# Patient Record
Sex: FEMALE | Race: BLACK OR AFRICAN AMERICAN | NOT HISPANIC OR LATINO | Employment: FULL TIME | URBAN - METROPOLITAN AREA
[De-identification: names, ages, dates, MRNs, and addresses within clinical notes are randomized per-mention and may not be internally consistent; named-entity substitution may affect disease eponyms.]

---

## 2017-01-09 ENCOUNTER — OFFICE VISIT (OUTPATIENT)
Dept: FAMILY MEDICINE | Facility: CLINIC | Age: 57
End: 2017-01-09

## 2017-01-09 VITALS
SYSTOLIC BLOOD PRESSURE: 128 MMHG | TEMPERATURE: 97.5 F | HEART RATE: 76 BPM | WEIGHT: 169 LBS | HEIGHT: 67 IN | DIASTOLIC BLOOD PRESSURE: 83 MMHG | BODY MASS INDEX: 26.53 KG/M2 | OXYGEN SATURATION: 98 % | RESPIRATION RATE: 16 BRPM

## 2017-01-09 DIAGNOSIS — K21.9 GASTROESOPHAGEAL REFLUX DISEASE WITHOUT ESOPHAGITIS: ICD-10-CM

## 2017-01-09 DIAGNOSIS — E03.9 ACQUIRED HYPOTHYROIDISM: ICD-10-CM

## 2017-01-09 DIAGNOSIS — J45.30 MILD PERSISTENT ASTHMA WITHOUT COMPLICATION: ICD-10-CM

## 2017-01-09 DIAGNOSIS — E78.5 HYPERLIPIDEMIA LDL GOAL <130: ICD-10-CM

## 2017-01-09 DIAGNOSIS — Z11.59 NEED FOR HEPATITIS C SCREENING TEST: ICD-10-CM

## 2017-01-09 DIAGNOSIS — Z00.00 ROUTINE GENERAL MEDICAL EXAMINATION AT A HEALTH CARE FACILITY: Primary | ICD-10-CM

## 2017-01-09 DIAGNOSIS — S49.91XA SHOULDER INJURY, RIGHT, INITIAL ENCOUNTER: ICD-10-CM

## 2017-01-09 DIAGNOSIS — R09.81 NASAL CONGESTION: ICD-10-CM

## 2017-01-09 DIAGNOSIS — J31.0 CHRONIC RHINITIS: ICD-10-CM

## 2017-01-09 DIAGNOSIS — J45.31 MILD PERSISTENT ASTHMA WITH ACUTE EXACERBATION: ICD-10-CM

## 2017-01-09 DIAGNOSIS — E03.4 HYPOTHYROIDISM DUE TO ACQUIRED ATROPHY OF THYROID: ICD-10-CM

## 2017-01-09 LAB
ALBUMIN SERPL-MCNC: 3.3 G/DL (ref 3.4–5)
ALP SERPL-CCNC: 71 U/L (ref 40–150)
ALT SERPL W P-5'-P-CCNC: 19 U/L (ref 0–50)
ANION GAP SERPL CALCULATED.3IONS-SCNC: 5 MMOL/L (ref 3–14)
AST SERPL W P-5'-P-CCNC: 18 U/L (ref 0–45)
BASOPHILS # BLD AUTO: 0 10E9/L (ref 0–0.2)
BASOPHILS NFR BLD AUTO: 0.9 %
BILIRUB SERPL-MCNC: 0.2 MG/DL (ref 0.2–1.3)
BUN SERPL-MCNC: 9 MG/DL (ref 7–30)
CALCIUM SERPL-MCNC: 8.8 MG/DL (ref 8.5–10.1)
CHLORIDE SERPL-SCNC: 107 MMOL/L (ref 94–109)
CHOLEST SERPL-MCNC: 221 MG/DL
CO2 SERPL-SCNC: 28 MMOL/L (ref 20–32)
CREAT SERPL-MCNC: 0.75 MG/DL (ref 0.52–1.04)
DIFFERENTIAL METHOD BLD: NORMAL
EOSINOPHIL # BLD AUTO: 0.4 10E9/L (ref 0–0.7)
EOSINOPHIL NFR BLD AUTO: 8.7 %
ERYTHROCYTE [DISTWIDTH] IN BLOOD BY AUTOMATED COUNT: 13.3 % (ref 10–15)
GFR SERPL CREATININE-BSD FRML MDRD: 79 ML/MIN/1.7M2
GLUCOSE SERPL-MCNC: 100 MG/DL (ref 70–99)
HCT VFR BLD AUTO: 42.2 % (ref 35–47)
HCV AB SERPL QL IA: NORMAL
HDLC SERPL-MCNC: 57 MG/DL
HGB BLD-MCNC: 13.9 G/DL (ref 11.7–15.7)
IMM GRANULOCYTES # BLD: 0 10E9/L (ref 0–0.4)
IMM GRANULOCYTES NFR BLD: 0.2 %
LDLC SERPL CALC-MCNC: 142 MG/DL
LYMPHOCYTES # BLD AUTO: 2.2 10E9/L (ref 0.8–5.3)
LYMPHOCYTES NFR BLD AUTO: 47.8 %
MCH RBC QN AUTO: 28.5 PG (ref 26.5–33)
MCHC RBC AUTO-ENTMCNC: 32.9 G/DL (ref 31.5–36.5)
MCV RBC AUTO: 87 FL (ref 78–100)
MONOCYTES # BLD AUTO: 0.3 10E9/L (ref 0–1.3)
MONOCYTES NFR BLD AUTO: 6.6 %
NEUTROPHILS # BLD AUTO: 1.6 10E9/L (ref 1.6–8.3)
NEUTROPHILS NFR BLD AUTO: 35.8 %
NONHDLC SERPL-MCNC: 164 MG/DL
NRBC # BLD AUTO: 0 10*3/UL
NRBC BLD AUTO-RTO: 0 /100
PLATELET # BLD AUTO: 236 10E9/L (ref 150–450)
POTASSIUM SERPL-SCNC: 4 MMOL/L (ref 3.4–5.3)
PROT SERPL-MCNC: 7.6 G/DL (ref 6.8–8.8)
RBC # BLD AUTO: 4.87 10E12/L (ref 3.8–5.2)
SODIUM SERPL-SCNC: 140 MMOL/L (ref 133–144)
TRIGL SERPL-MCNC: 109 MG/DL
TSH SERPL DL<=0.005 MIU/L-ACNC: 1.78 MU/L (ref 0.4–4)
WBC # BLD AUTO: 4.6 10E9/L (ref 4–11)

## 2017-01-09 RX ORDER — CYCLOBENZAPRINE HCL 10 MG
10 TABLET ORAL
Qty: 14 TABLET | Refills: 0 | Status: SHIPPED | OUTPATIENT
Start: 2017-01-09 | End: 2017-11-16

## 2017-01-09 RX ORDER — LEVOTHYROXINE SODIUM 75 UG/1
75 TABLET ORAL DAILY
Qty: 90 TABLET | Refills: 3 | Status: SHIPPED | OUTPATIENT
Start: 2017-01-09 | End: 2018-01-24

## 2017-01-09 RX ORDER — ALBUTEROL SULFATE 90 UG/1
2 AEROSOL, METERED RESPIRATORY (INHALATION) EVERY 4 HOURS PRN
Qty: 1 INHALER | Refills: 2 | Status: SHIPPED | OUTPATIENT
Start: 2017-01-09 | End: 2018-01-24

## 2017-01-09 RX ORDER — FLUTICASONE PROPIONATE 50 MCG
2 SPRAY, SUSPENSION (ML) NASAL DAILY
Qty: 16 G | Refills: 11 | Status: SHIPPED | OUTPATIENT
Start: 2017-01-09 | End: 2018-01-24

## 2017-01-09 ASSESSMENT — PAIN SCALES - GENERAL: PAINLEVEL: SEVERE PAIN (6)

## 2017-01-09 NOTE — PATIENT INSTRUCTIONS
[facility] Medication Refill Request Information:  * Please contact your pharmacy regarding ANY request for medication refills.  ** [facility] Prescription Fax = [phone]  * Please allow 3 business days for routine medication refills.  * Please allow 5 business days for controlled substance medication refills.     [facility] Test Result notification information:  *You will be notified with in 7-10 days of your appointment day regarding the results of your test.  If you are on MyChart you will be notified as soon as the provider has reviewed the results and signed off on them.    [facility] [phone]
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
## 2017-01-09 NOTE — MR AVS SNAPSHOT
After Visit Summary   1/9/2017    Carolina Baugh    MRN: [id number]           Patient Information     Date Of Birth          1960        Visit Information        Provider Department      1/9/2017 1:50 PM Teto Mesa MD [facility]        Today's Diagnoses     Routine general medical examination at a health care facility    -  1     Mild persistent asthma without complication         Acquired hypothyroidism         Need for hepatitis C screening test         Hyperlipidemia LDL goal <130         Hypothyroidism due to acquired atrophy of thyroid         Gastroesophageal reflux disease without esophagitis         Nasal congestion         Chronic rhinitis         Mild persistent asthma with acute exacerbation         Shoulder injury, right, initial encounter           Care Instructions    [facility] Center Medication Refill Request Information:  * Please contact your pharmacy regarding ANY request for medication refills.  ** [facility] Prescription Fax = [phone]  * Please allow 3 business days for routine medication refills.  * Please allow 5 business days for controlled substance medication refills.     [facility] Test Result notification information:  *You will be notified with in 7-10 days of your appointment day regarding the results of your test.  If you are on MyChart you will be notified as soon as the provider has reviewed the results and signed off on them.    Primary Care Center [phone]           Follow-ups after your visit        Additional Services     PHYSICAL THERAPY REFERRAL       *This therapy referral will be filtered to a centralized scheduling office at [facility] and the patient will receive a call to schedule an appointment at a [facility] location most convenient for them. *     [facility] provides Physical Therapy evaluation and treatment and many specialty services across the [place] system.  If  requesting a specialty program, please choose from the list below.    If you have not heard from the scheduling office within 2 business days, please call [phone] for all locations, with the exception of [place], please call [phone].  Treatment: Evaluation & Treatment  Special Instructions/Modalities: per therapist  Special Programs: per therapist    Please be aware that coverage of these services is subject to the terms and limitations of your health insurance plan.  Call member services at your health plan with any benefit or coverage questions.      **Note to Provider:  If you are referring outside of [facility] for the therapy appointment, please list the name of the location in the  special instructions  above, print the referral and give to the patient to schedule the appointment.                  Follow-up notes from your care team     Write patient with results Return in about 1 year (around 1/9/2018).      Your next 10 appointments already scheduled     Jan 09, 2017  3:15 PM   LAB with  LAB   [facility] ([facility])    [address] [address] [phone]           Patient must bring picture ID.  Patient should be prepared to give a urine specimen  Please do not eat 10-12 hours before your appointment if you are coming in fasting for labs on lipids, cholesterol, or glucose (sugar).  Pregnant women should follow their Care Team instructions. Water with medications is okay. Do not drink coffee or other fluids.   If you have concerns about taking  your medications, please ask at office or if scheduling via [employer], send a message by clicking on Secure Messaging, Message Your Care Team.            Feb 10, 2017  2:00 PM   New Visit with Isidro Goddard MD   [facility]    [id number] [place] [address] [phone]              Future tests that were ordered for you today      Open Future Orders        Priority Expected Expires Ordered    Comprehensive metabolic panel Routine 2017    Lipid panel reflex to direct LDL Routine 2017    CBC with platelets differential Routine 2017    TSH with free T4 reflex Routine 2017    Hepatitis C Screen Reflex to HCV RNA Quant and Genotype Routine 2017            Who to contact     Please call your clinic at [phone] to:    Ask questions about your health    Make or cancel appointments    Discuss your medicines    Learn about your test results    Speak to your doctor   If you have compliments or concerns about an experience at your clinic, or if you wish to file a complaint, please contact [facility] Patient Relations at [phone] or email us at [email or web address]         Additional Information About Your Visit        [email or web address] Information     [email or web address] is an electronic gateway that provides easy, online access to your medical records. With [email or web address], you can request a clinic appointment, read your test results, renew a prescription or communicate with your care team.     To sign up for [email or web address] visit the website at [email or web address]   You will be asked to enter the access code listed below, as well as some personal information. Please follow the directions to create your username and password.     Your access code is: [email or web address]  Expires: 2017  5:30 AM     Your access code will  in 90 days. If you need help or a new code, please contact your [facility] or call [phone] for assistance.        Care EveryWhere ID     This is your Care EveryWhere ID. This could be used by other organizations to access your [facility] medical records  [id number]        Your Vitals Were     Pulse Temperature Respirations    76 97.5  F (36.4  C) (Oral) 16    Height BMI  "(Body Mass Index) Pulse Oximetry    1.689 m (5' 6.5\") 26.87 kg/m2 98%    Last Period Breastfeeding?       01/13/2012 No        Blood Pressure from Last 3 Encounters:   01/09/17 128/83   11/29/16 128/88   01/14/16 119/77    Weight from Last 3 Encounters:   01/09/17 76.658 kg (169 lb)   11/29/16 77.474 kg (170 lb 12.8 oz)   01/14/16 75.297 kg (166 lb)              We Performed the Following     PHYSICAL THERAPY REFERRAL          Today's Medication Changes          These changes are accurate as of: 1/9/17  3:00 PM.  If you have any questions, ask your nurse or doctor.               Start taking these medicines.        Dose/Directions    cyclobenzaprine 10 MG tablet   Commonly known as:  FLEXERIL   Used for:  Shoulder injury, right, initial encounter   Started by:  Teto Mesa MD        Dose:  10 mg   Take 1 tablet (10 mg) by mouth once as needed for muscle spasms (at bedtime)   Quantity:  14 tablet   Refills:  0            Where to get your medicines      These medications were sent to [facility] [place], [place] - [address]  [address] [address]     Phone:  [phone]    - albuterol 108 (90 BASE) MCG/ACT Inhaler  - cyclobenzaprine 10 MG tablet  - fluticasone 50 MCG/ACT spray  - fluticasone-salmeterol 250-50 MCG/DOSE diskus inhaler  - levothyroxine 75 MCG tablet  - ranitidine 300 MG tablet             Primary Care Provider Office Phone # Fax #    Teto Mesa [phone]        [facility] [address] [place] [address] [address]  [facility] [address]        Thank you!     Thank you for choosing [facility]  for your care. Our goal is always to provide you with excellent care. Hearing back from our patients is one way we can continue to improve our services. Please take a few minutes to complete the written survey that you may receive in the mail after your visit with us. Thank you!             Your Updated Medication List - Protect " others around you: Learn how to safely use, store and throw away your medicines at www.disposemymeds.org.          This list is accurate as of: 1/9/17  3:00 PM.  Always use your most recent med list.                   Brand Name Dispense Instructions for use    AEROCHAMBER MV Misc      Use as directed.  .       albuterol 108 (90 BASE) MCG/ACT Inhaler    PROAIR HFA/PROVENTIL HFA/VENTOLIN HFA    1 Inhaler    Inhale 2 puffs into the lungs every 4 hours as needed for shortness of breath / dyspnea       benzonatate 200 MG capsule    TESSALON    20 capsule    Take 1 capsule (200 mg) by mouth 3 times daily as needed for cough       CALCIUM-VITAMIN D PO      Take 1 tablet by mouth daily.       cyclobenzaprine 10 MG tablet    FLEXERIL    14 tablet    Take 1 tablet (10 mg) by mouth once as needed for muscle spasms (at bedtime)       fexofenadine 180 MG tablet    ALLEGRA     1 tablet daily. for allergy symptoms.       FISH OIL PO      Take 1 tablet by mouth daily as needed.       fluticasone 50 MCG/ACT spray    FLONASE    16 g    Spray 2 sprays into both nostrils daily       fluticasone-salmeterol 250-50 MCG/DOSE diskus inhaler    ADVAIR    1 Inhaler    Inhale 1 puff into the lungs every 12 hours       guaiFENesin-codeine 100-10 MG/5ML Soln solution    ROBITUSSIN AC    180 mL    Take 5-10 mLs by mouth every 4 hours as needed for cough       levothyroxine 75 MCG tablet    SYNTHROID/LEVOTHROID    90 tablet    Take 1 tablet (75 mcg) by mouth daily       MULTIVITAMIN PO      Take 1 tablet by mouth daily.       ranitidine 300 MG tablet    ZANTAC    90 tablet    Take 1 tablet (300 mg) by mouth At Bedtime       REFRESH OP      Apply to eye as needed       SALINE FLUSH IJ      by Nasal Instillation route.

## 2017-01-09 NOTE — NURSING NOTE
Chief Complaint   Patient presents with     Physical     Patient is here for annual physical; no pap     Geraldo Marina CMA at 2:02 PM on 1/9/2017

## 2017-01-10 ASSESSMENT — ASTHMA QUESTIONNAIRES: ACT_TOTALSCORE: 24

## 2017-01-13 ENCOUNTER — THERAPY VISIT (OUTPATIENT)
Dept: PHYSICAL THERAPY | Facility: CLINIC | Age: 57
End: 2017-01-13
Payer: COMMERCIAL

## 2017-01-13 DIAGNOSIS — M54.2 CERVICAL PAIN: ICD-10-CM

## 2017-01-13 DIAGNOSIS — S49.91XA INJURY OF RIGHT SHOULDER: Primary | ICD-10-CM

## 2017-01-13 PROCEDURE — 97112 NEUROMUSCULAR REEDUCATION: CPT | Mod: GP | Performed by: PHYSICAL THERAPIST

## 2017-01-13 PROCEDURE — 97161 PT EVAL LOW COMPLEX 20 MIN: CPT | Mod: GP | Performed by: PHYSICAL THERAPIST

## 2017-01-13 PROCEDURE — 97110 THERAPEUTIC EXERCISES: CPT | Mod: GP | Performed by: PHYSICAL THERAPIST

## 2017-01-13 NOTE — PROGRESS NOTES
[facility] Initial Evaluation      Subjective:    Carolina Baugh is a 57 year old female with a right shoulder condition.  Condition occurred with:  A fall (FIRST STRUCK ON HER BUTTOCKS. ).  Condition occurred: at work.  This is a new condition  EARLY December 2016..    Patient reports pain:  Posterior.  Radiates to:  Cervical, lower arm and upper arm.  Pain is described as cramping and is intermittent and reported as 8/10.  Associated symptoms:  Tingling and loss of motion/stiffness. Pain is worse in the P.M..  Symptoms are exacerbated by lying on extremity and using arm behind back and relieved by heat.  Since onset symptoms are gradually improving.  Special testing: NONE.  Previous treatment: NONE.    General health as reported by patient is good.  Pertinent medical history includes:  Thyroid problems and asthma.  Medical allergies: no.  Other surgeries include:  Other.  Current medications:  Pain medication.  Current occupation is .    Primary job tasks include:  Driving and prolonged sitting.    Barriers include:  None as reported by the patient.    Red flags:  None as reported by the patient.                      Objective:    System                   Shoulder Evaluation:  ROM:  AROM:    Flexion:  Left:  WNL    Right:  WNL ERP                      Extension/Internal Rotation:  Left:  T8    Right:  T8          Strength:        Abduction:  Left: 5/5  Pain:    Right:/5  Strong/painful    Pain:  Adduction:  Right:/5  Strong/painful    Pain:  Internal Rotation:  Left:5/5     Pain:    Right:/5  Strong/painful    Pain:  External Rotation:   Left:5/5     Pain:   Right:/5  Strong/painful    Pain:                Special Tests:      Left shoulder negative for the following special tests:  Impingement  Right shoulder positive for the following special tests:Impingement                                       Shar Cervical Evaluation      Movement Loss:  Protrusion (PRO): pain  Flexion  (Flex): pain  Retraction (RET): pain  Extension (EXT): pain  Lateral Flexion Right (LF R): pain  Lateral Flexion Left (LF L): pain  Rotation Right (ROT R): pain  Rotation Left (ROT L): pain  Test Movements:      RET: During: increases    Repeat RET: After: no better and no worse    RET EXT: During: increases    Repeat RET EXT: During: centralizing                          Conclusion: derangement  Principle of Treatment:  Posture Correction: IN SITTING WITH TOWEL ROLL    Extension: RETRACTION- RETRACTION EXTENSION.     Other: NEUTRAL SPINE, THER EX, THER ACT, NMR, MANUAL THERAPY.                                          ROS    Assessment/Plan:      Patient is a 57 year old female with cervical and right side shoulder complaints.    Patient has the following significant findings with corresponding treatment plan.                Diagnosis 1:  RIGHT CERVICAL / SHOULDER PAIN.   Pain -  hot/cold therapy, US, electric stimulation, mechanical traction, manual therapy, splint/taping/bracing/orthotics, self management, education, directional preference exercise and home program  Decreased ROM/flexibility - manual therapy, therapeutic exercise, therapeutic activity and home program  Decreased function - therapeutic activities and home program  Impaired posture - neuro re-education, therapeutic activities and home program    Therapy Evaluation Codes:   1) History comprised of:   Personal factors that impact the plan of care:      None.    Comorbidity factors that impact the plan of care are:      None.     Medications impacting care: None.  2) Examination of Body Systems comprised of:   Body structures and functions that impact the plan of care:      Cervical spine.   Activity limitations that impact the plan of care are:      Driving.  3) Clinical presentation characteristics are:   Stable/Uncomplicated.  4) Decision-Making    Low complexity using standardized patient assessment instrument and/or measureable assessment of  functional outcome.  Cumulative Therapy Evaluation is: Low complexity.    Previous and current functional limitations:  (See Goal Flow Sheet for this information)    Short term and Long term goals: (See Goal Flow Sheet for this information)     Communication ability:  Patient appears to be able to clearly communicate and understand verbal and written communication and follow directions correctly.  Treatment Explanation - The following has been discussed with the patient:   RX ordered/plan of care  Anticipated outcomes  Possible risks and side effects  This patient would benefit from PT intervention to resume normal activities.   Rehab potential is good.    Frequency:  1 X week, once daily  Duration:  for 1 weeks  Discharge Plan:  Achieve all LTG.  Independent in home treatment program.  Reach maximal therapeutic benefit.    Please refer to the daily flowsheet for treatment today, total treatment time and time spent performing 1:1 timed codes.

## 2017-01-16 ENCOUNTER — THERAPY VISIT (OUTPATIENT)
Dept: PHYSICAL THERAPY | Facility: CLINIC | Age: 57
End: 2017-01-16
Payer: COMMERCIAL

## 2017-01-16 DIAGNOSIS — M54.2 CERVICAL PAIN: ICD-10-CM

## 2017-01-16 DIAGNOSIS — S49.91XD INJURY OF RIGHT SHOULDER, SUBSEQUENT ENCOUNTER: Primary | ICD-10-CM

## 2017-01-16 PROCEDURE — 97035 APP MDLTY 1+ULTRASOUND EA 15: CPT | Mod: GP

## 2017-01-16 PROCEDURE — 97110 THERAPEUTIC EXERCISES: CPT | Mod: GP

## 2017-01-16 PROCEDURE — 97112 NEUROMUSCULAR REEDUCATION: CPT | Mod: GP

## 2017-01-16 PROCEDURE — 97140 MANUAL THERAPY 1/> REGIONS: CPT | Mod: GP

## 2017-01-16 PROCEDURE — 97530 THERAPEUTIC ACTIVITIES: CPT | Mod: GP

## 2017-01-31 ENCOUNTER — TELEPHONE (OUTPATIENT)
Dept: DERMATOLOGY | Facility: CLINIC | Age: 57
End: 2017-01-31

## 2017-01-31 NOTE — TELEPHONE ENCOUNTER
Left message for patient regarding upcoming appointment on 2/10/17 at 2:00pm.  Informed patient to bring an updated list of allergies, medications, pharmacy details and insurance information. Asked patient to bring any dermatology records from outside [place] or the [facility] or have them faxed to [phone].    Patient Reminders Given:  --Plan on being in our facility for approximately one hour, this includes the registration process, office visit, education and check-out process.  If you are having a procedure, more time may be required.     --If you are having a procedure, please, present 15 minutes early.  --We are located on the second floor of the building, check in desk 4.   --If you need to cancel or reschedule, call [phone].  --We look forward to seeing you in Dermatology Clinic.       Akilah Russell Medical Assistant

## 2017-02-09 ENCOUNTER — TELEPHONE (OUTPATIENT)
Dept: DERMATOLOGY | Facility: CLINIC | Age: 57
End: 2017-02-09

## 2017-02-09 NOTE — TELEPHONE ENCOUNTER
[facility] Call Center    Phone Message    Name of Caller: David    Phone Number: Other phone number:  [phone]*    Best time to return call: ANY    May a detailed message be left on voicemail: yes    Relation to patient: daughter of patient, patient doesn't speak english    Reason for Call: Other: Patient needs new patient forms, (Auth to discuss, Release of Info, faxed to her so she can fill out before her appointment tomorrow. fax is [phone]. please call back daughter on cell [phone] once complete or if there are any issues    Action Taken: Message routed to:  Adult Clinics: Dermatology p [id number]

## 2017-02-09 NOTE — TELEPHONE ENCOUNTER
Forms faxed to daughter.  Call placed to daughter to confirm that she did receive.      Carol Westholter

## 2017-02-10 ENCOUNTER — OFFICE VISIT (OUTPATIENT)
Dept: DERMATOLOGY | Facility: CLINIC | Age: 57
End: 2017-02-10
Payer: COMMERCIAL

## 2017-02-10 DIAGNOSIS — L81.0 POST-INFLAMMATORY HYPERPIGMENTATION: Primary | ICD-10-CM

## 2017-02-10 DIAGNOSIS — L82.1 DERMATOSIS PAPULOSA NIGRA: ICD-10-CM

## 2017-02-10 DIAGNOSIS — L81.1 MELASMA: ICD-10-CM

## 2017-02-10 PROCEDURE — 99201 ZZC OFFICE/OUTPT VISIT, NEW, LEVEL I: CPT | Performed by: DERMATOLOGY

## 2017-02-10 RX ORDER — HYDROQUINONE 40 MG/G
CREAM TOPICAL
Qty: 60 G | Refills: 3 | Status: SHIPPED | OUTPATIENT
Start: 2017-02-10 | End: 2018-10-30

## 2017-02-10 NOTE — NURSING NOTE
Dermatology Rooming Note    Carolina Baugh's goals for this visit include:   Chief Complaint   Patient presents with     Derm Problem     Dark colored spots on face       Is a scribe okay for this visit:YES    Are records needed for this visit(If yes, obtain release of information): no     Vitals: LMP 01/13/2012    Referring Provider:  Referred Self, MD  No address on file

## 2017-02-10 NOTE — MR AVS SNAPSHOT
After Visit Summary   2/10/2017    Carolina Baugh    MRN: [id number]           Patient Information     Date Of Birth          1960        Visit Information        Provider Department      2/10/2017 2:00 PM Isidro Goddard MD [facility]        Today's Diagnoses     Post-inflammatory hyperpigmentation    -  1     Melasma         Dermatosis papulosa nigra           Care Instructions    1) Bleaching Cream: Hydroquinone is the ingredient for the bleaching cream. If you use this cream twice a day for over 4 months consistently you will get actually pigment deposition. There is an over the counter version at 2%. AMBI cream is the over the counter version.  -[facility] pharmacy is an online pharmacy that will likely have this medication at a cheaper price.        2) Differin Gel 0.1%. This is over the counter. This will make you dry especially you use it every night. You need time for the skin to adjust to it. It is only used at night. This medicine is best found online on amazon.com. Apply a pea-sized amount to the face at night. Start every 3rd night and increase by 1 night ever week as tolerated.  STOP THIS MEDICINE 1 week before any chemical peel and restart 1 week after.      3) Chemical peels will slowly help peel the pigment away.      4) You will need to protect yourself from the sun. This is very important. SUn exposure will make everything work. USe a sunscreen.              Follow-ups after your visit        Your next 10 appointments already scheduled     Feb 15, 2017  2:00 PM   Nurse Only with NURSE ONLY MG DERM   [facility])    [address] [facility] [address] [phone]            May 11, 2017  2:45 PM   Return Visit with Isidro Goddard MD   [facility])    [address] [facility] [address] [phone]              Who to contact     If  you have questions or need follow up information about today's clinic visit or your schedule please contact [facility] directly at [phone].  Normal or non-critical lab and imaging results will be communicated to you by MyChart, letter or phone within 4 business days after the clinic has received the results. If you do not hear from us within 7 days, please contact the clinic through MyChart or phone. If you have a critical or abnormal lab result, we will notify you by phone as soon as possible.  Submit refill requests through [employer] or call your pharmacy and they will forward the refill request to us. Please allow 3 business days for your refill to be completed.          Additional Information About Your Visit        [employer]ia Information     [employer] is an electronic gateway that provides easy, online access to your medical records. With [employer]ognia, you can request a clinic appointment, read your test results, renew a prescription or communicate with your care team.     To sign up for [employer]ia visit the website at [email or web address]   You will be asked to enter the access code listed below, as well as some personal information. Please follow the directions to create your username and password.     Your access code is: [email or web address]  Expires: 2017  2:57 PM     Your access code will  in 90 days. If you need help or a new code, please contact your [facility] or call [phone] for assistance.        Care EveryWhere ID     This is your Care EveryWhere ID. This could be used by other organizations to access your [facility] medical records  [id number]        Your Vitals Were     Last Period                   2012            Blood Pressure from Last 3 Encounters:   17 128/83   16 128/88   16 119/77    Weight from Last 3 Encounters:   17 76.658 kg (169 lb)   16 77.474 kg (170 lb 12.8 oz)   16 75.297 kg (166 lb)               Today, you had the following     No orders found for display         Today's Medication Changes          These changes are accurate as of: 2/10/17  2:57 PM.  If you have any questions, ask your nurse or doctor.               Start taking these medicines.        Dose/Directions    hydroquinone 4 % Crea   Used for:  Melasma, Post-inflammatory hyperpigmentation   Started by:  Isidro Goddard MD        Apply twice a day to affected area of the face for 4 months max and then take a 1 month break.   Quantity:  60 g   Refills:  3            Where to get your medicines      These medications were sent to [facility] - [place], [place] - [address]  [address], [facility] [address]     Phone:  [phone]    - hydroquinone 4 % Crea             Primary Care Provider Office Phone # Fax #    Teto Mesa [phone]        [facility] [address] [facility] [address]  [facility] [address]        Thank you!     Thank you for choosing [facility]  for your care. Our goal is always to provide you with excellent care. Hearing back from our patients is one way we can continue to improve our services. Please take a few minutes to complete the written survey that you may receive in the mail after your visit with us. Thank you!             Your Updated Medication List - Protect others around you: Learn how to safely use, store and throw away your medicines at www.disposemymeds.org.          This list is accurate as of: 2/10/17  2:57 PM.  Always use your most recent med list.                   Brand Name Dispense Instructions for use    AEROCHAMBER MV Misc      Use as directed.  .       albuterol 108 (90 BASE) MCG/ACT Inhaler    PROAIR HFA/PROVENTIL HFA/VENTOLIN HFA    1 Inhaler    Inhale 2 puffs into the lungs every 4 hours as needed for shortness of breath / dyspnea       cyclobenzaprine 10 MG tablet    FLEXERIL    14 tablet    Take 1 tablet (10 mg) by  mouth once as needed for muscle spasms (at bedtime)       fexofenadine 180 MG tablet    ALLEGRA     1 tablet daily. for allergy symptoms.       FISH OIL PO      Take 1 tablet by mouth daily as needed.       fluticasone 50 MCG/ACT spray    FLONASE    16 g    Spray 2 sprays into both nostrils daily       fluticasone-salmeterol 250-50 MCG/DOSE diskus inhaler    ADVAIR    1 Inhaler    Inhale 1 puff into the lungs every 12 hours       hydroquinone 4 % Crea     60 g    Apply twice a day to affected area of the face for 4 months max and then take a 1 month break.       levothyroxine 75 MCG tablet    SYNTHROID/LEVOTHROID    90 tablet    Take 1 tablet (75 mcg) by mouth daily       MULTIVITAMIN PO      Take 1 tablet by mouth daily.       ranitidine 300 MG tablet    ZANTAC    90 tablet    Take 1 tablet (300 mg) by mouth At Bedtime       REFRESH OP      Apply to eye as needed

## 2017-02-10 NOTE — PATIENT INSTRUCTIONS
1) Bleaching Cream: Hydroquinone is the ingredient for the bleaching cream. If you use this cream twice a day for over 4 months consistently you will get actually pigment deposition. There is an over the counter version at 2%. AMBI cream is the over the counter version.  -[facility] pharmacy is an online pharmacy that will likely have this medication at a cheaper price.        2) Differin Gel 0.1%. This is over the counter. This will make you dry especially you use it every night. You need time for the skin to adjust to it. It is only used at night. This medicine is best found online on amazon.com. Apply a pea-sized amount to the face at night. Start every 3rd night and increase by 1 night ever week as tolerated.  STOP THIS MEDICINE 1 week before any chemical peel and restart 1 week after.      3) Chemical peels will slowly help peel the pigment away.      4) You will need to protect yourself from the sun. This is very important. SUn exposure will make everything work. USe a sunscreen.

## 2017-02-10 NOTE — PROGRESS NOTES
"[facility] Dermatology Note      Dermatology Problem List:  1. Melasma: Zygoma  - Strict Sun protection  - 4% Hydroquinone BID x 4 months then 1 month break  - Differin 0.1% gel qhs taper up  - Chemical peels  2. Post-inflammatory hyperpigmentation from Capsaicin: Right side of face.  - Strict Sun protection  - 4% Hydroquinone BID x 4 months then 1 month break  - Differin 0.1% gel qhs taper up  - Chemical peels  3. Dermatosis Papillomatosis nigra    Encounter Date: Feb 10, 2017    CC:  Chief Complaint   Patient presents with     Derm Problem     Dark colored spots on face         History of Present Illness:  Ms. Carolina Baugh is a 57 year old female who presents for evaluation of facial discoloration. She is here with her daughter She has a history of facial discoloration while she was pregnant and that discoloration is on her cheekbones. That has been relatively stable. She saw a dr regarding black bumps on her cheeks and they treated with cryotherapy on a few which left \"scars.\" On a night 2 months ago she was applying capsaicin cream to her 's back, washed her hands twice, and then slept with her right hand on her cheek as she usually does but woke up with blisters on the entire side of the cheek and lesser so on the left cheek. The blistering and irritations now have all resolved but it left a hyperpigmented stain on those locations.        Past Medical History:   Patient Active Problem List   Diagnosis     Mild persistent asthma     Encounter for screening colonoscopy     CARDIOVASCULAR SCREENING; LDL GOAL LESS THAN 160     Sinusitis, chronic     Post-menopausal     Hypothyroidism     Mantoux: positive     Advance care planning     Injury of right shoulder     Cervical pain     Past Medical History   Diagnosis Date     Asthma      Encounter for screening colonoscopy 11/11/11     Normal, next 10 years 2021     MGD (meibomian gland disease)      Dry eye syndrome      Hyperthyroidism  "     Past Surgical History   Procedure Laterality Date     Ent surgery       Biopsy breast Right        Social History:  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 The patient denies use of tanning beds.    Family History:  There is no family history of skin cancer.    Medications:  Current Outpatient Prescriptions   Medication Sig Dispense Refill     levothyroxine (SYNTHROID/LEVOTHROID) 75 MCG tablet Take 1 tablet (75 mcg) by mouth daily 90 tablet 3     ranitidine (ZANTAC) 300 MG tablet Take 1 tablet (300 mg) by mouth At Bedtime 90 tablet 3     fluticasone (FLONASE) 50 MCG/ACT spray Spray 2 sprays into both nostrils daily 16 g 11     fluticasone-salmeterol (ADVAIR) 250-50 MCG/DOSE diskus inhaler Inhale 1 puff into the lungs every 12 hours 1 Inhaler 12     albuterol (PROAIR HFA/PROVENTIL HFA/VENTOLIN HFA) 108 (90 BASE) MCG/ACT Inhaler Inhale 2 puffs into the lungs every 4 hours as needed for shortness of breath / dyspnea 1 Inhaler 2     cyclobenzaprine (FLEXERIL) 10 MG tablet Take 1 tablet (10 mg) by mouth once as needed for muscle spasms (at bedtime) 14 tablet 0     Polyvinyl Alcohol-Povidone (REFRESH OP) Apply to eye as needed       Omega-3 Fatty Acids (FISH OIL PO) Take 1 tablet by mouth daily as needed.       Spacer/Aero-Holding Chambers (AEROCHAMBER MV) MISC Use as directed.  .        fexofenadine (ALLEGRA) 180 MG tablet 1 tablet daily. for allergy symptoms.       MULTIVITAMIN OR Take 1 tablet by mouth daily.         No Known Allergies    Review of Systems:  -Skin/Heme New Pt: The patient denies frequent sun exposure. The patient denies excessive scarring or problems healing except as per HPI. The patient denies excessive bleeding.  -Constitutional: The patient denies fatigue, fevers, chills, unintended weight loss, and night sweats.  -HEENT: Patient denies nonhealing oral sores.      Physical exam:  Vitals: [facility] 01/13/2012  GEN: This is a well developed, well-nourished female in no acute distress, in a pleasant mood.     PSYCH: In pleasant mood, appropriate affect  SKIN: Focused examination of the face was performed.  -Skin type 5  -Right lateral face there is hyperpigmented patches at site of prior blistering.   -B/l zygoma with reticulated darker brown patches that avoids the orbit and eyelid.   -Within the entire area there are a few waxy scattered papules 1-2 mm in diameter  -Left neck towards the jaw is a hyperpigmented burn scar.  -No other lesions of concern on areas examined.       Impression/Plan:  The brown reticulated patches on zygoma represents melasma most likely. The right sided hyperpigmentation is post-inflammatory hyperpigmentation. Unfortunately, treatment is cosmetic and not covered by insurance, which was explained to the patient. Melasma and Post-inflammatory hyperpigmentation is treated the same exact way.    1. Post-inflammatory hyperpigmentation + Melasma    Strict Sun protection    4% Hydroquinone BID x 4 months then 1 month break    OTC version available at 2%.    Warned of Exogenous Ochronosis    Provided info for [facility] pharmacy online and procedure for use.    OTC Differin 0.1% gel qhs taper up (order online)    Chemical peels, at least 3 is likely needed, consent done today.     No h/o cold sores.    Clinical photos obtained.    2. Dermatosis Papillomatosis nigra    Benign nature was discussed. No further intervention required at this time.     CC Teto Mesa MD on close of this encounter.  Follow-up 1-2 month after chemical peels completed.      Staff Involved:  Scribe/Staff    Scribe Disclosure:   I, Tony Nichols, am serving as a scribe to document services personally performed by Dr. Isidro Goddard, based on data collection and the provider's statements to me.     Provider Disclosure:   I have reviewed the documentation recorded by the scribe and have edited it as needed. I have personally performed the services documented here and the documentation accurately represents those  services and the decisions made by me.     Isidro Goddard MD, MS    Department of Dermatology  [facility]: Phone: [phone], Fax:[phone]  [facility]: Phone: [phone], Fax: [phone]

## 2017-02-15 ENCOUNTER — ALLIED HEALTH/NURSE VISIT (OUTPATIENT)
Dept: NURSING | Facility: CLINIC | Age: 57
End: 2017-02-15

## 2017-02-15 DIAGNOSIS — L81.0 POST-INFLAMMATORY HYPERPIGMENTATION: Primary | ICD-10-CM

## 2017-02-15 PROCEDURE — 96999 UNLISTED SPEC DERM SVC/PX: CPT

## 2017-02-15 NOTE — NURSING NOTE
Nursing Peel Treatment Record:  Feb 15, 2017    Pre peel:    Any changes in health or medications: No    Strawberry or aspirin allergy(If yes, then no salicylic acid peels to be given and procedure to be held): N/A    Sulfa allergy(If yes, then [employer] sensitive skin peel procedure to be held): N/A    Pregnant or breastfeeding(If yes, peel procedure to be held): No    Baseline photo obtained(3 views): Yes    Areas treated today: face    Treatment #: 1.    Confirmed that retinoid was stopped 7 days prior to peel: Yes    Peel Type: Sensitive Skin Peel    Confirmed that patient has not had electrolysis, depilatory creams, waxing or laser hair removal in the last week: Yes    Peel record:    Eye shields were placed.     Area to be treated was cleansed with Gentle Cleanser using soft 4X4 gauze pads.    Area was then toned with with the Conditioning Solution using soft 4X4 gauze pads.    Then, the areas were degreased with acetone and retexturing Activator applied. Clarifying Misha Masque then applied to face and let set for 6 minutes.     Time-out was performed to evaluate for any sensitive skin.      Hydrabalm was then applied to the lips, perinasal region, around nose ring and near the outer canthi.     The peel was applied with soft 2X2 for 1 pass and left on for 2 minutes, then removed with water dampened 4X4 soft gauze.     Phytocorrective gel and epidermal repair topicals were applied and followed by Sunscreen (Sheer Physical UV defence SPF 50).       Post peel:    Patient reminded of need to wait to use medicated creams until the skin has healed. This occurs five to seven days later. Post peel care instructions provided including need for sun avoidance. Patient tolerated peel well, no complications noted.       Payment:  The patient paid for a package of three peels today.     Madina Lerner LPN

## 2017-02-15 NOTE — NURSING NOTE
Carolina Baugh comes into clinic today at the request of Dr. Goddard found for Sensitive Peel to face.      This service provided today was under the direct supervision of Dr. Goddard, who was available if needed.    Eduarda Alonzo LPN

## 2017-02-15 NOTE — MR AVS SNAPSHOT
After Visit Summary   2/15/2017    Carolina Baugh    MRN: [id number]           Patient Information     Date Of Birth          1960        Visit Information        Provider Department      2/15/2017 2:00 PM NURSE ONLY [facility]        Care Instructions    Skin Peel Post Care Instructions    I will experience redness, swelling, peeling, pain, and heat sensation which can last 5-10days and may persist for 2-3weeks. I may experience itching, or acne. Risks are permanent scarring, temporary or permanent skin lightening or darkening, infection, and eye injury. I understand my outcome could be no improvement or slight improvement. Multiple treatments may be required.     What can I expect?    Skin peeling for three to five days    Flaking    Skin darkening    Redness    How do I take care of my skin?    Patient compliance is mandatory for an optimal outcome and to avoid complications    Wear sunscreen (SPF 30 or greater) and a hat. Stay out of the sun for three to four weeks     Use a gentle skin care regimen with a sunscreen with at least an SPF of 30 or more. Examples include the following:   o SkinCeuticals Gentle Cleanser and SkinCeuticals Physical Matte UV DEFENSE SPF 50   o Cetaphil Soap followed by Cetaphil Sunscreen in the am and pm    Put on a bland moisturizer (Aquaphor or Vaseline) if sunscreen stings    Do not pick at peel or peel the skin. This will cause scarring.    Wait to use medicated creams until the skin has healed. This occurs five to seven days later    You can use make-up after 24 hours. Make sure make-up does NOT contain salicylic acid.     Eye make-up and lipstick are okay immediately after procedure    Do not use facial scrubs, depilatories, steam, any exfoliation procedures, etc. on your face (or treated area of skin) for one week post-peel    When should I call the doctor?    Cold sores    Swelling    Crusting    Who should I call with  questions?    [facility]: [phone]     [facility]: [phone]    For urgent needs outside of business hours call the [facility] at [phone] and ask for the dermatology resident on call            Follow-ups after your visit        Your next 10 appointments already scheduled     Mar 15, 2017  2:30 PM CDT   Nurse Only with NURSE ONLY MG DERM   [facility]    [address] [facility] [address] [phone]            May 11, 2017  2:45 PM CDT   Return Visit with Isidro Goddard MD   [facility])    [address] [address] [address] [phone]              Who to contact     If you have questions or need follow up information about today's clinic visit or your schedule please contact [facility] directly at [phone].  Normal or non-critical lab and imaging results will be communicated to you by MyChart, letter or phone within 4 business days after the clinic has received the results. If you do not hear from us within 7 days, please contact the clinic through MyChart or phone. If you have a critical or abnormal lab result, we will notify you by phone as soon as possible.  Submit refill requests through [employer] or call your pharmacy and they will forward the refill request to us. Please allow 3 business days for your refill to be completed.          Additional Information About Your Visit        [employer] Information     [employer] is an electronic gateway that provides easy, online access to your medical records. With [employer], you can request a clinic appointment, read your test results, renew a prescription or communicate with your care team.     To sign up for [employer] visit the website at [email or web address]   You will be asked to enter the access code listed below, as well as some  personal information. Please follow the directions to create your username and password.     Your access code is: [email or web address]  Expires: 2017  2:57 PM     Your access code will  in 90 days. If you need help or a new code, please contact your [facility] or call [phone] for assistance.        Care EveryWhere ID     This is your Care EveryWhere ID. This could be used by other organizations to access your [place] medical records  [id number]        Your Vitals Were     Last Period                   2012            Blood Pressure from Last 3 Encounters:   17 128/83   16 128/88   16 119/77    Weight from Last 3 Encounters:   17 76.7 kg (169 lb)   16 77.5 kg (170 lb 12.8 oz)   16 75.3 kg (166 lb)              Today, you had the following     No orders found for display       Primary Care Provider Office Phone # Fax #    [facility] [phone]        [facility] [address] [place] [address] 7[address]  [facility] [address]        Thank you!     Thank you for choosing [facility]  for your care. Our goal is always to provide you with excellent care. Hearing back from our patients is one way we can continue to improve our services. Please take a few minutes to complete the written survey that you may receive in the mail after your visit with us. Thank you!             Your Updated Medication List - Protect others around you: Learn how to safely use, store and throw away your medicines at www.disposemymeds.org.          This list is accurate as of: 2/15/17  2:59 PM.  Always use your most recent med list.                   Brand Name Dispense Instructions for use    AEROCHAMBER MV Misc      Use as directed.  .       albuterol 108 (90 BASE) MCG/ACT Inhaler    PROAIR HFA/PROVENTIL HFA/VENTOLIN HFA    1 Inhaler    Inhale 2 puffs into the lungs every 4 hours as needed for shortness of breath / dyspnea        cyclobenzaprine 10 MG tablet    FLEXERIL    14 tablet    Take 1 tablet (10 mg) by mouth once as needed for muscle spasms (at bedtime)       fexofenadine 180 MG tablet    ALLEGRA     1 tablet daily. for allergy symptoms.       FISH OIL PO      Take 1 tablet by mouth daily as needed.       fluticasone 50 MCG/ACT spray    FLONASE    16 g    Spray 2 sprays into both nostrils daily       fluticasone-salmeterol 250-50 MCG/DOSE diskus inhaler    ADVAIR    1 Inhaler    Inhale 1 puff into the lungs every 12 hours       hydroquinone 4 % Crea     60 g    Apply twice a day to affected area of the face for 4 months max and then take a 1 month break.       levothyroxine 75 MCG tablet    SYNTHROID/LEVOTHROID    90 tablet    Take 1 tablet (75 mcg) by mouth daily       MULTIVITAMIN PO      Take 1 tablet by mouth daily.       ranitidine 300 MG tablet    ZANTAC    90 tablet    Take 1 tablet (300 mg) by mouth At Bedtime       REFRESH OP      Apply to eye as needed

## 2017-02-15 NOTE — PATIENT INSTRUCTIONS
Skin Peel Post Care Instructions    I will experience redness, swelling, peeling, pain, and heat sensation which can last 5-10days and may persist for 2-3weeks. I may experience itching, or acne. Risks are permanent scarring, temporary or permanent skin lightening or darkening, infection, and eye injury. I understand my outcome could be no improvement or slight improvement. Multiple treatments may be required.     What can I expect?    Skin peeling for three to five days    Flaking    Skin darkening    Redness    How do I take care of my skin?    Patient compliance is mandatory for an optimal outcome and to avoid complications    Wear sunscreen (SPF 30 or greater) and a hat. Stay out of the sun for three to four weeks     Use a gentle skin care regimen with a sunscreen with at least an SPF of 30 or more. Examples include the following:   o SkinCeuticals Gentle Cleanser and SkinCeuticals Physical Matte UV DEFENSE SPF 50   o Cetaphil Soap followed by Cetaphil Sunscreen in the am and pm    Put on a bland moisturizer (Aquaphor or Vaseline) if sunscreen stings    Do not pick at peel or peel the skin. This will cause scarring.    Wait to use medicated creams until the skin has healed. This occurs five to seven days later    You can use make-up after 24 hours. Make sure make-up does NOT contain salicylic acid.     Eye make-up and lipstick are okay immediately after procedure    Do not use facial scrubs, depilatories, steam, any exfoliation procedures, etc. on your face (or treated area of skin) for one week post-peel    When should I call the doctor?    Cold sores    Swelling    Crusting    Who should I call with questions?    [facility]: [phone]     [facility]: [phone]    For urgent needs outside of business hours call the [facility] at [phone] and ask for the dermatology resident on call

## 2017-03-15 ENCOUNTER — ALLIED HEALTH/NURSE VISIT (OUTPATIENT)
Dept: NURSING | Facility: CLINIC | Age: 57
End: 2017-03-15

## 2017-03-15 DIAGNOSIS — L81.0 POSTINFLAMMATORY HYPERPIGMENTATION: Primary | ICD-10-CM

## 2017-03-15 PROCEDURE — 96999 UNLISTED SPEC DERM SVC/PX: CPT

## 2017-03-15 NOTE — NURSING NOTE
Nursing Peel Treatment Record:  Mar 15, 2017    Pre peel:    Any changes in health or medications: No    Strawberry or aspirin allergy(If yes, then no salicylic acid peels to be given and procedure to be held): No    Sulfa allergy(If yes, then SkinCeuticals sensitive skin peel procedure to be held): N/A    Pregnant or breastfeeding(If yes, peel procedure to be held): No    Baseline photo obtained(3 views): Yes    Areas treated today: face    Treatment #: 2 of 6.    Confirmed that retinoid was stopped 7 days prior to peel: Yes    Peel Type: Micropeel plus(20% salicylic acid with 3% glycolic acid)    Confirmed that patient has not had electrolysis, depilatory creams, waxing or laser hair removal in the last week: Yes    Peel record:    Eye shields were placed.     Area to be treated was cleansed with Simply Clean Cleanser using rough 4X4 gauze pads.    Area was then toned with with the Conditioning Solution using rough 4X4 gauze pads.    Then, the areas were degreased with acetone. Time-out was performed to evaluate for any sensitive skin.      Hydrabalm was then applied to the lips, perinasal region and near the outer canthi.     The peel was applied with infante swabs for 3 passes, then removed with water dampened 4X4 soft gauze.     Phytocorrective gel and epidermal repair topicals were applied and followed by Sunscreen (Sheer Physical UV defence SPF 50).         Post peel:    Patient reminded of need to wait to use medicated creams until the skin has healed. This occurs five to seven days later. Post peel care instructions provided including need for sun avoidance. Patient tolerated peel well, no complications noted.       Payment:  The patient paid for a package of 3 peels on 2-15-17. This is peel 2 of 3.     Madina Lerner LPN

## 2017-03-15 NOTE — PATIENT INSTRUCTIONS
Skin Peel Post Care Instructions    I will experience redness, swelling, peeling, pain, and heat sensation which can last 5-10days and may persist for 2-3weeks. I may experience itching, or acne. Risks are permanent scarring, temporary or permanent skin lightening or darkening, infection, and eye injury. I understand my outcome could be no improvement or slight improvement. Multiple treatments may be required.     What can I expect?    Skin peeling for three to five days    Flaking    Skin darkening    Redness    How do I take care of my skin?    Patient compliance is mandatory for an optimal outcome and to avoid complications    Wear sunscreen (SPF 30 or greater) and a hat. Stay out of the sun for three to four weeks     Use a gentle skin care regimen with a sunscreen with at least an SPF of 30 or more. Examples include the following:   o SkinCeuticals Gentle Cleanser and SkinCeuticals Physical Matte UV DEFENSE SPF 50   o Cetaphil Soap followed by Cetaphil Sunscreen in the am and pm    Put on a bland moisturizer (Aquaphor or Vaseline) if sunscreen stings    Do not pick at peel or peel the skin. This will cause scarring.    Wait to use medicated creams until the skin has healed. This occurs five to seven days later    You can use make-up after 24 hours. Make sure make-up does NOT contain salicylic acid.     Eye make-up and lipstick are okay immediately after procedure    Do not use facial scrubs, depilatories, steam, any exfoliation procedures, etc. on your face (or treated area of skin) for one week post-peel    When should I call the doctor?    Cold sores    Swelling    Crusting    Who should I call with questions?    [facility]: [phone]     [facility]: [phone]    For urgent needs outside of business hours call the [facility] at [phone] and ask for the dermatology resident on call

## 2017-03-15 NOTE — MR AVS SNAPSHOT
After Visit Summary   3/15/2017    Carolina Baugh    MRN: [id number]           Patient Information     Date Of Birth          1960        Visit Information        Provider Department      3/15/2017 2:30 PM NURSE ONLY [facility]        Care Instructions    Skin Peel Post Care Instructions    I will experience redness, swelling, peeling, pain, and heat sensation which can last 5-10days and may persist for 2-3weeks. I may experience itching, or acne. Risks are permanent scarring, temporary or permanent skin lightening or darkening, infection, and eye injury. I understand my outcome could be no improvement or slight improvement. Multiple treatments may be required.     What can I expect?    Skin peeling for three to five days    Flaking    Skin darkening    Redness    How do I take care of my skin?    Patient compliance is mandatory for an optimal outcome and to avoid complications    Wear sunscreen (SPF 30 or greater) and a hat. Stay out of the sun for three to four weeks     Use a gentle skin care regimen with a sunscreen with at least an SPF of 30 or more. Examples include the following:   o SkinCeuticals Gentle Cleanser and SkinCeuticals Physical Matte UV DEFENSE SPF 50   o Cetaphil Soap followed by Cetaphil Sunscreen in the am and pm    Put on a bland moisturizer (Aquaphor or Vaseline) if sunscreen stings    Do not pick at peel or peel the skin. This will cause scarring.    Wait to use medicated creams until the skin has healed. This occurs five to seven days later    You can use make-up after 24 hours. Make sure make-up does NOT contain salicylic acid.     Eye make-up and lipstick are okay immediately after procedure    Do not use facial scrubs, depilatories, steam, any exfoliation procedures, etc. on your face (or treated area of skin) for one week post-peel    When should I call the doctor?    Cold sores    Swelling    Crusting    Who should I call with  questions?    [facility]: [phone]     [facility]: [phone]    For urgent needs outside of business hours call the [facility] at [phone] and ask for the dermatology resident on call          Follow-ups after your visit        Your next 10 appointments already scheduled     Apr 12, 2017  3:00 PM CDT   Nurse Only with NURSE ONLY MG DERM   [facility]    [address] [place] [address] [phone]            May 11, 2017  2:45 PM CDT   Return Visit with Isidro Goddard MD   [facility])    [address] [address] [address] [phone]              Who to contact     If you have questions or need follow up information about today's clinic visit or your schedule please contact [facility] directly at [phone].  Normal or non-critical lab and imaging results will be communicated to you by MyChart, letter or phone within 4 business days after the clinic has received the results. If you do not hear from us within 7 days, please contact the clinic through MyChart or phone. If you have a critical or abnormal lab result, we will notify you by phone as soon as possible.  Submit refill requests through [employer] or call your pharmacy and they will forward the refill request to us. Please allow 3 business days for your refill to be completed.          Additional Information About Your Visit        [employer] Information     [employer] is an electronic gateway that provides easy, online access to your medical records. With [employer], you can request a clinic appointment, read your test results, renew a prescription or communicate with your care team.     To sign up for [employer] visit the website at [email or web address]   You will be asked to enter the access code listed below, as well as some personal  information. Please follow the directions to create your username and password.     Your access code is: [email or web address]  Expires: 2017  3:57 PM     Your access code will  in 90 days. If you need help or a new code, please contact your [facility] or call [phone] for assistance.        Care EveryWhere ID     This is your Care EveryWhere ID. This could be used by other organizations to access your [place] medical records  [id number]        Your Vitals Were     Last Period                   2012            Blood Pressure from Last 3 Encounters:   17 128/83   16 128/88   16 119/77    Weight from Last 3 Encounters:   17 76.7 kg (169 lb)   16 77.5 kg (170 lb 12.8 oz)   16 75.3 kg (166 lb)              Today, you had the following     No orders found for display       Primary Care Provider Office Phone # Fax #    [facility] [phone]        [facility] [address] [place] [address] [address]  [facility] [address]        Thank you!     Thank you for choosing [facility]  for your care. Our goal is always to provide you with excellent care. Hearing back from our patients is one way we can continue to improve our services. Please take a few minutes to complete the written survey that you may receive in the mail after your visit with us. Thank you!             Your Updated Medication List - Protect others around you: Learn how to safely use, store and throw away your medicines at www.disposemymeds.org.          This list is accurate as of: 3/15/17  3:02 PM.  Always use your most recent med list.                   Brand Name Dispense Instructions for use    AEROCHAMBER MV Misc      Use as directed.  .       albuterol 108 (90 BASE) MCG/ACT Inhaler    PROAIR HFA/PROVENTIL HFA/VENTOLIN HFA    1 Inhaler    Inhale 2 puffs into the lungs every 4 hours as needed for shortness of breath / dyspnea       cyclobenzaprine  10 MG tablet    FLEXERIL    14 tablet    Take 1 tablet (10 mg) by mouth once as needed for muscle spasms (at bedtime)       fexofenadine 180 MG tablet    ALLEGRA     1 tablet daily. for allergy symptoms.       FISH OIL PO      Take 1 tablet by mouth daily as needed.       fluticasone 50 MCG/ACT spray    FLONASE    16 g    Spray 2 sprays into both nostrils daily       fluticasone-salmeterol 250-50 MCG/DOSE diskus inhaler    ADVAIR    1 Inhaler    Inhale 1 puff into the lungs every 12 hours       hydroquinone 4 % Crea     60 g    Apply twice a day to affected area of the face for 4 months max and then take a 1 month break.       levothyroxine 75 MCG tablet    SYNTHROID/LEVOTHROID    90 tablet    Take 1 tablet (75 mcg) by mouth daily       MULTIVITAMIN PO      Take 1 tablet by mouth daily.       ranitidine 300 MG tablet    ZANTAC    90 tablet    Take 1 tablet (300 mg) by mouth At Bedtime       REFRESH OP      Apply to eye as needed

## 2017-03-15 NOTE — NURSING NOTE
Stephenridanni Baugh comes into clinic today at the request of Dr. Rupesh mendez.        This service provided today was under the direct supervision of Dr. Kaylee Luis, who was available if needed.    Eduarda Alonzo LPN

## 2017-03-30 ENCOUNTER — OFFICE VISIT (OUTPATIENT)
Dept: FAMILY MEDICINE | Facility: CLINIC | Age: 57
End: 2017-03-30
Payer: COMMERCIAL

## 2017-03-30 VITALS
DIASTOLIC BLOOD PRESSURE: 78 MMHG | SYSTOLIC BLOOD PRESSURE: 113 MMHG | OXYGEN SATURATION: 94 % | HEART RATE: 89 BPM | BODY MASS INDEX: 26.84 KG/M2 | WEIGHT: 168.8 LBS | TEMPERATURE: 97.2 F

## 2017-03-30 DIAGNOSIS — J45.901 ASTHMA EXACERBATION: ICD-10-CM

## 2017-03-30 DIAGNOSIS — J01.90 ACUTE SINUSITIS WITH SYMPTOMS > 10 DAYS: Primary | ICD-10-CM

## 2017-03-30 PROCEDURE — 99214 OFFICE O/P EST MOD 30 MIN: CPT | Performed by: INTERNAL MEDICINE

## 2017-03-30 RX ORDER — PREDNISONE 20 MG/1
40 TABLET ORAL DAILY
Qty: 10 TABLET | Refills: 0 | Status: SHIPPED | OUTPATIENT
Start: 2017-03-30 | End: 2017-04-04

## 2017-03-30 NOTE — NURSING NOTE
"Chief Complaint   Patient presents with     URI       Initial /78 (BP Location: Right arm, Patient Position: Chair, Cuff Size: Adult Regular)  Pulse 89  Temp 97.2  F (36.2  C) (Oral)  Wt 168 lb 12.8 oz (76.6 kg)  LMP 01/13/2012  SpO2 94%  BMI 26.84 kg/m2 Estimated body mass index is 26.84 kg/(m^2) as calculated from the following:    Height as of 1/9/17: 5' 6.5\" (1.689 m).    Weight as of this encounter: 168 lb 12.8 oz (76.6 kg).  Medication Reconciliation: complete    Ramila Chawla CMA  "

## 2017-03-30 NOTE — PROGRESS NOTES
"  SUBJECTIVE:                                                    Carolina Baugh is a 57 year old female who presents to clinic today for the following health issues:      ENT Symptoms             Symptoms: cc Present Absent Comment   Fever/Chills  x  Chills the first day   Fatigue  x     Muscle Aches  x     Eye Irritation   x    Sneezing  x     Nasal Len/Drg  x  Nasal discharge is \"chunky\" and brown throughout the day   Sinus Pressure/Pain   x    Loss of smell   x    Dental pain   x    Sore Throat   x    Swollen Glands   x    Ear Pain/Fullness  x  Both ears itchy   Cough  x  Non-productive; may have nasal backdrip   Wheeze  x     Chest Pain   x    Shortness of breath  x     Rash   x    Other  x  headache     Symptom duration:  6 days   Symptom severity:  severe   Treatments tried: Robitussin. Ibuprofen, Inhaler; is using using a nasal saline rinse   Contacts:  daughter sick     Has a history of asthma, uses Advair bid; does not use albuterol, usually.            Problem list and histories reviewed & adjusted, as indicated.  Additional history: as documented    Patient Active Problem List   Diagnosis     Mild persistent asthma     Encounter for screening colonoscopy     CARDIOVASCULAR SCREENING; LDL GOAL LESS THAN 160     Sinusitis, chronic     Post-menopausal     Hypothyroidism     Mantoux: positive     Advance care planning     Injury of right shoulder     Cervical pain     Past Surgical History:   Procedure Laterality Date     BIOPSY BREAST Right      ENT SURGERY         Social History   Substance Use Topics     Smoking status: Never Smoker     Smokeless tobacco: Never Used      Comment: exposure to second hand smoke     Alcohol use No     Family History   Problem Relation Age of Onset     Asthma Mother      Thyroid Disease Sister      Respiratory Sister      sinus, 2 sisters     CANCER No family hx of      DIABETES No family hx of      Hypertension No family hx of      CEREBROVASCULAR DISEASE No family hx of  "     Glaucoma No family hx of      Macular Degeneration No family hx of          Current Outpatient Prescriptions   Medication Sig Dispense Refill     amoxicillin-clavulanate (AUGMENTIN) 875-125 MG per tablet Take 1 tablet by mouth 2 times daily 20 tablet 0     predniSONE (DELTASONE) 20 MG tablet Take 2 tablets (40 mg) by mouth daily for 5 days 10 tablet 0     hydroquinone 4 % CREA Apply twice a day to affected area of the face for 4 months max and then take a 1 month break. 60 g 3     levothyroxine (SYNTHROID/LEVOTHROID) 75 MCG tablet Take 1 tablet (75 mcg) by mouth daily 90 tablet 3     ranitidine (ZANTAC) 300 MG tablet Take 1 tablet (300 mg) by mouth At Bedtime 90 tablet 3     fluticasone (FLONASE) 50 MCG/ACT spray Spray 2 sprays into both nostrils daily 16 g 11     fluticasone-salmeterol (ADVAIR) 250-50 MCG/DOSE diskus inhaler Inhale 1 puff into the lungs every 12 hours 1 Inhaler 12     albuterol (PROAIR HFA/PROVENTIL HFA/VENTOLIN HFA) 108 (90 BASE) MCG/ACT Inhaler Inhale 2 puffs into the lungs every 4 hours as needed for shortness of breath / dyspnea 1 Inhaler 2     cyclobenzaprine (FLEXERIL) 10 MG tablet Take 1 tablet (10 mg) by mouth once as needed for muscle spasms (at bedtime) 14 tablet 0     Polyvinyl Alcohol-Povidone (REFRESH OP) Apply to eye as needed       Omega-3 Fatty Acids (FISH OIL PO) Take 1 tablet by mouth daily as needed.       fexofenadine (ALLEGRA) 180 MG tablet 1 tablet daily. for allergy symptoms.       MULTIVITAMIN OR Take 1 tablet by mouth daily.         Reviewed and updated as needed this visit by clinical staff  Tobacco  Allergies  Meds  Med Hx  Surg Hx  Fam Hx  Soc Hx      Reviewed and updated as needed this visit by Provider         ==============================================================  ROS:  Constitutional, HEENT, cardiovascular, pulmonary, GI, , musculoskeletal, neuro, skin, endocrine and psych systems are negative, except as otherwise noted.       OBJECTIVE:                                                     /78 (BP Location: Right arm, Patient Position: Chair, Cuff Size: Adult Regular)  Pulse 89  Temp 97.2  F (36.2  C) (Oral)  Wt 168 lb 12.8 oz (76.6 kg)  LMP 01/13/2012  SpO2 94%  BMI 26.84 kg/m2  Body mass index is 26.84 kg/(m^2).     GENERAL APPEARANCE: healthy, alert and in no distress  EYES: Eyes grossly normal to inspection, and conjunctivae and sclerae normal  HENT: ear canals and TM's normal, nose and mouth without ulcers or lesions, oropharynx clear and oral mucous membranes moist  NECK: no adenopathy, no asymmetry, masses, or scars   RESP:   diffuse wheezes on exam which improved but did not resolve with forced coughing.   CV: regular rate and rhythm, normal S1 S2, no S3 or S4, no murmur, click or rub, no peripheral edema and peripheral pulses strong  ABDOMEN: soft, nontender, no hepatosplenomegaly, no masses and bowel sounds normal  MS: no musculoskeletal defects are noted and gait is age appropriate without ataxia  SKIN: no suspicious lesions or rashes  NEURO: mentation intact and speech normal  PSYCH: mentation appears normal and affect normal/bright.      ASSESSMENT/PLAN:                                                        ICD-10-CM    1. Acute sinusitis with symptoms > 10 days J01.90 amoxicillin-clavulanate (AUGMENTIN) 875-125 MG per tablet   2. Asthma exacerbation J45.901 predniSONE (DELTASONE) 20 MG tablet     (J01.90) Acute sinusitis with symptoms > 10 days  (primary encounter diagnosis)  Comment: most likely viral-advised to start abx if not better after 10 days, sooner if worse  Plan: amoxicillin-clavulanate (AUGMENTIN) 875-125 MG         per tablet  As per orders above and patient instructions below.           (J45.901) Asthma exacerbation  Comment: as noted on exam, likely triggered by her current viral URTI  Plan: predniSONE (DELTASONE) 20 MG tablet        As per orders above and patient instructions below.      Patient Instructions   For  your asthma symptoms:  Please take the 5 days course or prednisone.  Continue your Advair.  Continue to use the albuterol inhaler as needed.  See below.    For your sinus symptoms:  Treat this as you would treat a regular cold. If your symptoms are not better after a total of 10 days, start to take the antibiotic.  If your symptoms become worse in the next few days, start to take the antibiotic. Otherwise, do not take the antibiotic.          Asthma (Adult)  Asthma is a disease where the medium and  small air passages within the lung go into spasm and restrict the flow of air. Inflammation and swelling of the airways cause further restriction. During an acute asthma attack, these factors cause difficulty breathing, wheezing, cough and chest tightness.    An asthma attack can be triggered by many things. Common triggers include infections such as the common cold, bronchitis, pneumonia. Irritants such as smoke or pollutants in the air, emotional upset, and exercise can also trigger an attack. In many adults with asthma, allergies to dust, mold, pollen and animal dander can cause an asthma attack. Skipping doses of daily asthma medicine can also bring on an asthma attack.  Asthma can be controlled using the proper medicines prescribed by your healthcare provider and avoiding exposure to known triggers including allergens and irritants.  Home care    Take prescribed medicine exactly at the times advised. If you need medicine such as from a hand held inhaler or aerosol breathing machine more than every 4 hours, contact your healthcare provider or seek immediate medical attention. If prescribed an antibiotic or prednisone, take all of the medicine as prescribed, even if you are feeling better after a few days.    Do not smoke. Avoid being exposed to the smoke of others.    Some people with asthma have worsening of their symptoms when they take aspirin and non-steroidal or fever-reducing medicines like ibuprofen and  "naproxen. Talk to your healthcare provider if you think this may apply to you.  Follow-up care  Follow up with your healthcare provider, or as advised. Always bring all of your current medicines to any appointments with your healthcare provider. Also bring a complete list of medications even those not taken for asthma. If you do not already have one, talk to your healthcare provider about developing a personalized \"Asthma Action Plan.\"  A pneumococcal (pneumonia) vaccine and yearly flu shot (every fall) are recommended. Ask your doctor about this.  When to seek medical advice  Call your healthcare provider right away if any of these occur:     Increased wheezing or shortness of breath    Need to use your inhalers more often than usual without relief    Fever of 100.4 F (38 C) or higher, or as directed by your healthcare provider    Coughing up lots of dark-colored or bloody sputum (mucus)    Chest pain with each breath    If you use a peak flow meter as part of an Asthma Action Plan, and you are still in the yellow zone (50% to 80%) 15 minutes after using inhaler medicine.  Call 911  Call 911 if any of the following occur    Trouble walking or talking because of shortness of breath    If you use a peak flow meter as part of an Asthma Action Plan and you are still in the red zone (less than 50%) 15 minutes after using inhaler medicine    Lips or fingernails turning gray or blue    6849-4568 [employer]. [address] [place] [address]. All rights reserved. This information is not intended as a substitute for professional medical care. Always follow your healthcare professional's instructions.                        Lesley Tellez MD  [facility]  "

## 2017-03-30 NOTE — MR AVS SNAPSHOT
After Visit Summary   3/30/2017    Carolina Baugh    MRN: [id number]           Patient Information     Date Of Birth          1960        Visit Information        Provider Department      3/30/2017 3:30 PM Lesley Tellez MD [facility]        Today's Diagnoses     Acute sinusitis with symptoms > 10 days    -  1    Asthma exacerbation          Care Instructions    For your asthma symptoms:  Please take the 5 days course or prednisone.  Continue your Advair.  Continue to use the albuterol inhaler as needed.  See below.    For your sinus symptoms:  Treat this as you would treat a regular cold. If your symptoms are not better after a total of 10 days, start to take the antibiotic.  If your symptoms become worse in the next few days, start to take the antibiotic. Otherwise, do not take the antibiotic.          Asthma (Adult)  Asthma is a disease where the medium and  small air passages within the lung go into spasm and restrict the flow of air. Inflammation and swelling of the airways cause further restriction. During an acute asthma attack, these factors cause difficulty breathing, wheezing, cough and chest tightness.    An asthma attack can be triggered by many things. Common triggers include infections such as the common cold, bronchitis, pneumonia. Irritants such as smoke or pollutants in the air, emotional upset, and exercise can also trigger an attack. In many adults with asthma, allergies to dust, mold, pollen and animal dander can cause an asthma attack. Skipping doses of daily asthma medicine can also bring on an asthma attack.  Asthma can be controlled using the proper medicines prescribed by your healthcare provider and avoiding exposure to known triggers including allergens and irritants.  Home care    Take prescribed medicine exactly at the times advised. If you need medicine such as from a hand held inhaler or aerosol breathing machine more than every 4 hours,  "contact your healthcare provider or seek immediate medical attention. If prescribed an antibiotic or prednisone, take all of the medicine as prescribed, even if you are feeling better after a few days.    Do not smoke. Avoid being exposed to the smoke of others.    Some people with asthma have worsening of their symptoms when they take aspirin and non-steroidal or fever-reducing medicines like ibuprofen and naproxen. Talk to your healthcare provider if you think this may apply to you.  Follow-up care  Follow up with your healthcare provider, or as advised. Always bring all of your current medicines to any appointments with your healthcare provider. Also bring a complete list of medications even those not taken for asthma. If you do not already have one, talk to your healthcare provider about developing a personalized \"Asthma Action Plan.\"  A pneumococcal (pneumonia) vaccine and yearly flu shot (every fall) are recommended. Ask your doctor about this.  When to seek medical advice  Call your healthcare provider right away if any of these occur:     Increased wheezing or shortness of breath    Need to use your inhalers more often than usual without relief    Fever of 100.4 F (38 C) or higher, or as directed by your healthcare provider    Coughing up lots of dark-colored or bloody sputum (mucus)    Chest pain with each breath    If you use a peak flow meter as part of an Asthma Action Plan, and you are still in the yellow zone (50% to 80%) 15 minutes after using inhaler medicine.  Call 911  Call 911 if any of the following occur    Trouble walking or talking because of shortness of breath    If you use a peak flow meter as part of an Asthma Action Plan and you are still in the red zone (less than 50%) 15 minutes after using inhaler medicine    Lips or fingernails turning gray or blue    0523-5589 [employer]. [address] [place] [address], [place] [address]. All rights reserved. This information is not intended " "as a substitute for professional medical care. Always follow your healthcare professional's instructions.              Follow-ups after your visit        Your next 10 appointments already scheduled     Apr 12, 2017  3:00 PM CDT   Nurse Only with NURSE ONLY MG HENDERSON   [facility])    [address] [address] [place] [id number] [phone]            May 11, 2017  2:45 PM CDT   Return Visit with Isidro Goddard MD   [facility])    [address] [address] [place] [id number] [phone]              Who to contact     If you have questions or need follow up information about today's clinic visit or your schedule please contact [facility] directly at [phone].  Normal or non-critical lab and imaging results will be communicated to you by MyChart, letter or phone within 4 business days after the clinic has received the results. If you do not hear from us within 7 days, please contact the clinic through [employer] or phone. If you have a critical or abnormal lab result, we will notify you by phone as soon as possible.  Submit refill requests through [employer] or call your pharmacy and they will forward the refill request to us. Please allow 3 business days for your refill to be completed.          Additional Information About Your Visit        MyChart Information     [employer] lets you send messages to your doctor, view your test results, renew your prescriptions, schedule appointments and more. To sign up, go to [email or web address] . Click on \"Log in\" on the left side of the screen, which will take you to the Welcome page. Then click on \"Sign up Now\" on the right side of the page.     You will be asked to enter the access code listed below, as well as some personal information. Please follow the directions to create your username and password.     Your access code is: [email or web address]  Expires: 5/11/2017 "  3:57 PM     Your access code will  in 90 days. If you need help or a new code, please call your [facility] clinic or [phone].        Care EveryWhere ID     This is your Care EveryWhere ID. This could be used by other organizations to access your [facility] medical records  [id number]        Your Vitals Were     Pulse Temperature Last Period Pulse Oximetry BMI (Body Mass Index)       89 97.2  F (36.2  C) (Oral) 2012 94% 26.84 kg/m2        Blood Pressure from Last 3 Encounters:   17 113/78   17 128/83   16 128/88    Weight from Last 3 Encounters:   17 168 lb 12.8 oz (76.6 kg)   17 169 lb (76.7 kg)   16 170 lb 12.8 oz (77.5 kg)              We Performed the Following     Asthma Action Plan (AAP)          Today's Medication Changes          These changes are accurate as of: 3/30/17  4:24 PM.  If you have any questions, ask your nurse or doctor.               Start taking these medicines.        Dose/Directions    amoxicillin-clavulanate 875-125 MG per tablet   Commonly known as:  AUGMENTIN   Used for:  Acute sinusitis with symptoms > 10 days   Started by:  eLsley Tellez MD        Dose:  1 tablet   Take 1 tablet by mouth 2 times daily   Quantity:  20 tablet   Refills:  0       predniSONE 20 MG tablet   Commonly known as:  DELTASONE   Used for:  Asthma exacerbation   Started by:  Lesley Tellez MD        Dose:  40 mg   Take 2 tablets (40 mg) by mouth daily for 5 days   Quantity:  10 tablet   Refills:  0            Where to get your medicines      These medications were sent to [facility] [place] - [address]  [address], [facility] [address]     Phone:  [phone]     amoxicillin-clavulanate 875-125 MG per tablet    predniSONE 20 MG tablet                Primary Care Provider Office Phone # Fax #    [facility] [phone]        [facility] [address] [place] [address]  [address]  [facility] [address]        Thank you!     Thank you for choosing [facility]  for your care. Our goal is always to provide you with excellent care. Hearing back from our patients is one way we can continue to improve our services. Please take a few minutes to complete the written survey that you may receive in the mail after your visit with us. Thank you!             Your Updated Medication List - Protect others around you: Learn how to safely use, store and throw away your medicines at www.disposemymeds.org.          This list is accurate as of: 3/30/17  4:24 PM.  Always use your most recent med list.                   Brand Name Dispense Instructions for use    albuterol 108 (90 BASE) MCG/ACT Inhaler    PROAIR HFA/PROVENTIL HFA/VENTOLIN HFA    1 Inhaler    Inhale 2 puffs into the lungs every 4 hours as needed for shortness of breath / dyspnea       amoxicillin-clavulanate 875-125 MG per tablet    AUGMENTIN    20 tablet    Take 1 tablet by mouth 2 times daily       cyclobenzaprine 10 MG tablet    FLEXERIL    14 tablet    Take 1 tablet (10 mg) by mouth once as needed for muscle spasms (at bedtime)       fexofenadine 180 MG tablet    ALLEGRA     1 tablet daily. for allergy symptoms.       FISH OIL PO      Take 1 tablet by mouth daily as needed.       fluticasone 50 MCG/ACT spray    FLONASE    16 g    Spray 2 sprays into both nostrils daily       fluticasone-salmeterol 250-50 MCG/DOSE diskus inhaler    ADVAIR    1 Inhaler    Inhale 1 puff into the lungs every 12 hours       hydroquinone 4 % Crea     60 g    Apply twice a day to affected area of the face for 4 months max and then take a 1 month break.       levothyroxine 75 MCG tablet    SYNTHROID/LEVOTHROID    90 tablet    Take 1 tablet (75 mcg) by mouth daily       MULTIVITAMIN PO      Take 1 tablet by mouth daily.       predniSONE 20 MG tablet    DELTASONE    10 tablet    Take 2 tablets (40 mg) by mouth daily for 5 days       ranitidine 300 MG  tablet    ZANTAC    90 tablet    Take 1 tablet (300 mg) by mouth At Bedtime       REFRESH OP      Apply to eye as needed

## 2017-03-30 NOTE — PATIENT INSTRUCTIONS
For your asthma symptoms:  Please take the 5 days course or prednisone.  Continue your Advair.  Continue to use the albuterol inhaler as needed.  See below.    For your sinus symptoms:  Treat this as you would treat a regular cold. If your symptoms are not better after a total of 10 days, start to take the antibiotic.  If your symptoms become worse in the next few days, start to take the antibiotic. Otherwise, do not take the antibiotic.          Asthma (Adult)  Asthma is a disease where the medium and  small air passages within the lung go into spasm and restrict the flow of air. Inflammation and swelling of the airways cause further restriction. During an acute asthma attack, these factors cause difficulty breathing, wheezing, cough and chest tightness.    An asthma attack can be triggered by many things. Common triggers include infections such as the common cold, bronchitis, pneumonia. Irritants such as smoke or pollutants in the air, emotional upset, and exercise can also trigger an attack. In many adults with asthma, allergies to dust, mold, pollen and animal dander can cause an asthma attack. Skipping doses of daily asthma medicine can also bring on an asthma attack.  Asthma can be controlled using the proper medicines prescribed by your healthcare provider and avoiding exposure to known triggers including allergens and irritants.  Home care    Take prescribed medicine exactly at the times advised. If you need medicine such as from a hand held inhaler or aerosol breathing machine more than every 4 hours, contact your healthcare provider or seek immediate medical attention. If prescribed an antibiotic or prednisone, take all of the medicine as prescribed, even if you are feeling better after a few days.    Do not smoke. Avoid being exposed to the smoke of others.    Some people with asthma have worsening of their symptoms when they take aspirin and non-steroidal or fever-reducing medicines like ibuprofen and  "naproxen. Talk to your healthcare provider if you think this may apply to you.  Follow-up care  Follow up with your healthcare provider, or as advised. Always bring all of your current medicines to any appointments with your healthcare provider. Also bring a complete list of medications even those not taken for asthma. If you do not already have one, talk to your healthcare provider about developing a personalized \"Asthma Action Plan.\"  A pneumococcal (pneumonia) vaccine and yearly flu shot (every fall) are recommended. Ask your doctor about this.  When to seek medical advice  Call your healthcare provider right away if any of these occur:     Increased wheezing or shortness of breath    Need to use your inhalers more often than usual without relief    Fever of 100.4 F (38 C) or higher, or as directed by your healthcare provider    Coughing up lots of dark-colored or bloody sputum (mucus)    Chest pain with each breath    If you use a peak flow meter as part of an Asthma Action Plan, and you are still in the yellow zone (50% to 80%) 15 minutes after using inhaler medicine.  Call 911  Call 911 if any of the following occur    Trouble walking or talking because of shortness of breath    If you use a peak flow meter as part of an Asthma Action Plan and you are still in the red zone (less than 50%) 15 minutes after using inhaler medicine    Lips or fingernails turning gray or blue    7777-7067 [employer]. [address] [place] [address], [place] [address]. All rights reserved. This information is not intended as a substitute for professional medical care. Always follow your healthcare professional's instructions.        "

## 2017-04-12 ENCOUNTER — ALLIED HEALTH/NURSE VISIT (OUTPATIENT)
Dept: NURSING | Facility: CLINIC | Age: 57
End: 2017-04-12

## 2017-04-12 DIAGNOSIS — L81.1 MELASMA: ICD-10-CM

## 2017-04-12 DIAGNOSIS — L81.0 POST-INFLAMMATORY HYPERPIGMENTATION: Primary | ICD-10-CM

## 2017-04-12 PROCEDURE — 96999 UNLISTED SPEC DERM SVC/PX: CPT

## 2017-04-12 NOTE — PATIENT INSTRUCTIONS
Skin Peel Post Care Instructions    I will experience redness, swelling, peeling, pain, and heat sensation which can last 5-10days and may persist for 2-3weeks. I may experience itching, or acne. Risks are permanent scarring, temporary or permanent skin lightening or darkening, infection, and eye injury. I understand my outcome could be no improvement or slight improvement. Multiple treatments may be required.     What can I expect?    Skin peeling for three to five days    Flaking    Skin darkening    Redness    How do I take care of my skin?    Patient compliance is mandatory for an optimal outcome and to avoid complications    Wear sunscreen (SPF 30 or greater) and a hat. Stay out of the sun for three to four weeks     Use a gentle skin care regimen with a sunscreen with at least an SPF of 30 or more. Examples include the following:   o SkinCeuticals Gentle Cleanser and SkinCeuticals Physical Matte UV DEFENSE SPF 50   o Cetaphil Soap followed by Cetaphil Sunscreen in the am and pm    Put on a bland moisturizer (Aquaphor or Vaseline) if sunscreen stings    Do not pick at peel or peel the skin. This will cause scarring.    Wait to use medicated creams until the skin has healed. This occurs five to seven days later    You can use make-up after 24 hours. Make sure make-up does NOT contain salicylic acid.     Eye make-up and lipstick are okay immediately after procedure    Do not use facial scrubs, depilatories, steam, any exfoliation procedures, etc. on your face (or treated area of skin) for one week post-peel    When should I call the doctor?    Cold sores    Swelling    Crusting    Who should I call with questions?    [facility]: [phone]     [facility]: [phone]    For urgent needs outside of business hours call the [facility] at [phone] and ask for the dermatology resident on call

## 2017-04-12 NOTE — NURSING NOTE
Carolina Baugh comes into clinic today at the request of Dr. Goddard Ordering Provider for Micropeel plus peel 20%.        This service provided today was under the supervising provider of the day Dr. Arreola, who was available if needed.    Eduarda Alonzo LPN

## 2017-04-12 NOTE — NURSING NOTE
Nursing Peel Treatment Record:  Apr 12, 2017    Pre peel:    Any changes in health or medications: No    Strawberry or aspirin allergy(If yes, then no salicylic acid peels to be given and procedure to be held): No    Sulfa allergy(If yes, then SkinCeuticals sensitive skin peel procedure to be held): N/A    Pregnant or breastfeeding(If yes, peel procedure to be held): No    Baseline photo obtained(3 views): Yes    Areas treated today: face    Treatment #: 3 of 6.    Confirmed that retinoid was stopped 7 days prior to peel: Yes    Peel Type: Micropeel plus 20%    Confirmed that patient has not had electrolysis, depilatory creams, waxing or laser hair removal in the last week: Yes    Peel record:    Eye shields were placed.     Area to be treated was cleansed with Simply Clean Cleanser using rough 4X4 gauze pads.    Area was then toned with with the Conditioning Solution using rough 4X4 gauze pads.    Then, the areas were degreased with acetone. Time-out was performed to evaluate for any sensitive skin.      Hydrabalm was then applied to the lips, perinasal region and near the outer canthi.     The peel was applied with infante swabs for 3 passes, then removed with water dampened 4X4 soft gauze.     Phytocorrective gel and epidermal repair topicals were applied and followed by Sunscreen (Sheer Physical UV defence SPF 50).       Post peel:    Patient reminded of need to wait to use medicated creams until the skin has healed. This occurs five to seven days later. Post peel care instructions provided including need for sun avoidance. Patient tolerated peel well, no complications noted.       Payment:  The patient paid for a package of 3 peels on 2-15-17. This is peel 3 of 3.    Madina Lerner LPN

## 2017-04-12 NOTE — MR AVS SNAPSHOT
After Visit Summary   4/12/2017    Carolina Baugh    MRN: [id number]           Patient Information     Date Of Birth          1960        Visit Information        Provider Department      4/12/2017 3:00 PM NURSE ONLY [facility]        Care Instructions    Skin Peel Post Care Instructions    I will experience redness, swelling, peeling, pain, and heat sensation which can last 5-10days and may persist for 2-3weeks. I may experience itching, or acne. Risks are permanent scarring, temporary or permanent skin lightening or darkening, infection, and eye injury. I understand my outcome could be no improvement or slight improvement. Multiple treatments may be required.     What can I expect?    Skin peeling for three to five days    Flaking    Skin darkening    Redness    How do I take care of my skin?    Patient compliance is mandatory for an optimal outcome and to avoid complications    Wear sunscreen (SPF 30 or greater) and a hat. Stay out of the sun for three to four weeks     Use a gentle skin care regimen with a sunscreen with at least an SPF of 30 or more. Examples include the following:   o SkinCeuticals Gentle Cleanser and SkinCeuticals Physical Matte UV DEFENSE SPF 50   o Cetaphil Soap followed by Cetaphil Sunscreen in the am and pm    Put on a bland moisturizer (Aquaphor or Vaseline) if sunscreen stings    Do not pick at peel or peel the skin. This will cause scarring.    Wait to use medicated creams until the skin has healed. This occurs five to seven days later    You can use make-up after 24 hours. Make sure make-up does NOT contain salicylic acid.     Eye make-up and lipstick are okay immediately after procedure    Do not use facial scrubs, depilatories, steam, any exfoliation procedures, etc. on your face (or treated area of skin) for one week post-peel    When should I call the doctor?    Cold sores    Swelling    Crusting    Who should I call with  questions?    [facility]: [phone]     [facility]: [phone]    For urgent needs outside of business hours call the [facility] at [phone] and ask for the dermatology resident on call            Follow-ups after your visit        Your next 10 appointments already scheduled     May 11, 2017  2:45 PM CDT   Return Visit with Isidro Goddard MD   [facility] ([facility]    [address] [place] [id number] [phone]              Who to contact     If you have questions or need follow up information about today's clinic visit or your schedule please contact [facility] directly at [phone].  Normal or non-critical lab and imaging results will be communicated to you by MyChart, letter or phone within 4 business days after the clinic has received the results. If you do not hear from us within 7 days, please contact the clinic through MyChart or phone. If you have a critical or abnormal lab result, we will notify you by phone as soon as possible.  Submit refill requests through [employer] or call your pharmacy and they will forward the refill request to us. Please allow 3 business days for your refill to be completed.          Additional Information About Your Visit        [employer] Information     [employer] is an electronic gateway that provides easy, online access to your medical records. With [employer], you can request a clinic appointment, read your test results, renew a prescription or communicate with your care team.     To sign up for [employer] visit the website at [email or web address]   You will be asked to enter the access code listed below, as well as some personal information. Please follow the directions to create your username and password.     Your access code is: [email or web address]  Expires: 2017  3:57 PM     Your access code will  in 90 days. If you  need help or a new code, please contact your [facility] or call [phone] for assistance.        Care EveryWhere ID     This is your Care EveryWhere ID. This could be used by other organizations to access your [place] medical records  [id number]        Your Vitals Were     Last Period                   01/13/2012            Blood Pressure from Last 3 Encounters:   03/30/17 113/78   01/09/17 128/83   11/29/16 128/88    Weight from Last 3 Encounters:   03/30/17 76.6 kg (168 lb 12.8 oz)   01/09/17 76.7 kg (169 lb)   11/29/16 77.5 kg (170 lb 12.8 oz)              Today, you had the following     No orders found for display       Primary Care Provider Office Phone # Fax #    [facility] [phone]        [facility] [address] [facility] [address]  [facility] [address]        Thank you!     Thank you for choosing [facility]  for your care. Our goal is always to provide you with excellent care. Hearing back from our patients is one way we can continue to improve our services. Please take a few minutes to complete the written survey that you may receive in the mail after your visit with us. Thank you!             Your Updated Medication List - Protect others around you: Learn how to safely use, store and throw away your medicines at www.disposemymeds.org.          This list is accurate as of: 4/12/17  3:15 PM.  Always use your most recent med list.                   Brand Name Dispense Instructions for use    albuterol 108 (90 BASE) MCG/ACT Inhaler    PROAIR HFA/PROVENTIL HFA/VENTOLIN HFA    1 Inhaler    Inhale 2 puffs into the lungs every 4 hours as needed for shortness of breath / dyspnea       amoxicillin-clavulanate 875-125 MG per tablet    AUGMENTIN    20 tablet    Take 1 tablet by mouth 2 times daily       cyclobenzaprine 10 MG tablet    FLEXERIL    14 tablet    Take 1 tablet (10 mg) by mouth once as needed for muscle spasms (at bedtime)        fexofenadine 180 MG tablet    ALLEGRA     1 tablet daily. for allergy symptoms.       FISH OIL PO      Take 1 tablet by mouth daily as needed.       fluticasone 50 MCG/ACT spray    FLONASE    16 g    Spray 2 sprays into both nostrils daily       fluticasone-salmeterol 250-50 MCG/DOSE diskus inhaler    ADVAIR    1 Inhaler    Inhale 1 puff into the lungs every 12 hours       hydroquinone 4 % Crea     60 g    Apply twice a day to affected area of the face for 4 months max and then take a 1 month break.       levothyroxine 75 MCG tablet    SYNTHROID/LEVOTHROID    90 tablet    Take 1 tablet (75 mcg) by mouth daily       MULTIVITAMIN PO      Take 1 tablet by mouth daily.       ranitidine 300 MG tablet    ZANTAC    90 tablet    Take 1 tablet (300 mg) by mouth At Bedtime       REFRESH OP      Apply to eye as needed

## 2017-05-11 ENCOUNTER — OFFICE VISIT (OUTPATIENT)
Dept: DERMATOLOGY | Facility: CLINIC | Age: 57
End: 2017-05-11
Payer: COMMERCIAL

## 2017-05-11 DIAGNOSIS — L81.1 MELASMA: Primary | ICD-10-CM

## 2017-05-11 PROCEDURE — 99213 OFFICE O/P EST LOW 20 MIN: CPT | Performed by: DERMATOLOGY

## 2017-05-11 NOTE — PATIENT INSTRUCTIONS
Azelaic acid: 10% is over the counter. Try [employer].    Keep on using Differin and Azelaic acid and sun screen even when taking a break from Hydroquinone.  Hydroquinone once a day, Differin once a day, azelic acid once a day. YOu can combine it.

## 2017-05-11 NOTE — NURSING NOTE
Dermatology Rooming Note    Carolina Baugh's goals for this visit include:   Chief Complaint   Patient presents with     RECHECK     discoloration is better        Is a scribe okay for this visit:YES    Are records needed for this visit(If yes, obtain release of information): No     Vitals: LMP 01/13/2012    Referring Provider:  No referring provider defined for this encounter.

## 2017-05-11 NOTE — LETTER
5/11/2017       RE: Carolina Baugh  [address]  [place] [facility] [address]     Dear Colleague,    Thank you for referring your patient, Carolina Baugh, to the [facility]. Please see a copy of my visit note below.    Called patient on 5/15/17 and left detailed message indicating directions for tranexmic acid use.    Medication ordered.    Isidro Goddard MD, MS    Department of Dermatology  [facility]: Phone: [phone], Fax:[phone]  [facility]: Phone: [phone], Fax: [phone]        [facility] Dermatology Note      Dermatology Problem List:  1. Melasma and post inflammatory hyperpigmentation, zygoma  -Current Tx: 4% Hydroquinone (initiated 2/10/2017), Differin 0.1% gel (initiated 2/10/2017), and 10% Azelaic acid.   -Consider Tranaxemic acid.  -Previous Tx: Chemical peels (X1 sensitive skin peel, X1 20% sal 3% glycolic, X1 20%)  2. Dermatosis Papillomatosis nigra    Encounter Date: May 11, 2017    CC:  Chief Complaint   Patient presents with     RECHECK     discoloration is better          History of Present Illness:  Ms. Carolina Baugh is a 57 year old female who presents as a follow up for skin discoloration. The patient was last seen 2/10/2017 when hydroquinone 4% cream. Differin and chemical peels were started. Today, the patient reports that she uses hydroquinone 1-2 times per day and daily sunscreen use. She is most concerned with discoloration on the right temple and right cheek. With topical treatment and chemical peels, the areas are improved compared to her last visit but she is looking for further improvement. She no longer has her menses. She doesn't smoke. No family history of clotting disorders. No family history of strokes or MIs. Also no bleeding disorders to her  knowledge. The patient reports no other lesions of concern.    Past Medical History:   Patient Active Problem List   Diagnosis     Mild persistent asthma     Encounter for screening colonoscopy     CARDIOVASCULAR SCREENING; LDL GOAL LESS THAN 160     Sinusitis, chronic     Post-menopausal     Hypothyroidism     Mantoux: positive     Advance care planning     Injury of right shoulder     Cervical pain     Past Medical History:   Diagnosis Date     Asthma      Dry eye syndrome      Encounter for screening colonoscopy 11/11/11    Normal, next 10 years 2021     Hyperthyroidism      MGD (meibomian gland disease)      Past Surgical History:   Procedure Laterality Date     BIOPSY BREAST Right      ENT SURGERY       Social History:  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 The patient denies use of tanning beds. Denies use of cigarettes or smoking.    Family History:  There is no family history of skin cancer.  There is no family history of stroke, clotting disorder, bleeding disorder, or MI.     Medications:  Current Outpatient Prescriptions   Medication Sig Dispense Refill     amoxicillin-clavulanate (AUGMENTIN) 875-125 MG per tablet Take 1 tablet by mouth 2 times daily 20 tablet 0     hydroquinone 4 % CREA Apply twice a day to affected area of the face for 4 months max and then take a 1 month break. 60 g 3     levothyroxine (SYNTHROID/LEVOTHROID) 75 MCG tablet Take 1 tablet (75 mcg) by mouth daily 90 tablet 3     ranitidine (ZANTAC) 300 MG tablet Take 1 tablet (300 mg) by mouth At Bedtime 90 tablet 3     fluticasone (FLONASE) 50 MCG/ACT spray Spray 2 sprays into both nostrils daily 16 g 11     fluticasone-salmeterol (ADVAIR) 250-50 MCG/DOSE diskus inhaler Inhale 1 puff into the lungs every 12 hours 1 Inhaler 12     albuterol (PROAIR HFA/PROVENTIL HFA/VENTOLIN HFA) 108 (90 BASE) MCG/ACT Inhaler Inhale 2 puffs into the lungs every 4 hours as needed for shortness of breath / dyspnea 1 Inhaler 2     cyclobenzaprine (FLEXERIL) 10 MG  tablet Take 1 tablet (10 mg) by mouth once as needed for muscle spasms (at bedtime) 14 tablet 0     Polyvinyl Alcohol-Povidone (REFRESH OP) Apply to eye as needed       Omega-3 Fatty Acids (FISH OIL PO) Take 1 tablet by mouth daily as needed.       fexofenadine (ALLEGRA) 180 MG tablet 1 tablet daily. for allergy symptoms.       MULTIVITAMIN OR Take 1 tablet by mouth daily.       No Known Allergies    Review of Systems:  -GEN: No smoking.  -Nephro:: Denies history of kidney disease.   -Skin: As above in HPI. No additional skin concerns.  -Gyn: in menopause, no miscarriages or spontaneous abortions, not on hormonal therapy  -CV: No MI, no strokes, no DVT  -RESP: No PE    Physical exam:  Vitals: LMP 01/13/2012  GEN: This is a well developed, well-nourished female in no acute distress, in a pleasant mood.    PSYCH: In pleasant mood, appropriate affect  SKIN: Focused examination of the face was performed.  -Lacy hyperpigmented macules improved from initial appointment on the left temple and zygoma.   -Lacy hyperpigmented patches, improved from initial appointment but less so than the left, on the right zygoma and right center cheek.   -No other lesions of concern on areas examined.     Impression/Plan:  1. Melasma: improved with chemical peels x3 and application of 4% hydroquinone and Differin 0.1% gel as well as strict sun protection. Improved but will attempt greater improvement though I emphasized that improvements will be slow and incremental.     Continue hydroquinone 4% cream. Apply daily for a total of 4 months then take a 1 week break.  Discussed importance of taking break and risk of darkening of skin with overuse.     Continue Differin 0.1% gel. Apply nightly as tolerated. No need to take a break here.    Patient may continue chemical peels if she would want.     Start OTC Azelaic acid 10% for additional lightening without need for taking a break.    Start Tranaxemic acid 650mg pill broken into two and take 1/2  pill (325mg) twice a day for 3 months.    No history of clotting disorders, bleeding disorders, MI, stroke. Pt doesn't smoke and not on hormonal therapy.     Start over the counter azelaic acid 10% gel. Apply daily to the face.     Follow up in 3-4 months, earlier for new or changing lesions.     Staff Involved:  Scribe/Staff    Scribe Disclosure:   I, Susie China, am serving as a scribe to document services personally performed by Dr. Isidro Goddard, based on data collection and the provider's statements to me.      Provider Disclosure:   I have reviewed the documentation recorded by the scribe and have edited it as needed. I have personally performed the services documented here and the documentation accurately represents those services and the decisions made by me.     Isidro Goddard MD, MS    Department of Dermatology  [facility]: Phone: [phone], Fax:[phone]  [facility]: Phone: [phone], Fax: [phone]      Again, thank you for allowing me to participate in the care of your patient.      Sincerely,    Isidro Goddard MD

## 2017-05-11 NOTE — MR AVS SNAPSHOT
After Visit Summary   5/11/2017    Carolina Baugh    MRN: [id number]           Patient Information     Date Of Birth          1960        Visit Information        Provider Department      5/11/2017 2:45 PM Isidro Goddard MD [facility]        Today's Diagnoses     Melasma    -  1      Care Instructions    Azelaic acid: 10% is over the counter. Try [employer].    Keep on using Differin and Azelaic acid and sun screen even when taking a break from Hydroquinone.  Hydroquinone once a day, Differin once a day, azelic acid once a day. YOu can combine it.         Follow-ups after your visit        Who to contact     If you have questions or need follow up information about today's clinic visit or your schedule please contact [facility] directly at [phone].  Normal or non-critical lab and imaging results will be communicated to you by [employer], letter or phone within 4 business days after the clinic has received the results. If you do not hear from us within 7 days, please contact the clinic through MyChart or phone. If you have a critical or abnormal lab result, we will notify you by phone as soon as possible.  Submit refill requests through [email or web address] or call your pharmacy and they will forward the refill request to us. Please allow 3 business days for your refill to be completed.          Additional Information About Your Visit        MyChart Information     [email or web address]) is an electronic gateway that provides easy, online access to your medical records. With [email or web address]), you can request a clinic appointment, read your test results, renew a prescription or communicate with your care team.     To sign up for [email or web address]) visit the website at [email or web address]   You will be asked to enter the access code listed below, as well as some personal information. Please follow the directions to create your username and password.     Your access code is: [email or web address]  Expires:  2017  3:57 PM     Your access code will  in 90 days. If you need help or a new code, please contact your [facility] or call [phone] for assistance.        Care EveryWhere ID     This is your Care EveryWhere ID. This could be used by other organizations to access your [place] medical records  [id number]        Your Vitals Were     Last Period                   2012            Blood Pressure from Last 3 Encounters:   17 113/78   17 128/83   16 128/88    Weight from Last 3 Encounters:   17 168 lb 12.8 oz (76.6 kg)   17 169 lb (76.7 kg)   16 170 lb 12.8 oz (77.5 kg)              Today, you had the following     No orders found for display       Primary Care Provider Office Phone # Fax #    [facility] [phone]        [facility] [address] [facility] [address]  [facility] [address]        Thank you!     Thank you for choosing [facility]  for your care. Our goal is always to provide you with excellent care. Hearing back from our patients is one way we can continue to improve our services. Please take a few minutes to complete the written survey that you may receive in the mail after your visit with us. Thank you!             Your Updated Medication List - Protect others around you: Learn how to safely use, store and throw away your medicines at www.disposemymeds.org.          This list is accurate as of: 17  2:56 PM.  Always use your most recent med list.                   Brand Name Dispense Instructions for use    albuterol 108 (90 BASE) MCG/ACT Inhaler    PROAIR HFA/PROVENTIL HFA/VENTOLIN HFA    1 Inhaler    Inhale 2 puffs into the lungs every 4 hours as needed for shortness of breath / dyspnea       amoxicillin-clavulanate 875-125 MG per tablet    AUGMENTIN    20 tablet    Take 1 tablet by mouth 2 times daily       cyclobenzaprine 10 MG tablet    FLEXERIL    14 tablet    Take 1  tablet (10 mg) by mouth once as needed for muscle spasms (at bedtime)       fexofenadine 180 MG tablet    ALLEGRA     1 tablet daily. for allergy symptoms.       FISH OIL PO      Take 1 tablet by mouth daily as needed.       fluticasone 50 MCG/ACT spray    FLONASE    16 g    Spray 2 sprays into both nostrils daily       fluticasone-salmeterol 250-50 MCG/DOSE diskus inhaler    ADVAIR    1 Inhaler    Inhale 1 puff into the lungs every 12 hours       hydroquinone 4 % Crea     60 g    Apply twice a day to affected area of the face for 4 months max and then take a 1 month break.       levothyroxine 75 MCG tablet    SYNTHROID/LEVOTHROID    90 tablet    Take 1 tablet (75 mcg) by mouth daily       MULTIVITAMIN PO      Take 1 tablet by mouth daily.       ranitidine 300 MG tablet    ZANTAC    90 tablet    Take 1 tablet (300 mg) by mouth At Bedtime       REFRESH OP      Apply to eye as needed
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
## 2017-05-15 RX ORDER — TRANEXAMIC ACID 650 MG/1
TABLET ORAL
Qty: 15 TABLET | Refills: 2 | Status: SHIPPED | OUTPATIENT
Start: 2017-05-15 | End: 2017-11-16

## 2017-05-15 NOTE — PROGRESS NOTES
Called patient on 5/15/17 and left detailed message indicating directions for tranexmic acid use.    Medication ordered.    Isidro Goddard MD, MS    Department of Dermatology  [facility]: Phone: [phone], Fax:[phone]  [facility]: Phone: [phone], Fax: [phone]

## 2017-06-07 ENCOUNTER — OFFICE VISIT (OUTPATIENT)
Dept: FAMILY MEDICINE | Facility: CLINIC | Age: 57
End: 2017-06-07
Payer: COMMERCIAL

## 2017-06-07 VITALS
HEART RATE: 72 BPM | BODY MASS INDEX: 26.68 KG/M2 | HEIGHT: 67 IN | TEMPERATURE: 98.5 F | WEIGHT: 170 LBS | DIASTOLIC BLOOD PRESSURE: 80 MMHG | OXYGEN SATURATION: 98 % | SYSTOLIC BLOOD PRESSURE: 121 MMHG

## 2017-06-07 DIAGNOSIS — N64.4 MASTODYNIA: Primary | ICD-10-CM

## 2017-06-07 PROCEDURE — 99213 OFFICE O/P EST LOW 20 MIN: CPT | Performed by: PREVENTIVE MEDICINE

## 2017-06-07 ASSESSMENT — PAIN SCALES - GENERAL: PAINLEVEL: MODERATE PAIN (4)

## 2017-06-07 NOTE — NURSING NOTE
"Chief Complaint   Patient presents with     Breast Pain       Initial /80  Pulse 72  Temp 98.5  F (36.9  C) (Oral)  Ht 5' 6.5\" (1.689 m)  Wt 170 lb (77.1 kg)  LMP 01/13/2012  SpO2 98%  Breastfeeding? No  BMI 27.03 kg/m2 Estimated body mass index is 27.03 kg/(m^2) as calculated from the following:    Height as of this encounter: 5' 6.5\" (1.689 m).    Weight as of this encounter: 170 lb (77.1 kg).  Medication Reconciliation: sunita Boston MA        "

## 2017-06-07 NOTE — PATIENT INSTRUCTIONS
At [facility], we strive to deliver an exceptional experience to you, every time we see you.    If you receive a survey in the mail, please send us back your thoughts. We really do value your feedback.    Thank you for visiting [facility]    Normal or non-critical lab and imaging results will be communicated to you by MyChart, letter or phone within 7 days.  If you do not hear from us within 10 days, please call the clinic. If you have a critical or abnormal lab result, we will notify you by phone as soon as possible.     If you have any questions regarding your visit please contact:     Team [facility]/Spirit  Clinic Hours Telephone Number   Dr. Clay Grace   7am-7pm  Monday through Thursday  7am-5pm Friday [phone]  Anegla VENTURA RN   Pharmacy 8:30am-9pm Monday-Friday    9am-5pm Saturday-Sunday [phone]   Urgent Care 11am-9pm Monday-Friday        9am-5pm Saturday-Sunday [phone]     After hours, weekend or if you need to make an appointment with your primary provider please call [phone].   After Hours nurse advise: call [place] [facility]: [phone]    Use [employer] (secure email communication and access to your chart) to send your primary care provider a message or make an appointment. Ask someone on your Team how to sign up for [employer]. To log on to [employer] or for more information in [facility] please visit the website at [email or web address].   As of October 8, 2013, all password changes, disabled accounts, or ID changes in [employer]/MyHealth will be done by our Access Services Department.   If you need help with your account or password, call: [phone]. Clinic staff no longer has the ability to change passwords.

## 2017-06-07 NOTE — MR AVS SNAPSHOT
After Visit Summary   6/7/2017    Carolina Baugh    MRN: [id number]           Patient Information     Date Of Birth          1960        Visit Information        Provider Department      6/7/2017 4:20 PM Valeria Ralph MD [facility]        Today's Diagnoses     Mastodynia    -  1      Care Instructions    At [facility], we strive to deliver an exceptional experience to you, every time we see you.    If you receive a survey in the mail, please send us back your thoughts. We really do value your feedback.    Thank you for visiting [facility]    Normal or non-critical lab and imaging results will be communicated to you by MyChart, letter or phone within 7 days.  If you do not hear from us within 10 days, please call the clinic. If you have a critical or abnormal lab result, we will notify you by phone as soon as possible.     If you have any questions regarding your visit please contact:     Team Cort[facility]/Spirit  Clinic Hours Telephone Number   Dr. Clay Grace   7am-7pm  Monday through Thursday  7am-5pm Friday [phone]  Angela VENTURA RN   Pharmacy 8:30am-9pm Monday-Friday    9am-5pm Saturday-Sunday [phone]   Urgent Care 11am-9pm Monday-Friday        9am-5pm Saturday-Sunday [phone]     After hours, weekend or if you need to make an appointment with your primary provider please call [phone].   After Hours nurse advise: call [place] [facility]: [phone]    Use [facility] (secure email communication and access to your chart) to send your primary care provider a message or make an appointment. Ask someone on your Team how to sign up for [employer]. To log on to [employer] or for more information in [place] please visit the website at [email or web address].   As of October 8, 2013, all password changes, disabled  accounts, or ID changes in [email or web address] will be done by our Access Services Department.   If you need help with your account or password, call: [phone]. Clinic staff no longer has the ability to change passwords.             Follow-ups after your visit        Additional Services     BREAST CENTER REFERRAL       Your provider has referred you to: FMG: [facility] [phone]   [email or web address]  UMP: [facility] [phone]   [email or web address]    Please be aware that coverage of these services is subject to the terms and limitations of your health insurance plan.  Call member services at your health plan with any benefit or coverage questions.      Please bring the following with you to your appointment:    (1) Any X-Rays, CTs or MRIs which have been performed.  Contact the facility where they were done to arrange for  prior to your scheduled appointment.    (2) List of current medications   (3) This referral request   (4) Any documents/labs given to you for this referral                  Follow-up notes from your care team     Return if symptoms worsen or fail to improve.      Your next 10 appointments already scheduled     Jun 12, 2017  8:25 AM CDT   (Arrive by 8:10 AM)   Return Visit with Teto Mesa MD   [facility] ([facility])    [address] [place] [address]  4th Floor  [facility] [id number] [phone]            Jun 14, 2017 11:20 AM CDT   (Arrive by 11:05 AM)   New Patient Visit with Suzi Love MD   [facility] ([facility])    [address] [place] [address]  2nd Floor  [facility] [id number] [phone]            Aug 21, 2017  2:30 PM CDT   Return Visit with Isidro Goddard MD   [facility] ([facility]  "[facility]    [address] [address] [place] [id number] [phone]              Future tests that were ordered for you today     Open Future Orders        Priority Expected Expires Ordered    US Breast Bilateral Complete 4 Quadrants Routine  2018    MA Diagnostic Digital Bilateral Routine  2018            Who to contact     If you have questions or need follow up information about today's clinic visit or your schedule please contact [facility] KAVIN Mayo directly at [phone].  Normal or non-critical lab and imaging results will be communicated to you by "[employer]hart, letter or phone within 4 business days after the clinic has received the results. If you do not hear from us within 7 days, please contact the clinic through "[employer]hart or phone. If you have a critical or abnormal lab result, we will notify you by phone as soon as possible.  Submit refill requests through "[employer] or call your pharmacy and they will forward the refill request to us. Please allow 3 business days for your refill to be completed.          Additional Information About Your Visit        "[employer]harRezolve Information     "[employer] lets you send messages to your doctor, view your test results, renew your prescriptions, schedule appointments and more. To sign up, go to [email or web address] . Click on \"Log in\" on the left side of the screen, which will take you to the Welcome page. Then click on \"Sign up Now\" on the right side of the page.     You will be asked to enter the access code listed below, as well as some personal information. Please follow the directions to create your username and password.     Your access code is: [email or web address]  Expires: 2017  6:30 AM     Your access code will  in 90 days. If you need help or a new code, please call your [facility] or [phone].        Care EveryWhere ID     This is your Care EveryWhere ID. This could be used by other organizations to access " "your [place] medical records  [id number]        Your Vitals Were     Pulse Temperature Height Last Period Pulse Oximetry Breastfeeding?    72 98.5  F (36.9  C) (Oral) 5' 6.5\" (1.689 m) 01/13/2012 98% No    BMI (Body Mass Index)                   27.03 kg/m2            Blood Pressure from Last 3 Encounters:   06/07/17 121/80   03/30/17 113/78   01/09/17 128/83    Weight from Last 3 Encounters:   06/07/17 170 lb (77.1 kg)   03/30/17 168 lb 12.8 oz (76.6 kg)   01/09/17 169 lb (76.7 kg)              We Performed the Following     BREAST CENTER REFERRAL        Primary Care Provider Office Phone # Fax #    Teto Mesa [phone]        PHYSICIANS [address] [facility] [address]  [facility] [address]        Thank you!     Thank you for choosing [facility]  for your care. Our goal is always to provide you with excellent care. Hearing back from our patients is one way we can continue to improve our services. Please take a few minutes to complete the written survey that you may receive in the mail after your visit with us. Thank you!             Your Updated Medication List - Protect others around you: Learn how to safely use, store and throw away your medicines at www.disposemymeds.org.          This list is accurate as of: 6/7/17  5:42 PM.  Always use your most recent med list.                   Brand Name Dispense Instructions for use    albuterol 108 (90 BASE) MCG/ACT Inhaler    PROAIR HFA/PROVENTIL HFA/VENTOLIN HFA    1 Inhaler    Inhale 2 puffs into the lungs every 4 hours as needed for shortness of breath / dyspnea       amoxicillin-clavulanate 875-125 MG per tablet    AUGMENTIN    20 tablet    Take 1 tablet by mouth 2 times daily       cyclobenzaprine 10 MG tablet    FLEXERIL    14 tablet    Take 1 tablet (10 mg) by mouth once as needed for muscle spasms (at bedtime)       fexofenadine 180 MG tablet    ALLEGRA     1 tablet daily. for allergy symptoms.       FISH OIL PO      " Take 1 tablet by mouth daily as needed.       fluticasone 50 MCG/ACT spray    FLONASE    16 g    Spray 2 sprays into both nostrils daily       fluticasone-salmeterol 250-50 MCG/DOSE diskus inhaler    ADVAIR    1 Inhaler    Inhale 1 puff into the lungs every 12 hours       hydroquinone 4 % Crea     60 g    Apply twice a day to affected area of the face for 4 months max and then take a 1 month break.       levothyroxine 75 MCG tablet    SYNTHROID/LEVOTHROID    90 tablet    Take 1 tablet (75 mcg) by mouth daily       MULTIVITAMIN PO      Take 1 tablet by mouth daily.       ranitidine 300 MG tablet    ZANTAC    90 tablet    Take 1 tablet (300 mg) by mouth At Bedtime       REFRESH OP      Apply to eye as needed       tranexamic acid 650 MG tablet    LYSTEDA    15 tablet    Please use a pill cutter and cut pill in half. Then take half pill twice a day.
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
## 2017-06-09 PROBLEM — S49.91XA INJURY OF RIGHT SHOULDER: Status: RESOLVED | Noted: 2017-01-13 | Resolved: 2017-06-09

## 2017-06-09 PROBLEM — M54.2 CERVICAL PAIN: Status: RESOLVED | Noted: 2017-01-13 | Resolved: 2017-06-09

## 2017-06-09 NOTE — PROGRESS NOTES
Patient did not complete the planned course of treatment so current status is unknown.  Please see 1/16/2017 flowsheet for discharge status.  Discharge from physical therapy at this time.

## 2017-06-12 ENCOUNTER — RADIANT APPOINTMENT (OUTPATIENT)
Dept: MAMMOGRAPHY | Facility: CLINIC | Age: 57
End: 2017-06-12

## 2017-06-12 DIAGNOSIS — N64.4 BREAST PAIN: ICD-10-CM

## 2017-06-12 DIAGNOSIS — N64.4 MASTODYNIA: ICD-10-CM

## 2017-06-12 NOTE — LETTER
[facility] BRE[facility] IMAGING  [address]  [address] [facility] [address]             June 13, 2017    Carolina Baugh  [address] [facility] [address]            Dear Carolina Baugh,     Ultrasound and mammogram did not show any concerning changes. Please follow up at the Breast Center as discussed in clinic. Please let me know if you have any questions and thank you for choosing [facility]. Enclosed are the results.  Results for orders placed or performed in visit on 06/12/17   MA Diagnostic Bilateral w/Sergey    Narrative    Exam: Bilateral digital diagnostic mammography with CAD, tomosynthesis  and bilateral breast ultrasound      COMPARISON: 11/14/2016, 10/23/2014, 10/22/2013    HISTORY: Bilateral anterior breast pain    FINDINGS:  BREAST DENSITY: Extremely dense.     No significant mammographic finding including both anterior breasts  were the patient has symptomatic.    Bilateral focused breast ultrasound by radiologist and technologist of  the symptomatic areas demonstrates only normal-appearing underlying  breast tissues.       Impression    IMPRESSION: BI-RADS CATEGORY: 1 -  NEGATIVE.    RECOMMENDED: Annual Mammography    Clinical follow-up. Given lack of imaging findings, management of  symptoms would need to be based on clinical grounds.    The results were given to the patient.    SAHRA ZIEGLER MD       Regards,     Valeria Ralph MD MPH/kxm

## 2017-06-13 NOTE — PROGRESS NOTES
Letter sent to patients home address with results.  Feliciano Chowdary,  For [facility]

## 2017-06-13 NOTE — PROGRESS NOTES
Please send a letter:    Dear Carolina Baugh,    Ultrasound and mammogram did not show any concerning changes. Please follow up at the Breast Center as discussed in clinic. Please let me know if you have any questions and thank you for choosing [facility].    Regards,    Valeria Ralph MD MPH

## 2017-08-15 ENCOUNTER — OFFICE VISIT (OUTPATIENT)
Dept: FAMILY MEDICINE | Facility: CLINIC | Age: 57
End: 2017-08-15
Payer: COMMERCIAL

## 2017-08-15 VITALS
TEMPERATURE: 98.5 F | SYSTOLIC BLOOD PRESSURE: 127 MMHG | WEIGHT: 170.8 LBS | DIASTOLIC BLOOD PRESSURE: 84 MMHG | HEART RATE: 73 BPM | BODY MASS INDEX: 27.15 KG/M2 | OXYGEN SATURATION: 100 %

## 2017-08-15 DIAGNOSIS — Z01.84 IMMUNITY STATUS TESTING: Primary | ICD-10-CM

## 2017-08-15 DIAGNOSIS — J45.30 MILD PERSISTENT ASTHMA WITHOUT COMPLICATION: ICD-10-CM

## 2017-08-15 DIAGNOSIS — Z11.1 SCREENING EXAMINATION FOR PULMONARY TUBERCULOSIS: ICD-10-CM

## 2017-08-15 PROCEDURE — 86480 TB TEST CELL IMMUN MEASURE: CPT | Performed by: PREVENTIVE MEDICINE

## 2017-08-15 PROCEDURE — 99213 OFFICE O/P EST LOW 20 MIN: CPT | Performed by: PREVENTIVE MEDICINE

## 2017-08-15 PROCEDURE — 36415 COLL VENOUS BLD VENIPUNCTURE: CPT | Performed by: PREVENTIVE MEDICINE

## 2017-08-15 PROCEDURE — 86706 HEP B SURFACE ANTIBODY: CPT | Performed by: PREVENTIVE MEDICINE

## 2017-08-15 NOTE — PATIENT INSTRUCTIONS
Based on your medical history and these are the current health maintenance or preventive care services that you are due for (some may have been done at this visit)  Health Maintenance Due   Topic Date Due     TETANUS IMMUNIZATION (SYSTEM ASSIGNED)  05/30/2017     ASTHMA CONTROL TEST Q6 MOS  07/09/2017         At [facility], we strive to deliver an exceptional experience to you, every time we see you.    If you receive a survey in the mail, please send us back your thoughts. We really do value your feedback.    Your care team's suggested websites for health information:  Www.[email or web address].[email or web address] : Up to date and easily searchable information on multiple topics.  Www.medlineplus.gov : medication info, interactive tutorials, watch real surgeries online  Www.familydoctor.org : good info from the Academy of Family Physicians  Www.cdc.gov : public health info, travel advisories, epidemics (H1N1)  Www.aap.org : children's health info, normal development, vaccinations  Www.health.[facility].us : [place] dept of health, public health issues in [place], N1N1    How to contact your care team:   Rodrigo Barr/[place] [phone]         Pharmacy [phone]    Dr. Williamson, Natalie Hernandez PA-C, Dr. Ralph, Becky NINO CNP, Renu Ferrer PA-C, Dr. Whyte, and TRUNG Armendariz CNP    Team RNs: Mayuri & Milagros      Clinic hours  M-Th 7 am-7 pm   Fri 7 am-5 pm.   Urgent care M-F 11 am-9 pm,   Sat/Sun 9 am-5 pm.  Pharmacy M-Th 8 am-8 pm Fri 8 am-6 pm  Sat/Sun 9 am-5 pm.     All password changes, disabled accounts, or ID changes in [facility]/MyHealth will be done by our Access Services Department.    If you need help with your account or password, call: [phone]. Clinic staff no longer has the ability to change passwords.

## 2017-08-15 NOTE — NURSING NOTE
"Chief Complaint   Patient presents with     Imm/Inj     Hep B , Mantoux     Forms     Mental Satus form       Initial /84 (BP Location: Left arm, Patient Position: Sitting, Cuff Size: Adult Regular)  Pulse 73  Temp 98.5  F (36.9  C) (Tympanic)  Wt 170 lb 12.8 oz (77.5 kg)  LMP 01/13/2012  SpO2 100%  Breastfeeding? Unknown  BMI 27.15 kg/m2 Estimated body mass index is 27.15 kg/(m^2) as calculated from the following:    Height as of 6/7/17: 5' 6.5\" (1.689 m).    Weight as of this encounter: 170 lb 12.8 oz (77.5 kg).  Medication Reconciliation: complete   Cezar Monsivais MA    "

## 2017-08-15 NOTE — MR AVS SNAPSHOT
After Visit Summary   8/15/2017    Carolina Baugh    MRN: [id number]           Patient Information     Date Of Birth          1960        Visit Information        Provider Department      8/15/2017 4:20 PM Valeria Ralph MD [facility]        Today's Diagnoses     Immunity status testing    -  1    Mild persistent asthma without complication        Screening examination for pulmonary tuberculosis          Care Instructions    Based on your medical history and these are the current health maintenance or preventive care services that you are due for (some may have been done at this visit)  Health Maintenance Due   Topic Date Due     TETANUS IMMUNIZATION (SYSTEM ASSIGNED)  05/30/2017     ASTHMA CONTROL TEST Q6 MOS  07/09/2017         At [facility], we strive to deliver an exceptional experience to you, every time we see you.    If you receive a survey in the mail, please send us back your thoughts. We really do value your feedback.    Your care team's suggested websites for health information:  Www.[employer] : Up to date and easily searchable information on multiple topics.  Www.medlineplus.gov : medication info, interactive tutorials, watch real surgeries online  Www.familydoctor.org : good info from the Academy of Family Physicians  Www.cdc.gov : public health info, travel advisories, epidemics (H1N1)  Www.aap.org : children's health info, normal development, vaccinations  Www.health.[facility].[place].us : [place] dept of health, public health issues in [place], N1N1    How to contact your care team:   Team [place]/[facility] [phone]         Pharmacy [phone]    Dr. Williamson, Natalie Hernandez PA-C, Dr. Ralph, Becky Grace APRN CNP, Renu Ferrer PA-C, Dr. Whyte, and TRUNG Armendariz CNP    Team RNs: Mayuri & Milagros      Clinic hours  M-Th 7 am-7 pm   Fri 7 am-5 pm.   Urgent care M-F 11 am-9 pm,   Sat/Sun 9 am-5 pm.  Pharmacy M-Th 8 am-8 pm Fri 8 am-6  "pm  Sat/Sun 9 am-5 pm.     All password changes, disabled accounts, or ID changes in [employer]/MyHealth will be done by our Access Services Department.    If you need help with your account or password, call: [phone]. Clinic staff no longer has the ability to change passwords.             Follow-ups after your visit        Follow-up notes from your care team     Return if symptoms worsen or fail to improve.      Your next 10 appointments already scheduled     Aug 21, 2017  2:30 PM CDT   [facility]    [address] [address] [place] [id number] [phone]              Who to contact     If you have questions or need follow up information about today's clinic visit or your schedule please contact [facility] directly at [phone].  Normal or non-critical lab and imaging results will be communicated to you by [employer], letter or phone within 4 business days after the clinic has received the results. If you do not hear from us within 7 days, please contact the clinic through [employer] or phone. If you have a critical or abnormal lab result, we will notify you by phone as soon as possible.  Submit refill requests through [employer] or call your pharmacy and they will forward the refill request to us. Please allow 3 business days for your refill to be completed.          Additional Information About Your Visit        UrbanSitter Information     [employer]itter lets you send messages to your doctor, view your test results, renew your prescriptions, schedule appointments and more. To sign up, go to [email or web address] . Click on \"Log in\" on the left side of the screen, which will take you to the Welcome page. Then click on \"Sign up Now\" on the right side of the page.     You will be asked to enter the access code listed below, as well as some personal information. Please follow the directions to create your " username and password.     Your access code is: [email or web address]  Expires: 2017  6:30 AM     Your access code will  in 90 days. If you need help or a new code, please call your [facility] or [phone].        Care EveryWhere ID     This is your Care EveryWhere ID. This could be used by other organizations to access your [place] medical records  [id number]        Your Vitals Were     Pulse Temperature Last Period Pulse Oximetry Breastfeeding? BMI (Body Mass Index)    73 98.5  F (36.9  C) (Tympanic) 2012 100% Unknown 27.15 kg/m2       Blood Pressure from Last 3 Encounters:   08/15/17 127/84   17 121/80   17 113/78    Weight from Last 3 Encounters:   08/15/17 170 lb 12.8 oz (77.5 kg)   17 170 lb (77.1 kg)   17 168 lb 12.8 oz (76.6 kg)              We Performed the Following     Hepatitis B Surface Antibody     M Tuberculosis by Quantiferon        Primary Care Provider Office Phone # Fax #    [place] [phone]       [address] [facility] [address]        Equal Access to Services     SATYA CABALLERO : Hadii aad ku hadasho Soomaali, waaxda luqadaha, qaybta kaalmada adeegyada, waxay idiin hayaurelio jamison . So [facility] [phone].    ATENCIÓN: Si habla español, tiene a vincent disposición servicios gratuitos de asistencia lingüística. nicolás al [phone].    We comply with applicable federal civil rights laws and [place] laws. We do not discriminate on the basis of race, color, national origin, age, disability sex, sexual orientation or gender identity.            Thank you!     Thank you for choosing [facility]  for your care. Our goal is always to provide you with excellent care. Hearing back from our patients is one way we can continue to improve our services. Please take a few minutes to complete the written survey that you may receive in the mail after your visit with us. Thank you!             Your Updated  Medication List - Protect others around you: Learn how to safely use, store and throw away your medicines at www.disposemymeds.org.          This list is accurate as of: 8/15/17  5:20 PM.  Always use your most recent med list.                   Brand Name Dispense Instructions for use Diagnosis    albuterol 108 (90 BASE) MCG/ACT Inhaler    PROAIR HFA/PROVENTIL HFA/VENTOLIN HFA    1 Inhaler    Inhale 2 puffs into the lungs every 4 hours as needed for shortness of breath / dyspnea    Mild persistent asthma with acute exacerbation       amoxicillin-clavulanate 875-125 MG per tablet    AUGMENTIN    20 tablet    Take 1 tablet by mouth 2 times daily    Acute sinusitis with symptoms > 10 days       cyclobenzaprine 10 MG tablet    FLEXERIL    14 tablet    Take 1 tablet (10 mg) by mouth once as needed for muscle spasms (at bedtime)    Shoulder injury, right, initial encounter       fexofenadine 180 MG tablet    ALLEGRA     1 tablet daily. for allergy symptoms.        FISH OIL PO      Take 1 tablet by mouth daily as needed.        fluticasone 50 MCG/ACT spray    FLONASE    16 g    Spray 2 sprays into both nostrils daily    Nasal congestion, Chronic rhinitis       fluticasone-salmeterol 250-50 MCG/DOSE diskus inhaler    ADVAIR    1 Inhaler    Inhale 1 puff into the lungs every 12 hours    Mild persistent asthma without complication       hydroquinone 4 % Crea     60 g    Apply twice a day to affected area of the face for 4 months max and then take a 1 month break.    Melasma, Post-inflammatory hyperpigmentation       levothyroxine 75 MCG tablet    SYNTHROID/LEVOTHROID    90 tablet    Take 1 tablet (75 mcg) by mouth daily    Hypothyroidism due to acquired atrophy of thyroid       MULTIVITAMIN PO      Take 1 tablet by mouth daily.        ranitidine 300 MG tablet    ZANTAC    90 tablet    Take 1 tablet (300 mg) by mouth At Bedtime    Gastroesophageal reflux disease without esophagitis       REFRESH OP      Apply to eye as needed         tranexamic acid 650 MG tablet    LYSTEDA    15 tablet    Please use a pill cutter and cut pill in half. Then take half pill twice a day.    Melasma

## 2017-08-15 NOTE — LETTER
August 21, 2017      Carolina Baugh  [address] [address] [facility] [address]            Dear Carolina Baugh,    Results are ready for .  Blood test for Tuberculosis was negative.  Blood test shows that you are not immune to Hepatitis B and vaccination is recommended.     Regards,    Valeria Ralph MD MPH/ak        Results for orders placed or performed in visit on 08/15/17   Hepatitis B Surface Antibody   Result Value Ref Range    Hepatitis B Surface Antibody 0.17 <8.00 m[IU]/mL   M Tuberculosis by Quantiferon   Result Value Ref Range    M Tuberculosis Result Negative NEG^Negative    M Tuberculosis Antigen Value 0.03 IU/mL

## 2017-08-15 NOTE — PROGRESS NOTES
SUBJECTIVE:                                                    Carolina Baugh is a 57 year old female who presents to clinic today for the following health issues:      Here for labs needed for work.   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  Chest X Ray in 11/16 was normal.   No unexplained hoarseness  No loss of appetite  No unexplained weight loss  No productive or prolonged cough  No bloody sputum  Np persistent fever over 100 F  No night sweats  No hemoptysis  No shortness of breath  No unexplained fatigue or weakness  No chest pain  No recurrent pneumonia  No unprotected exposure to a known TB patient      Asthma Follow-Up    Was ACT completed today?    Yes    ACT Total Scores 8/15/2017   ACT TOTAL SCORE -   ASTHMA ER VISITS -   ASTHMA HOSPITALIZATIONS -   ACT TOTAL SCORE (Goal Greater than or Equal to 20) 25   In the past 12 months, how many times did you visit the emergency room for your asthma without being admitted to the hospital? 0   In the past 12 months, how many times were you hospitalized overnight because of your asthma? 0       Recent asthma triggers that patient is dealing with: None      Problem list and histories reviewed & adjusted, as indicated.  Additional history: as documented    Patient Active Problem List   Diagnosis     Mild persistent asthma     Encounter for screening colonoscopy     CARDIOVASCULAR SCREENING; LDL GOAL LESS THAN 160     Sinusitis, chronic     Post-menopausal     Hypothyroidism     Mantoux: positive     Advance care planning     Past Surgical History:   Procedure Laterality Date     BIOPSY BREAST Right      ENT SURGERY         Social History   Substance Use Topics     Smoking status: Never Smoker     Smokeless tobacco: Never Used      Comment: exposure to second hand smoke     Alcohol use No     Family History   Problem Relation Age of Onset     Asthma  Mother      Thyroid Disease Sister      Respiratory Sister      sinus, 2 sisters     CANCER No family hx of      DIABETES No family hx of      Hypertension No family hx of      CEREBROVASCULAR DISEASE No family hx of      Glaucoma No family hx of      Macular Degeneration No family hx of          Current Outpatient Prescriptions   Medication Sig Dispense Refill     levothyroxine (SYNTHROID/LEVOTHROID) 75 MCG tablet Take 1 tablet (75 mcg) by mouth daily 90 tablet 3     ranitidine (ZANTAC) 300 MG tablet Take 1 tablet (300 mg) by mouth At Bedtime 90 tablet 3     fluticasone (FLONASE) 50 MCG/ACT spray Spray 2 sprays into both nostrils daily 16 g 11     fluticasone-salmeterol (ADVAIR) 250-50 MCG/DOSE diskus inhaler Inhale 1 puff into the lungs every 12 hours 1 Inhaler 12     albuterol (PROAIR HFA/PROVENTIL HFA/VENTOLIN HFA) 108 (90 BASE) MCG/ACT Inhaler Inhale 2 puffs into the lungs every 4 hours as needed for shortness of breath / dyspnea 1 Inhaler 2     Polyvinyl Alcohol-Povidone (REFRESH OP) Apply to eye as needed       Omega-3 Fatty Acids (FISH OIL PO) Take 1 tablet by mouth daily as needed.       fexofenadine (ALLEGRA) 180 MG tablet 1 tablet daily. for allergy symptoms.       MULTIVITAMIN OR Take 1 tablet by mouth daily.       tranexamic acid (LYSTEDA) 650 MG tablet Please use a pill cutter and cut pill in half. Then take half pill twice a day. (Patient not taking: Reported on 8/15/2017) 15 tablet 2     amoxicillin-clavulanate (AUGMENTIN) 875-125 MG per tablet Take 1 tablet by mouth 2 times daily (Patient not taking: Reported on 8/15/2017) 20 tablet 0     hydroquinone 4 % CREA Apply twice a day to affected area of the face for 4 months max and then take a 1 month break. (Patient not taking: Reported on 8/15/2017) 60 g 3     cyclobenzaprine (FLEXERIL) 10 MG tablet Take 1 tablet (10 mg) by mouth once as needed for muscle spasms (at bedtime) (Patient not taking: Reported on 8/15/2017) 14 tablet 0     No Known  Allergies  BP Readings from Last 3 Encounters:   08/15/17 127/84   06/07/17 121/80   03/30/17 113/78    Wt Readings from Last 3 Encounters:   08/15/17 170 lb 12.8 oz (77.5 kg)   06/07/17 170 lb (77.1 kg)   03/30/17 168 lb 12.8 oz (76.6 kg)            Reviewed and updated as needed this visit by clinical staffTobacco  Allergies  Meds       Reviewed and updated as needed this visit by Provider         ROS:  Constitutional, neuro, ENT, endocrine, pulmonary, cardiac, gastrointestinal, genitourinary, musculoskeletal, integument and psychiatric systems are negative, except as otherwise noted.      OBJECTIVE:                                                    /84 (BP Location: Left arm, Patient Position: Sitting, Cuff Size: Adult Regular)  Pulse 73  Temp 98.5  F (36.9  C) (Tympanic)  Wt 170 lb 12.8 oz (77.5 kg)  LMP 01/13/2012  SpO2 100%  Breastfeeding? Unknown  BMI 27.15 kg/m2  Body mass index is 27.15 kg/(m^2).  GENERAL APPEARANCE: healthy, alert and no distress  EYES: Eyes grossly normal to inspection and conjunctivae and sclerae normal  NECK: no adenopathy and no asymmetry, masses, or scars  RESP: lungs clear to auscultation - no rales, rhonchi or wheezes  CV: regular rates and rhythm, normal S1 S2, no S3 or S4 and no murmur, click or rub  ABDOMEN: soft, non-tender  MS: extremities normal- no gross deformities noted  NEURO: Normal strength and tone, mentation intact and speech normal  PSYCH: mentation appears normal and affect normal/bright    Diagnostic test results:  Diagnostic Test Results:  No results found for this or any previous visit (from the past 24 hour(s)).       ASSESSMENT/PLAN:                                                    1. Immunity status testing  -Await lab results   - Hepatitis B Surface Antibody    2. Mild persistent asthma without complication  -Stable  -ACT score of 25     3. Screening examination for pulmonary tuberculosis  -Await labs  -Chest X ray negative 11/16, copy of  results given to patient   - M Tuberculosis by Quantiferon      Follow up with Provider - Patient would like a call so she can  results when available      Valeria Ralph MD MPH    [facility]

## 2017-08-16 LAB — HBV SURFACE AB SERPL IA-ACNC: 0.17 M[IU]/ML

## 2017-08-16 ASSESSMENT — ASTHMA QUESTIONNAIRES: ACT_TOTALSCORE: 25

## 2017-08-17 LAB
M TB TUBERC IFN-G BLD QL: NEGATIVE
M TB TUBERC IFN-G/MITOGEN IGNF BLD: 0.03 IU/ML

## 2017-08-17 NOTE — PROGRESS NOTES
Please CALL patient:    Dear Carolina Baugh,    Results are ready for .  Blood test for Tuberculosis was negative.  Blood test shows that you are not immune to Hepatitis B and vaccination is recommended.     Regards,    Valeria Ralph MD MPH

## 2017-08-24 ENCOUNTER — ALLIED HEALTH/NURSE VISIT (OUTPATIENT)
Dept: NURSING | Facility: CLINIC | Age: 57
End: 2017-08-24
Payer: COMMERCIAL

## 2017-08-24 DIAGNOSIS — Z23 NEED FOR PROPHYLACTIC VACCINATION AND INOCULATION AGAINST VIRAL HEPATITIS: Primary | ICD-10-CM

## 2017-08-24 PROCEDURE — 99207 ZZC NO CHARGE LOS: CPT

## 2017-08-24 PROCEDURE — 90746 HEPB VACCINE 3 DOSE ADULT IM: CPT

## 2017-08-24 PROCEDURE — 90471 IMMUNIZATION ADMIN: CPT

## 2017-08-24 NOTE — PROGRESS NOTES
Screening Questionnaire for Adult Immunization    Are you sick today?   No   Do you have allergies to medications, food, a vaccine component or latex?   No   Have you ever had a serious reaction after receiving a vaccination?   No   Do you have a long-term health problem with heart disease, lung disease, asthma, kidney disease, metabolic disease (e.g. diabetes), anemia, or other blood disorder?   No   Do you have cancer, leukemia, HIV/AIDS, or any other immune system problem?   No   In the past 3 months, have you taken medications that affect  your immune system, such as prednisone, other steroids, or anticancer drugs; drugs for the treatment of rheumatoid arthritis, Crohn s disease, or psoriasis; or have you had radiation treatments?   No   Have you had a seizure, or a brain or other nervous system problem?   No   During the past year, have you received a transfusion of blood or blood     products, or been given immune (gamma) globulin or antiviral drug?   No   For women: Are you pregnant or is there a chance you could become        pregnant during the next month?   No   Have you received any vaccinations in the past 4 weeks?   No     Immunization questionnaire answers were all negative.        Per orders of Dr.Branch Emanuel , injection of Hep B #1 given by Garima Saavedra. Patient instructed to remain in clinic for 15 minutes afterwards, and to report any adverse reaction to me immediately.       Screening performed by Garima Saavedra on 8/24/2017 at 4:06 PM.

## 2017-08-24 NOTE — MR AVS SNAPSHOT
"              After Visit Summary   8/24/2017    Carolina Baugh    MRN: [id number]           Patient Information     Date Of Birth          1960        Visit Information        Provider Department      8/24/2017 3:40 PM BK ANCILLARY [facility]        Today's Diagnoses     Need for prophylactic vaccination and inoculation against viral hepatitis    -  1       Follow-ups after your visit        Your next 10 appointments already scheduled     Sep 25, 2017  3:15 PM CDT   Return Visit with MAGALY Tolliver MD   [facility]    [address] [place] [id number] [phone]            Sep 25, 2017  4:40 PM CDT   Nurse Only with BK ANCILLARY   [facility]    [address] [facility] [address] [phone]              Who to contact     If you have questions or need follow up information about today's clinic visit or your schedule please contact [facility] directly at [phone].  Normal or non-critical lab and imaging results will be communicated to you by Srd Industrieshart, letter or phone within 4 business days after the clinic has received the results. If you do not hear from us within 7 days, please contact the clinic through [employer] or phone. If you have a critical or abnormal lab result, we will notify you by phone as soon as possible.  Submit refill requests through Munax or call your pharmacy and they will forward the refill request to us. Please allow 3 business days for your refill to be completed.          Additional Information About Your Visit        MyChart Information     Munax lets you send messages to your doctor, view your test results, renew your prescriptions, schedule appointments and more. To sign up, go to [email or web address] . Click on \"Log in\" on the left side of the screen, which will take you to the Welcome page. Then " "click on \"Sign up Now\" on the right side of the page.     You will be asked to enter the access code listed below, as well as some personal information. Please follow the directions to create your username and password.     Your access code is: [email or web address]  Expires: 2017  6:30 AM     Your access code will  in 90 days. If you need help or a new code, please call your [facility] clinic or [phone].        Care EveryWhere ID     This is your Care EveryWhere ID. This could be used by other organizations to access your [facility] medical records  [id number]        Your Vitals Were     Last Period                   2012            Blood Pressure from Last 3 Encounters:   08/15/17 127/84   17 121/80   17 113/78    Weight from Last 3 Encounters:   08/15/17 170 lb 12.8 oz (77.5 kg)   17 170 lb (77.1 kg)   17 168 lb 12.8 oz (76.6 kg)              We Performed the Following     ADMIN 1st VACCINE     HEPATITIS B VACCINE, ADULT, IM        Primary Care Provider Office Phone # Fax #    [place] [phone]       [address] [facility] [address]        Equal Access to Services     [facility] : Hadii aad ku hadasho Soomaali, waaxda luqadaha, qaybta kaalmada adeegyada, waxay idiin hayjoaquínn nancy jamison . So [facility] [phone].    ATENCIÓN: Si habla español, tiene a vincent disposición servicios gratuitos de asistencia lingüística. ame al [phone].    We comply with applicable federal civil rights laws and [place] laws. We do not discriminate on the basis of race, color, national origin, age, disability sex, sexual orientation or gender identity.            Thank you!     Thank you for choosing [facility]  for your care. Our goal is always to provide you with excellent care. Hearing back from our patients is one way we can continue to improve our services. Please take a few minutes to complete the written survey that you may " receive in the mail after your visit with us. Thank you!             Your Updated Medication List - Protect others around you: Learn how to safely use, store and throw away your medicines at www.disposemymeds.org.          This list is accurate as of: 8/24/17  4:07 PM.  Always use your most recent med list.                   Brand Name Dispense Instructions for use Diagnosis    albuterol 108 (90 BASE) MCG/ACT Inhaler    PROAIR HFA/PROVENTIL HFA/VENTOLIN HFA    1 Inhaler    Inhale 2 puffs into the lungs every 4 hours as needed for shortness of breath / dyspnea    Mild persistent asthma with acute exacerbation       amoxicillin-clavulanate 875-125 MG per tablet    AUGMENTIN    20 tablet    Take 1 tablet by mouth 2 times daily    Acute sinusitis with symptoms > 10 days       cyclobenzaprine 10 MG tablet    FLEXERIL    14 tablet    Take 1 tablet (10 mg) by mouth once as needed for muscle spasms (at bedtime)    Shoulder injury, right, initial encounter       fexofenadine 180 MG tablet    ALLEGRA     1 tablet daily. for allergy symptoms.        FISH OIL PO      Take 1 tablet by mouth daily as needed.        fluticasone 50 MCG/ACT spray    FLONASE    16 g    Spray 2 sprays into both nostrils daily    Nasal congestion, Chronic rhinitis       fluticasone-salmeterol 250-50 MCG/DOSE diskus inhaler    ADVAIR    1 Inhaler    Inhale 1 puff into the lungs every 12 hours    Mild persistent asthma without complication       hydroquinone 4 % Crea     60 g    Apply twice a day to affected area of the face for 4 months max and then take a 1 month break.    Melasma, Post-inflammatory hyperpigmentation       levothyroxine 75 MCG tablet    SYNTHROID/LEVOTHROID    90 tablet    Take 1 tablet (75 mcg) by mouth daily    Hypothyroidism due to acquired atrophy of thyroid       MULTIVITAMIN PO      Take 1 tablet by mouth daily.        ranitidine 300 MG tablet    ZANTAC    90 tablet    Take 1 tablet (300 mg) by mouth At Bedtime     Gastroesophageal reflux disease without esophagitis       REFRESH OP      Apply to eye as needed        tranexamic acid 650 MG tablet    LYSTEDA    15 tablet    Please use a pill cutter and cut pill in half. Then take half pill twice a day.    Melasma

## 2017-09-25 ENCOUNTER — OFFICE VISIT (OUTPATIENT)
Dept: DERMATOLOGY | Facility: CLINIC | Age: 57
End: 2017-09-25
Payer: COMMERCIAL

## 2017-09-25 ENCOUNTER — ALLIED HEALTH/NURSE VISIT (OUTPATIENT)
Dept: NURSING | Facility: CLINIC | Age: 57
End: 2017-09-25
Payer: COMMERCIAL

## 2017-09-25 DIAGNOSIS — Z23 NEED FOR HEPATITIS B VACCINATION: ICD-10-CM

## 2017-09-25 DIAGNOSIS — L81.1 MELASMA: Primary | ICD-10-CM

## 2017-09-25 DIAGNOSIS — Z23 NEED FOR PROPHYLACTIC VACCINATION AND INOCULATION AGAINST INFLUENZA: Primary | ICD-10-CM

## 2017-09-25 DIAGNOSIS — Z23 NEED FOR PROPHYLACTIC VACCINATION WITH TETANUS-DIPHTHERIA (TD): ICD-10-CM

## 2017-09-25 PROCEDURE — 99213 OFFICE O/P EST LOW 20 MIN: CPT | Performed by: DERMATOLOGY

## 2017-09-25 PROCEDURE — 90746 HEPB VACCINE 3 DOSE ADULT IM: CPT

## 2017-09-25 PROCEDURE — 99207 ZZC NO CHARGE NURSE ONLY: CPT

## 2017-09-25 PROCEDURE — 90471 IMMUNIZATION ADMIN: CPT

## 2017-09-25 PROCEDURE — 90472 IMMUNIZATION ADMIN EACH ADD: CPT

## 2017-09-25 PROCEDURE — 90714 TD VACC NO PRESV 7 YRS+ IM: CPT

## 2017-09-25 PROCEDURE — 90686 IIV4 VACC NO PRSV 0.5 ML IM: CPT

## 2017-09-25 NOTE — LETTER
9/25/2017      RE: Carolina Baugh  [address] [facility] [address]       Dermatology Rooming Note    Carolina Baugh's goals for this visit include:   Chief Complaint   Patient presents with     Derm Problem     melasma       Is a scribe okay for this visit:Not applicable    Are records needed for this visit(If yes, obtain release of information): No     Vitals: LMP 01/13/2012    Referring Provider:  Isidro Goddard MD  [address] [facility] [address]  [place] [address]    Madina Lerner LPN          SUBJECTIVE:  Return visit for this pleasant woman, Carolina Baugh, who has been troubled with melasma for many years.  She is currently using hydroquinone %.  She did not  the tranexamic acid pills fearing the side effects of this medication.  She comes in for suggestions as to what she should do about this persistent melasma.      OBJECTIVE:  Shows a healthy lady in no distress.  On her cheeks is mild melasma when  compared to other women with this dermatosis, but with definite features of this problem on both cheeks.    A: Melasma      PLAN:   1.  I suggest that she use 2 sunscreens, a sunblock with zinc oxide or titanium dioxide, as well as the sunscreen that Dr. Magdaleno recommended.   2.   That she use a Differin  Gel and combine that with 1% hydrocortisone cream and the 4% hydroquinone bleach.  We discussed ochronosis pigmentation as a possibility with using stronger bleaching agents of the hydroquinone family, but that she was unlikely to experience this side effect.    3.  I suggested  that she try to use coverup makeup as much as possible and I did advise that melasma is extremely difficult to treat and that some women do not respond very well until time passes and as they get older it usually slowly resolves.    4.  Return 2-3 months if seeing little progress of improvement.        Meds and allergies reviewed.         MAGALY MCGHEE MD             D: 09/25/2017 16:09   T:  2017 09:45   MT: TS      Name:     MISTI HANNA   MRN:      [id number]        Account:      [id number]   :      1960           Visit Date:   2017      Document: [id number]        H Rony Tolliver MD

## 2017-09-25 NOTE — MR AVS SNAPSHOT
After Visit Summary   9/25/2017    Carolina Baugh    MRN: [id number]           Patient Information     Date Of Birth          1960        Visit Information        Provider Department      9/25/2017 3:15 PM MAGALY Tolliver MD [facility]         Follow-ups after your visit        Your next 10 appointments already scheduled     Sep 25, 2017  4:40 PM CDT   Nurse Only with BK ANCILLARY   [facility]    [address] [facility] [address] [phone]            Jan 23, 2018  3:15 PM CST   Return Visit with Tasha Magdaleno MD   [facility] ([facility])    [address] [place] [address] [phone]              Who to contact     If you have questions or need follow up information about today's clinic visit or your schedule please contact [facility] directly at [phone].  Normal or non-critical lab and imaging results will be communicated to you by Allihubhart, letter or phone within 4 business days after the clinic has received the results. If you do not hear from us within 7 days, please contact the clinic through MyChart or phone. If you have a critical or abnormal lab result, we will notify you by phone as soon as possible.  Submit refill requests through [employer] or call your pharmacy and they will forward the refill request to us. Please allow 3 business days for your refill to be completed.          Additional Information About Your Visit        Allihubhart Information     [employer] is an electronic gateway that provides easy, online access to your medical records. With [employer], you can request a clinic appointment, read your test results, renew a prescription or communicate with your care team.     To sign up for [employer] visit the website at [email or web address]   You will be asked to enter the access code listed below, as well as some  personal information. Please follow the directions to create your username and password.     Your access code is: [email or web address]  Expires: 2017  3:46 PM     Your access code will  in 90 days. If you need help or a new code, please contact your [facility] or call [phone] for assistance.        Care EveryWhere ID     This is your Care EveryWhere ID. This could be used by other organizations to access your [place] medical records  [id number]        Your Vitals Were     Last Period                   2012            Blood Pressure from Last 3 Encounters:   08/15/17 127/84   17 121/80   17 113/78    Weight from Last 3 Encounters:   08/15/17 77.5 kg (170 lb 12.8 oz)   17 77.1 kg (170 lb)   17 76.6 kg (168 lb 12.8 oz)              Today, you had the following     No orders found for display       Primary Care Provider Office Phone # Fax #    [place] [phone]       [address] [facility] [address]        Equal Access to Services     [facility]: Hadii aad ku hadasho Soomaali, waaxda luqadaha, qaybta kaalmada adeegyada, waxay juanin hayjoaquínn nancy jamison . So [facility] [phone].    ATENCIÓN: Si habla español, tiene a vincent disposición servicios gratuitos de asistencia lingüística. Llame al [phone].    We comply with applicable federal civil rights laws and [place] laws. We do not discriminate on the basis of race, color, national origin, age, disability sex, sexual orientation or gender identity.            Thank you!     Thank you for choosing [facility]  for your care. Our goal is always to provide you with excellent care. Hearing back from our patients is one way we can continue to improve our services. Please take a few minutes to complete the written survey that you may receive in the mail after your visit with us. Thank you!             Your Updated Medication List - Protect  others around you: Learn how to safely use, store and throw away your medicines at www.disposemymeds.org.          This list is accurate as of: 9/25/17  3:46 PM.  Always use your most recent med list.                   Brand Name Dispense Instructions for use Diagnosis    albuterol 108 (90 BASE) MCG/ACT Inhaler    PROAIR HFA/PROVENTIL HFA/VENTOLIN HFA    1 Inhaler    Inhale 2 puffs into the lungs every 4 hours as needed for shortness of breath / dyspnea    Mild persistent asthma with acute exacerbation       amoxicillin-clavulanate 875-125 MG per tablet    AUGMENTIN    20 tablet    Take 1 tablet by mouth 2 times daily    Acute sinusitis with symptoms > 10 days       AZELAIC ACID EX           cyclobenzaprine 10 MG tablet    FLEXERIL    14 tablet    Take 1 tablet (10 mg) by mouth once as needed for muscle spasms (at bedtime)    Shoulder injury, right, initial encounter       DIFFERIN EX           fexofenadine 180 MG tablet    ALLEGRA     1 tablet daily. for allergy symptoms.        FISH OIL PO      Take 1 tablet by mouth daily as needed.        fluticasone 50 MCG/ACT spray    FLONASE    16 g    Spray 2 sprays into both nostrils daily    Nasal congestion, Chronic rhinitis       fluticasone-salmeterol 250-50 MCG/DOSE diskus inhaler    ADVAIR    1 Inhaler    Inhale 1 puff into the lungs every 12 hours    Mild persistent asthma without complication       hydroquinone 4 % Crea     60 g    Apply twice a day to affected area of the face for 4 months max and then take a 1 month break.    Melasma, Post-inflammatory hyperpigmentation       levothyroxine 75 MCG tablet    SYNTHROID/LEVOTHROID    90 tablet    Take 1 tablet (75 mcg) by mouth daily    Hypothyroidism due to acquired atrophy of thyroid       MULTIVITAMIN PO      Take 1 tablet by mouth daily.        ranitidine 300 MG tablet    ZANTAC    90 tablet    Take 1 tablet (300 mg) by mouth At Bedtime    Gastroesophageal reflux disease without esophagitis       REFRESH OP       Apply to eye as needed        tranexamic acid 650 MG tablet    LYSTEDA    15 tablet    Please use a pill cutter and cut pill in half. Then take half pill twice a day.    Melasma

## 2017-09-25 NOTE — PROGRESS NOTES
Dermatology Rooming Note    Carolina Baugh's goals for this visit include:   Chief Complaint   Patient presents with     Derm Problem     melasma       Is a scribe okay for this visit:Not applicable    Are records needed for this visit(If yes, obtain release of information): No     Vitals: LMP 01/13/2012    Referring Provider:  Isidro Goddard MD  [address] [place] [address] [address]  [place] [address]    Madina Lerner LPN

## 2017-09-25 NOTE — PROGRESS NOTES
Injectable Influenza Immunization Documentation    1.  Is the person to be vaccinated sick today?   No    2. Does the person to be vaccinated have an allergy to a component   of the vaccine?   No    3. Has the person to be vaccinated ever had a serious reaction   to influenza vaccine in the past?   No    4. Has the person to be vaccinated ever had Guillain-Barré syndrome?   No    Form completed by Salas Lechuga CMA

## 2017-09-25 NOTE — MR AVS SNAPSHOT
After Visit Summary   9/25/2017    Carolina Baugh    MRN: [id number]           Patient Information     Date Of Birth          1960        Visit Information        Provider Department      9/25/2017 4:40 PM BK ANCILLARY [facility]        Today's Diagnoses     Need for prophylactic vaccination and inoculation against influenza    -  1    Need for prophylactic vaccination with tetanus-diphtheria (TD)        Need for hepatitis B vaccination           Follow-ups after your visit        Your next 10 appointments already scheduled     Sep 25, 2017  4:40 PM CDT   Nurse Only with BK ANCILLARY   [facility] ([facility])    [address] [facility] [id number] [phone]            Jan 23, 2018  3:15 PM CST   [facility] with [facility]    [address] [place] [address] [phone]              Who to contact     If you have questions or need follow up information about today's clinic visit or your schedule please contact [facility] directly at [phone].  Normal or non-critical lab and imaging results will be communicated to you by Interactive Projecthart, letter or phone within 4 business days after the clinic has received the results. If you do not hear from us within 7 days, please contact the clinic through Interactive Projecthart or phone. If you have a critical or abnormal lab result, we will notify you by phone as soon as possible.  Submit refill requests through Altocom or call your pharmacy and they will forward the refill request to us. Please allow 3 business days for your refill to be completed.          Additional Information About Your Visit        MyChart Information     Altocom lets you send messages to your doctor, view your test results, renew your prescriptions, schedule appointments and more. To sign up, go to [email or web address]  ". Click on \"Log in\" on the left side of the screen, which will take you to the Welcome page. Then click on \"Sign up Now\" on the right side of the page.     You will be asked to enter the access code listed below, as well as some personal information. Please follow the directions to create your username and password.     Your access code is: [email or web address]  Expires: 2017  3:46 PM     Your access code will  in 90 days. If you need help or a new code, please call your [facility] clinic or [phone].        Care EveryWhere ID     This is your Care EveryWhere ID. This could be used by other organizations to access your [facility] medical records  [id number]        Your Vitals Were     Last Period                   2012            Blood Pressure from Last 3 Encounters:   08/15/17 127/84   17 121/80   17 113/78    Weight from Last 3 Encounters:   08/15/17 170 lb 12.8 oz (77.5 kg)   17 170 lb (77.1 kg)   17 168 lb 12.8 oz (76.6 kg)              We Performed the Following     FLU VAC, SPLIT VIRUS IM > 3 YO (QUADRIVALENT) [84365]     HEPATITIS B VACCINE, ADULT, IM     TD PRSERV FREE >=7 YRS ADS IM     VACCINE ADMINISTRATION, EACH ADDITIONAL     Vaccine Administration, Initial [[address]]        Primary Care Provider Office Phone # Fax #    [place] [phone]       [address] [facility] [address]        Equal Access to Services     [facility] : Hadii aad ku hadasho Sorajatali, waaxda luqadaha, qaybta kaalmada michelle sanchez . So [facility] [phone].    ATENCIÓN: Si habla español, tiene a vincent disposición servicios gratuitos de asistencia lingüística. Llame al [phone].    We comply with applicable federal civil rights laws and [place] laws. We do not discriminate on the basis of race, color, national origin, age, disability sex, sexual orientation or gender identity.            Thank you!     Thank you for choosing " [facility]  for your care. Our goal is always to provide you with excellent care. Hearing back from our patients is one way we can continue to improve our services. Please take a few minutes to complete the written survey that you may receive in the mail after your visit with us. Thank you!             Your Updated Medication List - Protect others around you: Learn how to safely use, store and throw away your medicines at www.disposemymeds.org.          This list is accurate as of: 9/25/17  4:31 PM.  Always use your most recent med list.                   Brand Name Dispense Instructions for use Diagnosis    albuterol 108 (90 BASE) MCG/ACT Inhaler    PROAIR HFA/PROVENTIL HFA/VENTOLIN HFA    1 Inhaler    Inhale 2 puffs into the lungs every 4 hours as needed for shortness of breath / dyspnea    Mild persistent asthma with acute exacerbation       amoxicillin-clavulanate 875-125 MG per tablet    AUGMENTIN    20 tablet    Take 1 tablet by mouth 2 times daily    Acute sinusitis with symptoms > 10 days       AZELAIC ACID EX           cyclobenzaprine 10 MG tablet    FLEXERIL    14 tablet    Take 1 tablet (10 mg) by mouth once as needed for muscle spasms (at bedtime)    Shoulder injury, right, initial encounter       DIFFERIN EX           fexofenadine 180 MG tablet    ALLEGRA     1 tablet daily. for allergy symptoms.        FISH OIL PO      Take 1 tablet by mouth daily as needed.        fluticasone 50 MCG/ACT spray    FLONASE    16 g    Spray 2 sprays into both nostrils daily    Nasal congestion, Chronic rhinitis       fluticasone-salmeterol 250-50 MCG/DOSE diskus inhaler    ADVAIR    1 Inhaler    Inhale 1 puff into the lungs every 12 hours    Mild persistent asthma without complication       hydroquinone 4 % Crea     60 g    Apply twice a day to affected area of the face for 4 months max and then take a 1 month break.    Melasma, Post-inflammatory hyperpigmentation       levothyroxine 75 MCG tablet     SYNTHROID/LEVOTHROID    90 tablet    Take 1 tablet (75 mcg) by mouth daily    Hypothyroidism due to acquired atrophy of thyroid       MULTIVITAMIN PO      Take 1 tablet by mouth daily.        ranitidine 300 MG tablet    ZANTAC    90 tablet    Take 1 tablet (300 mg) by mouth At Bedtime    Gastroesophageal reflux disease without esophagitis       REFRESH OP      Apply to eye as needed        tranexamic acid 650 MG tablet    LYSTEDA    15 tablet    Please use a pill cutter and cut pill in half. Then take half pill twice a day.    Melasma

## 2017-09-26 NOTE — PROGRESS NOTES
SUBJECTIVE:  Return visit for this pleasant woman, Misti Baugh, who has been troubled with melasma for many years.  She is currently using hydroquinone %.  She did not  the tranexamic acid pills fearing the side effects of this medication.  She comes in for suggestions as to what she should do about this persistent melasma.      OBJECTIVE:  Shows a healthy lady in no distress.  On her cheeks is mild melasma when  compared to other women with this dermatosis, but with definite features of this problem on both cheeks.    A: Melasma      PLAN:   1.  I suggest that she use 2 sunscreens, a sunblock with zinc oxide or titanium dioxide, as well as the sunscreen that Dr. Magdaleno recommended.   2.   That she use a Differin  Gel and combine that with 1% hydrocortisone cream and the 4% hydroquinone bleach.  We discussed ochronosis pigmentation as a possibility with using stronger bleaching agents of the hydroquinone family, but that she was unlikely to experience this side effect.    3.  I suggested  that she try to use coverup makeup as much as possible and I did advise that melasma is extremely difficult to treat and that some women do not respond very well until time passes and as they get older it usually slowly resolves.    4.  Return 2-3 months if seeing little progress of improvement.        Meds and allergies reviewed.         MAGALY MCGHEE MD             D: 2017 16:09   T: 2017 09:45   MT: TS      Name:     MISTI BAUGH   MRN:      [id number]        Account:      [id number]   :      1960           Visit Date:   2017      Document: [id number]

## 2017-11-16 ENCOUNTER — OFFICE VISIT (OUTPATIENT)
Dept: FAMILY MEDICINE | Facility: CLINIC | Age: 57
End: 2017-11-16
Payer: COMMERCIAL

## 2017-11-16 ENCOUNTER — RADIANT APPOINTMENT (OUTPATIENT)
Dept: GENERAL RADIOLOGY | Facility: CLINIC | Age: 57
End: 2017-11-16
Attending: FAMILY MEDICINE
Payer: COMMERCIAL

## 2017-11-16 VITALS
HEIGHT: 67 IN | WEIGHT: 173 LBS | BODY MASS INDEX: 27.15 KG/M2 | HEART RATE: 67 BPM | DIASTOLIC BLOOD PRESSURE: 81 MMHG | TEMPERATURE: 97.5 F | OXYGEN SATURATION: 97 % | SYSTOLIC BLOOD PRESSURE: 132 MMHG
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
## 2017-11-16 DIAGNOSIS — M54.2 CERVICALGIA: ICD-10-CM

## 2017-11-16 DIAGNOSIS — S09.90XA CLOSED HEAD INJURY, INITIAL ENCOUNTER: ICD-10-CM

## 2017-11-16 DIAGNOSIS — G44.319 ACUTE POST-TRAUMATIC HEADACHE, NOT INTRACTABLE: ICD-10-CM

## 2017-11-16 PROCEDURE — 72040 X-RAY EXAM NECK SPINE 2-3 VW: CPT

## 2017-11-16 PROCEDURE — 99214 OFFICE O/P EST MOD 30 MIN: CPT | Performed by: FAMILY MEDICINE

## 2017-11-16 RX ORDER — ETODOLAC 400 MG
400 TABLET ORAL 2 TIMES DAILY WITH MEALS
Qty: 60 TABLET | Refills: 1 | Status: SHIPPED | OUTPATIENT
Start: 2017-11-16 | End: 2017-12-18

## 2017-11-16 ASSESSMENT — PAIN SCALES - GENERAL: PAINLEVEL: SEVERE PAIN (6)

## 2017-11-16 NOTE — PATIENT INSTRUCTIONS
At [facility], we strive to deliver an exceptional experience to you, every time we see you.  If you receive a survey in the mail, please send us back your thoughts. We really do value your feedback.    Based on your medical history, these are the current health maintenance/preventive care services that you are due for (some may have been done at this visit.)  There are no preventive care reminders to display for this patient.      Suggested websites for health information:  Www.Coolin.org : Up to date and easily searchable information on multiple topics.  Www.medlineplus.gov : medication info, interactive tutorials, watch real surgeries online  Www.familydoctor.org : good info from the Academy of Family Physicians  Www.cdc.gov : public health info, travel advisories, epidemics (H1N1)  Www.aap.org : children's health info, normal development, vaccinations  Www.health.[facility].us : [place] dept of health, public health issues in [place], N1N1    Your care team:                            Family Medicine Internal Medicine   MD Rito Pepper MD Shantel Branch-Fleming, MD Katya Georgiev PA-C Nam Ho, MD Pediatrics   MARCELA Cobian, LIAS NINO CNP   MD Inez Jeffers MD Deborah Mielke, MD Kim Thein, APRJUANITA [facility]      Clinic hours: Monday - Thursday 7 am-7 pm; Fridays 7 am-5 pm.   Urgent care: Monday - Friday 11 am-9 pm; Saturday and Sunday 9 am-5 pm.  Pharmacy : Monday -Thursday 8 am-8 pm; Friday 8 am-6 pm; Saturday and Sunday 9 am-5 pm.     Clinic: [phone]   Pharmacy: [phone]    Please call the [facility]  [phone] to schedule your head CT.    * HEAD INJURY, no wake-up (Adult)    You have had a head injury. It does not appear serious at this time. Sometimes symptoms of a more serious problem (concussion, bruising or bleeding in the brain) may appear later. Therefore, watch for the warning  signs listed below.  HOME CARE:    During the next 24 hours someone must stay with you to check for the signs below. It is not necessary to stay awake or be awakened during the night.    If you have swelling of the face or scalp, apply an ice pack (ice cubes in a plastic bag, wrapped in a towel) for 20 minutes. Do this every 1-2 hours until the swelling starts to go down.    You may use acetaminophen (Tylenol) 650 1000 mg every 6 hours or ibuprofen (Motrin, Advil) 600 mg every 6 8 hours with food to control pain, if you are able to take these medicines. [NOTE: If you have chronic liver or kidney disease or ever had a stomach ulcer or GI bleeding, talk with your doctor before using these medicines.] Do not take aspirin after a head injury.    For the next 24 hours:    Do not take alcohol, sedatives or medicines that make you sleepy.    Do not drive or operate machinery.    Avoid strenuous activities. No lifting or straining.    If you have had any symptoms of a concussion today (nausea, vomiting, dizziness, confusion, headache, memory loss or if you were knocked out), do not return to sports or any activity that could result in another head injury until 2 full weeks after all symptoms are gone and you have been cleared by your doctor. A second head injury before fully recovering from the first one can lead to serious brain injury.  FOLLOW UP with your doctor if symptoms are not improving after 24 hours, or as directed.  GET PROMPT MEDICAL ATTENTION if any of the following warning signs occur:    Repeated vomiting    Severe or worsening headache or dizziness    Unusual drowsiness, or unable to awaken as usual    Confusion or change in behavior or speech, memory loss, blurred vision    Convulsion (seizure)    Increasing scalp or face swelling    Redness, warmth or pus from the swollen area    Fluid drainage or bleeding from the nose or ears    7135-8822 [employer]. [address] [place] [address], [place]  [address]. All rights reserved. This information is not intended as a substitute for professional medical care. Always follow your healthcare professional's instructions.  This information has been modified by your health care provider with permission from the publisher.    Head Trauma (Traumatic Brain Injury)     After treatment for head trauma, know which symptoms to watch for. Then call for medical help.     Head trauma can be fatal. The effects from some types of head trauma may not appear right away. So it s important to get medical attention for any severe head injury.  Warning  Do not move a person with a head injury unless it is necessary to save his or her life. Call 911 and wait for help. Head trauma often comes with severe neck injury. Sudden movements can result in paralysis.   Call 911  Call 911 right away after a head blow that results in:    Prolonged loss of consciousness (more than a few seconds) or somnolence (prolonged drowsiness)     Memory problems or confusion    Severe headache    Nausea or vomiting    Pupils dilated or different sizes    Severe bleeding    Blood or watery fluid leaking from nose or ears    Broken skull or a soft spot on skull    Slow breathing    Loss of balance    Weakness of or trouble using an arm or leg    Slurred speech    Seizure  What to expect in the ER  Here is what will happen:     A neurological exam is likely. This is a series of simple questions and tests that evaluate the nervous system. Reflexes, response to pain, and mental state are assessed.    The healthcare provider shines a bright light into the eyes to check how the pupils respond. This can reveal more about any head injuries.    A computed tomography (CT) scan may be done. A CT scan does not always need to be done, especially if symptoms are minor or have resolved. This test combines X-rays and computer scans to create detailed images of the brain.  Treatment for head trauma  Here is what is generally  done:     Sometimes, severe head injuries cause bleeding on the brain that requires immediate surgery. In certain cases, the injured person will be watched closely and taken for surgery only if injuries become worse. After surgery, special care helps prevent further brain damage.    Minor head trauma may need little treatment beyond pain control. The healthcare provider may suggest using cold packs to reduce swelling and pain.  Once you are home  At home, call 911 right away if the affected person:    Becomes very drowsy or confused    Has a headache or trouble seeing    Has a stiff neck or muscle weakness    Vomits    Has seizures  Date Last Reviewed: 10/8/2015    [id number] [employer]. [address] [place] [address], [place] [address]. All rights reserved. This information is not intended as a substitute for professional medical care. Always follow your healthcare professional's instructions.
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
## 2017-11-21 ENCOUNTER — RADIANT APPOINTMENT (OUTPATIENT)
Dept: CT IMAGING | Facility: CLINIC | Age: 57
End: 2017-11-21
Attending: FAMILY MEDICINE
Payer: COMMERCIAL
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
## 2017-11-21 DIAGNOSIS — S09.90XA CLOSED HEAD INJURY, INITIAL ENCOUNTER: ICD-10-CM

## 2017-11-21 PROCEDURE — 70450 CT HEAD/BRAIN W/O DYE: CPT | Mod: TC
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
## 2017-12-18 ENCOUNTER — OFFICE VISIT (OUTPATIENT)
Dept: FAMILY MEDICINE | Facility: CLINIC | Age: 57
End: 2017-12-18
Payer: COMMERCIAL

## 2017-12-18 VITALS
DIASTOLIC BLOOD PRESSURE: 81 MMHG | HEART RATE: 68 BPM | BODY MASS INDEX: 27.31 KG/M2 | TEMPERATURE: 97.1 F | SYSTOLIC BLOOD PRESSURE: 128 MMHG | OXYGEN SATURATION: 99 % | WEIGHT: 174 LBS | HEIGHT: 67 IN

## 2017-12-18 DIAGNOSIS — M47.812 SPONDYLOSIS OF CERVICAL REGION WITHOUT MYELOPATHY OR RADICULOPATHY: ICD-10-CM

## 2017-12-18 DIAGNOSIS — M54.2 CERVICALGIA: ICD-10-CM
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
## 2017-12-18 PROCEDURE — 99213 OFFICE O/P EST LOW 20 MIN: CPT | Performed by: FAMILY MEDICINE

## 2017-12-18 RX ORDER — SULINDAC 200 MG/1
200 TABLET ORAL 2 TIMES DAILY WITH MEALS
Qty: 60 TABLET | Refills: 1 | Status: SHIPPED | OUTPATIENT
Start: 2017-12-18 | End: 2019-03-28

## 2017-12-18 ASSESSMENT — PAIN SCALES - GENERAL: PAINLEVEL: MODERATE PAIN (5)
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
## 2017-12-18 NOTE — NURSING NOTE
"Chief Complaint   Patient presents with     Pain     Neck, Shoulder & head        Initial /81 (BP Location: Left arm, Patient Position: Chair, Cuff Size: Adult Large)  Pulse 68  Temp 97.1  F (36.2  C) (Oral)  Ht 5' 6.5\" (1.689 m)  Wt 174 lb (78.9 kg)  LMP 01/13/2012  SpO2 99%  BMI 27.66 kg/m2 Estimated body mass index is 27.66 kg/(m^2) as calculated from the following:    Height as of this encounter: 5' 6.5\" (1.689 m).    Weight as of this encounter: 174 lb (78.9 kg).  Medication Reconciliation: sunita Reina MA      "
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
## 2017-12-19 ASSESSMENT — ASTHMA QUESTIONNAIRES: ACT_TOTALSCORE: 25

## 2017-12-22 ENCOUNTER — PRE VISIT (OUTPATIENT)
Dept: NEUROLOGY | Facility: CLINIC | Age: 57
End: 2017-12-22

## 2017-12-22 NOTE — TELEPHONE ENCOUNTER
Left message for patient to call [employer] at [phone] to go over previsit plan. Routing to Neurology pool.

## 2017-12-28 ENCOUNTER — THERAPY VISIT (OUTPATIENT)
Dept: PHYSICAL THERAPY | Facility: CLINIC | Age: 57
End: 2017-12-28
Payer: COMMERCIAL

## 2017-12-28 DIAGNOSIS — M54.2 CERVICALGIA: Primary | ICD-10-CM

## 2017-12-28 PROCEDURE — 97110 THERAPEUTIC EXERCISES: CPT | Mod: GP | Performed by: PHYSICAL THERAPIST

## 2017-12-28 PROCEDURE — 97161 PT EVAL LOW COMPLEX 20 MIN: CPT | Mod: GP | Performed by: PHYSICAL THERAPIST

## 2017-12-28 PROCEDURE — 97112 NEUROMUSCULAR REEDUCATION: CPT | Mod: GP | Performed by: PHYSICAL THERAPIST
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
## 2017-12-29 PROBLEM — M54.2 CERVICALGIA: Status: ACTIVE | Noted: 2017-12-29

## 2018-01-04 ENCOUNTER — THERAPY VISIT (OUTPATIENT)
Dept: PHYSICAL THERAPY | Facility: CLINIC | Age: 58
End: 2018-01-04
Payer: COMMERCIAL

## 2018-01-04 DIAGNOSIS — M54.2 CERVICALGIA: ICD-10-CM

## 2018-01-04 PROCEDURE — 97140 MANUAL THERAPY 1/> REGIONS: CPT | Mod: GP | Performed by: PHYSICAL THERAPIST

## 2018-01-04 PROCEDURE — 97035 APP MDLTY 1+ULTRASOUND EA 15: CPT | Mod: GP | Performed by: PHYSICAL THERAPIST

## 2018-01-04 PROCEDURE — 97110 THERAPEUTIC EXERCISES: CPT | Mod: GP | Performed by: PHYSICAL THERAPIST

## 2018-01-11 ENCOUNTER — THERAPY VISIT (OUTPATIENT)
Dept: PHYSICAL THERAPY | Facility: CLINIC | Age: 58
End: 2018-01-11
Payer: COMMERCIAL

## 2018-01-11 DIAGNOSIS — M54.2 CERVICALGIA: ICD-10-CM

## 2018-01-11 PROCEDURE — 97112 NEUROMUSCULAR REEDUCATION: CPT | Mod: GP | Performed by: PHYSICAL THERAPIST

## 2018-01-11 PROCEDURE — 97140 MANUAL THERAPY 1/> REGIONS: CPT | Mod: GP | Performed by: PHYSICAL THERAPIST

## 2018-01-11 PROCEDURE — 97035 APP MDLTY 1+ULTRASOUND EA 15: CPT | Mod: GP | Performed by: PHYSICAL THERAPIST

## 2018-01-11 PROCEDURE — 97110 THERAPEUTIC EXERCISES: CPT | Mod: GP | Performed by: PHYSICAL THERAPIST

## 2018-01-18 ENCOUNTER — THERAPY VISIT (OUTPATIENT)
Dept: PHYSICAL THERAPY | Facility: CLINIC | Age: 58
End: 2018-01-18
Payer: COMMERCIAL

## 2018-01-18 DIAGNOSIS — M54.2 CERVICALGIA: ICD-10-CM

## 2018-01-18 PROCEDURE — 97140 MANUAL THERAPY 1/> REGIONS: CPT | Mod: GP | Performed by: PHYSICAL THERAPIST

## 2018-01-18 PROCEDURE — 97110 THERAPEUTIC EXERCISES: CPT | Mod: GP | Performed by: PHYSICAL THERAPIST

## 2018-01-24 ENCOUNTER — OFFICE VISIT (OUTPATIENT)
Dept: FAMILY MEDICINE | Facility: CLINIC | Age: 58
End: 2018-01-24
Payer: COMMERCIAL

## 2018-01-24 ENCOUNTER — TELEPHONE (OUTPATIENT)
Dept: FAMILY MEDICINE | Facility: CLINIC | Age: 58
End: 2018-01-24

## 2018-01-24 VITALS
SYSTOLIC BLOOD PRESSURE: 121 MMHG | HEIGHT: 67 IN | OXYGEN SATURATION: 100 % | WEIGHT: 166.2 LBS | BODY MASS INDEX: 26.09 KG/M2 | DIASTOLIC BLOOD PRESSURE: 85 MMHG | TEMPERATURE: 97.7 F | HEART RATE: 63 BPM

## 2018-01-24 DIAGNOSIS — E03.9 ACQUIRED HYPOTHYROIDISM: ICD-10-CM

## 2018-01-24 DIAGNOSIS — J30.1 CHRONIC SEASONAL ALLERGIC RHINITIS DUE TO POLLEN: ICD-10-CM

## 2018-01-24 DIAGNOSIS — Z12.31 VISIT FOR SCREENING MAMMOGRAM: ICD-10-CM

## 2018-01-24 DIAGNOSIS — Z00.00 ROUTINE GENERAL MEDICAL EXAMINATION AT A HEALTH CARE FACILITY: Primary | ICD-10-CM

## 2018-01-24 DIAGNOSIS — Z13.6 CARDIOVASCULAR SCREENING; LDL GOAL LESS THAN 160: ICD-10-CM

## 2018-01-24 DIAGNOSIS — E03.4 HYPOTHYROIDISM DUE TO ACQUIRED ATROPHY OF THYROID: ICD-10-CM

## 2018-01-24 DIAGNOSIS — Z78.0 POST-MENOPAUSAL: ICD-10-CM

## 2018-01-24 DIAGNOSIS — K21.9 GASTROESOPHAGEAL REFLUX DISEASE WITHOUT ESOPHAGITIS: ICD-10-CM

## 2018-01-24 DIAGNOSIS — Z12.4 SCREENING FOR MALIGNANT NEOPLASM OF CERVIX: ICD-10-CM

## 2018-01-24 DIAGNOSIS — R09.81 NASAL CONGESTION: ICD-10-CM

## 2018-01-24 LAB
ALBUMIN SERPL-MCNC: 3.2 G/DL (ref 3.4–5)
ALP SERPL-CCNC: 62 U/L (ref 40–150)
ALT SERPL W P-5'-P-CCNC: 16 U/L (ref 0–50)
ANION GAP SERPL CALCULATED.3IONS-SCNC: 6 MMOL/L (ref 3–14)
AST SERPL W P-5'-P-CCNC: 16 U/L (ref 0–45)
BASOPHILS # BLD AUTO: 0 10E9/L (ref 0–0.2)
BASOPHILS NFR BLD AUTO: 0.5 %
BILIRUB SERPL-MCNC: 0.3 MG/DL (ref 0.2–1.3)
BUN SERPL-MCNC: 8 MG/DL (ref 7–30)
CALCIUM SERPL-MCNC: 8.6 MG/DL (ref 8.5–10.1)
CHLORIDE SERPL-SCNC: 102 MMOL/L (ref 94–109)
CHOLEST SERPL-MCNC: 227 MG/DL
CO2 SERPL-SCNC: 27 MMOL/L (ref 20–32)
CREAT SERPL-MCNC: 0.79 MG/DL (ref 0.52–1.04)
DIFFERENTIAL METHOD BLD: ABNORMAL
EOSINOPHIL # BLD AUTO: 0.3 10E9/L (ref 0–0.7)
EOSINOPHIL NFR BLD AUTO: 6.8 %
ERYTHROCYTE [DISTWIDTH] IN BLOOD BY AUTOMATED COUNT: 13.2 % (ref 10–15)
GFR SERPL CREATININE-BSD FRML MDRD: 75 ML/MIN/1.7M2
GLUCOSE SERPL-MCNC: 88 MG/DL (ref 70–99)
HCT VFR BLD AUTO: 45.4 % (ref 35–47)
HDLC SERPL-MCNC: 60 MG/DL
HGB BLD-MCNC: 14.7 G/DL (ref 11.7–15.7)
IMM GRANULOCYTES # BLD: 0 10E9/L (ref 0–0.4)
IMM GRANULOCYTES NFR BLD: 0.3 %
LDLC SERPL CALC-MCNC: 148 MG/DL
LYMPHOCYTES # BLD AUTO: 1.9 10E9/L (ref 0.8–5.3)
LYMPHOCYTES NFR BLD AUTO: 52.4 %
MCH RBC QN AUTO: 28.7 PG (ref 26.5–33)
MCHC RBC AUTO-ENTMCNC: 32.4 G/DL (ref 31.5–36.5)
MCV RBC AUTO: 89 FL (ref 78–100)
MONOCYTES # BLD AUTO: 0.3 10E9/L (ref 0–1.3)
MONOCYTES NFR BLD AUTO: 7 %
NEUTROPHILS # BLD AUTO: 1.2 10E9/L (ref 1.6–8.3)
NEUTROPHILS NFR BLD AUTO: 33 %
NONHDLC SERPL-MCNC: 167 MG/DL
NRBC # BLD AUTO: 0 10*3/UL
NRBC BLD AUTO-RTO: 0 /100
PLATELET # BLD AUTO: 224 10E9/L (ref 150–450)
POTASSIUM SERPL-SCNC: 3.9 MMOL/L (ref 3.4–5.3)
PROT SERPL-MCNC: 7.2 G/DL (ref 6.8–8.8)
RBC # BLD AUTO: 5.13 10E12/L (ref 3.8–5.2)
SODIUM SERPL-SCNC: 135 MMOL/L (ref 133–144)
TRIGL SERPL-MCNC: 97 MG/DL
TSH SERPL DL<=0.005 MIU/L-ACNC: 1.71 MU/L (ref 0.4–4)
WBC # BLD AUTO: 3.7 10E9/L (ref 4–11)

## 2018-01-24 RX ORDER — LEVOTHYROXINE SODIUM 75 UG/1
75 TABLET ORAL DAILY
Qty: 90 TABLET | Refills: 3 | Status: SHIPPED | OUTPATIENT
Start: 2018-01-24 | End: 2018-11-07

## 2018-01-24 RX ORDER — FLUTICASONE PROPIONATE 50 MCG
2 SPRAY, SUSPENSION (ML) NASAL DAILY
Qty: 16 G | Refills: 3 | Status: SHIPPED | OUTPATIENT
Start: 2018-01-24 | End: 2019-03-28

## 2018-01-24 ASSESSMENT — PAIN SCALES - GENERAL: PAINLEVEL: MILD PAIN (3)

## 2018-01-24 NOTE — NURSING NOTE
Chief Complaint   Patient presents with     Physical     Patient here for physical.       Kathleen Herman CMA at 3:18 PM on 1/24/2018.
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
## 2018-01-24 NOTE — TELEPHONE ENCOUNTER
Panel Management Review      BP Readings from Last 1 Encounters:   12/18/17 128/81    , No results found for: A1C, 1/9/2018  Last Office Visit with this department: 1/9/2018    Fail List measure: pap      Patient is due/failing the following:   PAP    Action needed:   Cervical cancer screening    Type of outreach:    Sent letter.    Questions for provider review:    None                                                                                                                                    Margarette Galvan MA      Chart routed to  .

## 2018-01-24 NOTE — PATIENT INSTRUCTIONS
[facility]: [phone]     [facility] Medication Refill Request Information:  * Please contact your pharmacy regarding ANY request for medication refills.  ** [facility] Prescription Fax = [phone]  * Please allow 3 business days for routine medication refills.  * Please allow 5 business days for controlled substance medication refills.     [facility] Test Result notification information:  *You will be notified with in 7-10 days of your appointment day regarding the results of your test.  If you are on MyChart you will be notified as soon as the provider has reviewed the results and signed off on them.      [facility] [phone]

## 2018-01-24 NOTE — LETTER
January 24, 2018      Carolina Baugh  [address] [address] [address]          Dear Carolina Baugh,     At [facility] we care about your health and are committed to providing quality patient care.    Which includes staying current on preventive cancer screenings.  You can increase your chances of finding and treating cancers through regular screenings.      Our records indicate you may be due for the following preventive screening(s):    Cervical Cancer Screening    Pap smear is a screening test used to detect cervical cancer. It is recommended every three years for women 21 and older. The test should be done at least once every three years but women who are at greater risk for cervical cancer may need to have the test more often.    To schedule an appointment or discuss this screening further, you may contact us by phone at the [facility] at [phone] or online through the patient portal/[email or web address] @ [email or web address]    If you have had any of the screenings listed above at another facility, please call us so that we may update your chart.      Your partners in health,      Quality Committee at [facility]/[employer]

## 2018-01-24 NOTE — LETTER
Patient:  Carolina Hanna  :   1960  MRN:     [id number]        Ms. Carolina Hanna  [address] [address] [address]        2018    Dear Ms. Hanna,    Thank you for choosing the [facility] for your healthcare needs.  We appreciate the opportunity to serve you.    The following are your recent test results.     Dear Carolina Hanna    Your PAP was normal with negative HPV good results indicating your next PAP may be in 5 years due 2023.   Best wishes,   Karin Sky MD     Results for orders placed or performed in visit on 18   Pap imaged thin layer screen with HPV - recommended age 30 - 65 years (select HPV order below)   Result Value Ref Range    PAP NIL     Copath Report         Patient Name: CAROLINA HANNA  MR#: [id number]  Specimen #: [id number]  Collected: 2018  Received: 2018  Reported: 2018 10:29  Ordering Phy(s): KARIN SKY    For improved result formatting, select 'View Enhanced Report Format' under   Linked Documents section.    SPECIMEN/STAIN PROCESS:  Pap imaged thin layer prep screening (Surepath, FocalPoint with guided   screening)       Pap-Cyto x 1, HPV ordered x 1    SOURCE: Cervical, endocervical  ----------------------------------------------------------------   Pap imaged thin layer prep screening (Surepath, FocalPoint with guided   screening)  SPECIMEN ADEQUACY:  Satisfactory for evaluation.  -Transformation zone component present.    CYTOLOGIC INTERPRETATION:    Negative for intraepithelial lesion or malignancy    Electronically signed out by:  DONNELL Gleason (ASCP)    Processed and screened at [facility],   [facility]    CLINICAL HISTORY:  LMP: 2012  Post Menopausal,  Previous normal pap  Date of Last Pap: 2014,    Papanicolaou Test Limitations:  Cervical cytology is a screening test with   limited sensitivity;  regular  screening is critical for cancer prevention; Pap tests are primarily   effective for the diagnosis/prevention of  squamous cell carcinoma, not adenocarcinomas or other cancers.    TESTING LAB LOCATION:  [facility], [address]  [address] [phone]    COLLECTION SITE:  Client:  [facility]  Location: [facility] (B)       HPV High Risk Types DNA Cervical   Result Value Ref Range    HPV Source SurePath     HPV 16 DNA Negative NEG^Negative    HPV 18 DNA Negative NEG^Negative    Other HR HPV Negative NEG^Negative    Final Diagnosis This patient's sample is negative for HPV DNA.     Specimen Description Cervical Cells

## 2018-01-26 LAB
COPATH REPORT: NORMAL
PAP: NORMAL

## 2018-01-30 LAB
FINAL DIAGNOSIS: NORMAL
HPV HR 12 DNA CVX QL NAA+PROBE: NEGATIVE
HPV16 DNA SPEC QL NAA+PROBE: NEGATIVE
HPV18 DNA SPEC QL NAA+PROBE: NEGATIVE
SPECIMEN DESCRIPTION: NORMAL
SPECIMEN SOURCE CVX/VAG CYTO: NORMAL

## 2018-02-12 ENCOUNTER — PRE VISIT (OUTPATIENT)
Dept: NEUROLOGY | Facility: CLINIC | Age: 58
End: 2018-02-12
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
## 2018-02-13 ENCOUNTER — OFFICE VISIT (OUTPATIENT)
Dept: NEUROLOGY | Facility: CLINIC | Age: 58
End: 2018-02-13
Payer: COMMERCIAL

## 2018-02-13 VITALS
BODY MASS INDEX: 26.25 KG/M2 | TEMPERATURE: 98.2 F | HEART RATE: 66 BPM | WEIGHT: 165.1 LBS | DIASTOLIC BLOOD PRESSURE: 84 MMHG | SYSTOLIC BLOOD PRESSURE: 127 MMHG | OXYGEN SATURATION: 98 %

## 2018-02-13 DIAGNOSIS — G44.219 EPISODIC TENSION-TYPE HEADACHE, NOT INTRACTABLE: Primary | ICD-10-CM

## 2018-02-13 PROCEDURE — 99203 OFFICE O/P NEW LOW 30 MIN: CPT | Performed by: PSYCHIATRY & NEUROLOGY

## 2018-02-13 RX ORDER — GABAPENTIN 300 MG/1
300 CAPSULE ORAL 2 TIMES DAILY
Qty: 60 CAPSULE | Refills: 1 | Status: SHIPPED | OUTPATIENT
Start: 2018-02-13 | End: 2019-03-28
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
## 2018-02-13 NOTE — NURSING NOTE
"Carolina Baugh's goals for this visit include: Headaches  She requests these members of her care team be copied on today's visit information: yes    PCP: Teto Mesa    Referring Provider:  Valeria Ralph MD  [address] [place] [address]    Chief Complaint   Patient presents with     Consult       Initial /84 (BP Location: Left arm, Patient Position: Chair, Cuff Size: Adult Regular)  Pulse 66  Temp 98.2  F (36.8  C) (Oral)  Wt 74.9 kg (165 lb 1.6 oz)  LMP 01/13/2012  SpO2 98%  BMI 26.25 kg/m2 Estimated body mass index is 26.25 kg/(m^2) as calculated from the following:    Height as of 1/24/18: 1.689 m (5' 6.5\").    Weight as of this encounter: 74.9 kg (165 lb 1.6 oz).  Medication Reconciliation: complete    Do you need any medication refills at today's visit? no    "
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
## 2018-03-28 ENCOUNTER — OFFICE VISIT (OUTPATIENT)
Dept: NEUROLOGY | Facility: CLINIC | Age: 58
End: 2018-03-28
Payer: COMMERCIAL

## 2018-03-28 VITALS
DIASTOLIC BLOOD PRESSURE: 82 MMHG | WEIGHT: 161.1 LBS | HEART RATE: 81 BPM | OXYGEN SATURATION: 98 % | BODY MASS INDEX: 25.61 KG/M2 | SYSTOLIC BLOOD PRESSURE: 112 MMHG

## 2018-03-28 DIAGNOSIS — M54.2 CERVICALGIA: Primary | ICD-10-CM

## 2018-03-28 PROCEDURE — 99213 OFFICE O/P EST LOW 20 MIN: CPT | Performed by: PSYCHIATRY & NEUROLOGY

## 2018-03-28 ASSESSMENT — PAIN SCALES - GENERAL: PAINLEVEL: MILD PAIN (3)

## 2018-03-28 NOTE — NURSING NOTE
"Carolina Baugh's goals for this visit include: recheck  She requests these members of her care team be copied on today's visit information:     PCP: [facility]    Referring Provider:  No referring provider defined for this encounter.    Chief Complaint   Patient presents with     RECHECK       Initial /82  Pulse 81  Wt 73.1 kg (161 lb 1.6 oz)  LMP 01/13/2012  SpO2 98%  BMI 25.61 kg/m2 Estimated body mass index is 25.61 kg/(m^2) as calculated from the following:    Height as of 1/24/18: 1.689 m (5' 6.5\").    Weight as of this encounter: 73.1 kg (161 lb 1.6 oz).  Medication Reconciliation: complete    "
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
## 2018-04-18 ENCOUNTER — ALLIED HEALTH/NURSE VISIT (OUTPATIENT)
Dept: NURSING | Facility: CLINIC | Age: 58
End: 2018-04-18
Payer: COMMERCIAL

## 2018-04-18 DIAGNOSIS — Z09 NEED FOR IMMUNIZATION FOLLOW-UP: Primary | ICD-10-CM

## 2018-04-18 PROCEDURE — 99207 ZZC NO CHARGE NURSE ONLY: CPT

## 2018-04-18 PROCEDURE — 90471 IMMUNIZATION ADMIN: CPT

## 2018-04-18 PROCEDURE — 90707 MMR VACCINE SC: CPT

## 2018-04-18 NOTE — PROGRESS NOTES
Screening Questionnaire for Adult Immunization    Are you sick today?   No   Do you have allergies to medications, food, a vaccine component or latex?   No   Have you ever had a serious reaction after receiving a vaccination?   No   Do you have a long-term health problem with heart disease, lung disease, asthma, kidney disease, metabolic disease (e.g. diabetes), anemia, or other blood disorder?   No   Do you have cancer, leukemia, HIV/AIDS, or any other immune system problem?   No   In the past 3 months, have you taken medications that affect  your immune system, such as prednisone, other steroids, or anticancer drugs; drugs for the treatment of rheumatoid arthritis, Crohn s disease, or psoriasis; or have you had radiation treatments?   No   Have you had a seizure, or a brain or other nervous system problem?   No   During the past year, have you received a transfusion of blood or blood     products, or been given immune (gamma) globulin or antiviral drug?   No   For women: Are you pregnant or is there a chance you could become        pregnant during the next month?   No   Have you received any vaccinations in the past 4 weeks?   No     Immunization questionnaire answers were all negative.        Per orders of Dr. Ralph, injection of MMR given by Jonna Garcia. Patient instructed to remain in clinic for 15 minutes afterwards, and to report any adverse reaction to me immediately.       Screening performed by Jonna Garcia on 4/18/2018 at 11:45 AM.
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
## 2018-06-18 ENCOUNTER — RADIANT APPOINTMENT (OUTPATIENT)
Dept: MAMMOGRAPHY | Facility: CLINIC | Age: 58
End: 2018-06-18
Attending: FAMILY MEDICINE
Payer: COMMERCIAL

## 2018-06-18 DIAGNOSIS — Z12.31 VISIT FOR SCREENING MAMMOGRAM: ICD-10-CM

## 2018-07-05 ENCOUNTER — OFFICE VISIT (OUTPATIENT)
Dept: DERMATOLOGY | Facility: CLINIC | Age: 58
End: 2018-07-05
Payer: COMMERCIAL

## 2018-07-05 DIAGNOSIS — L81.1 MELASMA: Primary | ICD-10-CM

## 2018-07-05 PROCEDURE — 99213 OFFICE O/P EST LOW 20 MIN: CPT | Performed by: DERMATOLOGY

## 2018-07-05 RX ORDER — AZELAIC ACID 0.15 G/G
GEL TOPICAL
Qty: 50 G | Refills: 5 | Status: SHIPPED | OUTPATIENT
Start: 2018-07-05 | End: 2018-10-30
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
## 2018-07-05 NOTE — NURSING NOTE
@Carolina Baugh's goals for this visit include:   Chief Complaint   Patient presents with     Derm Problem     melasma follow up - had peels done and only helped a little bit - last peel was 4/12/17       She requests these members of her care team be copied on today's visit information: NO    PCP: Teto Mesa    Referring Provider:  Tasha Magdaleno MD  [address] [place] [address] [address]  [place] [address]    [facility] 01/13/2012    Do you need any medication refills at today's visit? NO    Sydney Thrasher, [facility]

## 2018-07-05 NOTE — PROGRESS NOTES
[facility] Dermatology Note      Dermatology Problem List:  1. Melasma and post inflammatory hyperpigmentation, zygoma  -Current Tx: 4% Hydroquinone (initiated 2/10/2017), Differin 0.1% gel (initiated 2/10/2017), and 10% Azelaic acid.   -Consider Tranaxemic acid.  -Previous Tx: Chemical peels (X1 sensitive skin peel, X1 20% sal 3% glycolic, X1 20%)  2. Dermatosis Papillomatosis nigra    Encounter Date: Jul 5, 2018    CC:  Chief Complaint   Patient presents with     Derm Problem     melasma follow up - had peels done and only helped a little bit - last peel was 4/12/17         History of Present Illness:  Ms. Carolina Baugh is a 58 year old female who presents as a follow up for melasma. Now usin ghydrowuinone on the cheeks. Has had improved. She did not take tranexamic acid. She did do chemical peels and this helped. No  finacea was used. Differin is being used. She stopped differin and hydroquinone stopped the last 6 weeks. Se is using sunscreen. Cetaphil sunscreen.     Past Medical History:   Patient Active Problem List   Diagnosis     Mild persistent asthma     Encounter for screening colonoscopy     CARDIOVASCULAR SCREENING; LDL GOAL LESS THAN 160     Sinusitis, chronic     Post-menopausal     Hypothyroidism     Mantoux: positive     Advance care planning     Cervicalgia     Episodic tension-type headache, not intractable     Past Medical History:   Diagnosis Date     Asthma      Dry eye syndrome      Encounter for screening colonoscopy 11/11/11    Normal, next 10 years 2021     Hyperthyroidism      MGD (meibomian gland disease)      Past Surgical History:   Procedure Laterality Date     BIOPSY BREAST Right      ENT SURGERY       Social History:  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 Has 2 daughters    Family History:  There is no family history of skin cancer.  There is no family history of stroke, clotting disorder, bleeding disorder, or MI.   Kept in chart for convenience.        Medications:  Current Outpatient Prescriptions   Medication Sig Dispense Refill     Adapalene (DIFFERIN EX)        AZELAIC ACID EX        fexofenadine (ALLEGRA) 180 MG tablet 1 tablet daily. for allergy symptoms.       fluticasone (FLONASE) 50 MCG/ACT spray Spray 2 sprays into both nostrils daily 16 g 3     gabapentin (NEURONTIN) 300 MG capsule Take 1 capsule (300 mg) by mouth 2 times daily 60 capsule 1     hydroquinone 4 % CREA Apply twice a day to affected area of the face for 4 months max and then take a 1 month break. (Patient not taking: Reported on 7/5/2018) 60 g 3     levothyroxine (SYNTHROID/LEVOTHROID) 75 MCG tablet Take 1 tablet (75 mcg) by mouth daily 90 tablet 3     MULTIVITAMIN OR Take 1 tablet by mouth daily.       Omega-3 Fatty Acids (FISH OIL PO) Take 1 tablet by mouth daily as needed.       Polyvinyl Alcohol-Povidone (REFRESH OP) Apply to eye as needed       ranitidine (ZANTAC) 300 MG tablet Take 1 tablet (300 mg) by mouth At Bedtime 90 tablet 3     sulindac (CLINORIL) 200 MG tablet Take 1 tablet (200 mg) by mouth 2 times daily (with meals) 60 tablet 1     tiZANidine (ZANAFLEX) 4 MG tablet Take 1 tablet (4 mg) by mouth 3 times daily as needed for muscle spasms 60 tablet 0     No Known Allergies    Review of Systems:  -no recent ilnesses    Physical exam:  Vitals: LMP 01/13/2012  GEN: This is a well developed, well-nourished female in no acute distress, in a pleasant mood.    PSYCH: In pleasant mood, appropriate affect  SKIN: Focused examination of the face was performed.  -faint tan patches, lateral cheeks, medial cheeks improved  -No other lesions of concern on areas examined.     Impression/Plan:  1. Melasma: improved with chemical peels x3 and application of 4% hydroquinone and Differin 0.1% gel. Likely worsening due to heat and sun in the summer. Also not using sunscreen stricly.     Continue hydroquinone 4% cream to the lateral cheeks for 4 weeks at night.     Hold differin    Start  finnacea once daily    Sunscreen    Okay for 6 more checmical peels. Same strength as previously this fall when not tan    Keep clothing away from rubbing on face/wear loosely if possible.     Follow up in 3-4 months, earlier for new or changing lesions.     Staff Involved:    Tasha Magdaleno MD    Department of Dermatology  [facility]: Phone: [phone], Fax:[phone]  [facility]: Phone: [phone], Fax: [phone]

## 2018-07-09 ENCOUNTER — TELEPHONE (OUTPATIENT)
Dept: DERMATOLOGY | Facility: CLINIC | Age: 58
End: 2018-07-09

## 2018-07-11 NOTE — TELEPHONE ENCOUNTER
Prior Auth Needed  Drug: Finacea 15% gel  Insurance: [employer]  ID Number: [id number]  Phone Number: [phone]    Please do not close encounter until Prior Auth is approved or denied.    (prerequisite therapy required)    Thank you,  Miroslava Dalal, Pharmacy Intern  [facility]

## 2018-07-12 NOTE — TELEPHONE ENCOUNTER
Central Prior Authorization Team   Phone: [phone]    PA Initiation    Medication: Azelaic Acid 15 % gel  Insurance Company: [employer] - Phone [phone] Fax [phone]  Pharmacy Filling the Rx: [facility] - [facility], [place] - [address]  Filling Pharmacy Phone: [phone]  Filling Pharmacy Fax:    Start Date: 7/12/2018

## 2018-07-17 NOTE — TELEPHONE ENCOUNTER
Recommend buying on [email or web address]    THE ORDINARY  Azelaic Acid Suspension 10%  SIZE 1 oz/ 30 mL ITEM [id number]          online only at [employer]  $7.90

## 2018-07-17 NOTE — TELEPHONE ENCOUNTER
PRIOR AUTHORIZATION DENIED    Medication: Azelaic Acid 15 % gel - denied    Denial Date: 7/13/2018    Denial Rational: script is denied because pt needs to try/fail Soolantra 1% cream                    Appeal Information:

## 2018-08-17 ENCOUNTER — OFFICE VISIT (OUTPATIENT)
Dept: OPHTHALMOLOGY | Facility: CLINIC | Age: 58
End: 2018-08-17
Payer: COMMERCIAL

## 2018-08-17 DIAGNOSIS — H52.4 PRESBYOPIA: Primary | ICD-10-CM

## 2018-08-17 DIAGNOSIS — H02.889 MEIBOMIAN GLAND DYSFUNCTION: ICD-10-CM

## 2018-08-17 ASSESSMENT — REFRACTION_MANIFEST
OS_SPHERE: +0.50
OS_AXIS: 144
OS_ADD: +2.25
OD_SPHERE: +0.25
OD_ADD: +2.25
OD_AXIS: 178
OS_CYLINDER: +0.75
OD_CYLINDER: +0.50

## 2018-08-17 ASSESSMENT — VISUAL ACUITY
OD_SC: 20/25
OD_SC+: -2
METHOD: SNELLEN - LINEAR
OS_SC+: -2
OS_SC: 20/20

## 2018-08-17 ASSESSMENT — CUP TO DISC RATIO
OS_RATIO: 0.2
OD_RATIO: 0.2

## 2018-08-17 ASSESSMENT — CONF VISUAL FIELD
OS_NORMAL: 1
METHOD: COUNTING FINGERS
OD_NORMAL: 1

## 2018-08-17 ASSESSMENT — EXTERNAL EXAM - LEFT EYE: OS_EXAM: NORMAL

## 2018-08-17 ASSESSMENT — TONOMETRY
OS_IOP_MMHG: 13
IOP_METHOD: ICARE
OD_IOP_MMHG: 12

## 2018-08-17 ASSESSMENT — SLIT LAMP EXAM - LIDS
COMMENTS: 1+ MGD
COMMENTS: 1+ MGD

## 2018-08-17 ASSESSMENT — EXTERNAL EXAM - RIGHT EYE: OD_EXAM: NORMAL
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
## 2018-08-17 NOTE — PROGRESS NOTES
Assessment/Plan  (H52.4) Presbyopia  (primary encounter diagnosis)  Comment: Presbyopia OU  Plan: REFRACTION [99461]         Educated patient on condition and clinical findings. Dispensed spectacle prescription for reading only. Educated patient on possibility of adaptation period, if symptoms do not improve return to clinic for further testing.    (H02.89) Meibomian gland dysfunction  Comment: Symptomatic, currently using drops as needed   Plan:  Recommended warm compresses twice each day for ten minutes and continue use of artificial tears as needed. Monitor annually, or sooner if symptoms worsen.    Return to clinic in 1 year for comprehensive eye exam.    Complete documentation of historical and exam elements from today's encounter can  be found in the full encounter summary report (not reduplicated in this progress  note). I personally obtained the chief complaint(s) and history of present illness. I  confirmed and edited as necessary the review of systems, past medical/surgical  history, family history, social history, and examination findings as documented by  others; and I examined the patient myself. I personally reviewed the relevant tests,  images, and reports as documented above. I formulated and edited as necessary the  assessment and plan and discussed the findings and management plan with the  patient and family.    Michael Gomez, JOHN, FAAO

## 2018-08-17 NOTE — NURSING NOTE
Chief Complaints and History of Present Illnesses   Patient presents with     Annual Eye Exam     HPI    Affected eye(s):  Both   Symptoms:     Difficulty with reading   Burning      Frequency:  Constant       Do you have eye pain now?:  No      Comments:  Reading is a little worse, slowly over time.    Burning sensation left eye sometimes, ongoing for years. Uses ATs which helps, warm compresses sometimes.    Susanna Espino COT 9:51 AM August 17, 2018

## 2018-10-30 ENCOUNTER — OFFICE VISIT (OUTPATIENT)
Dept: FAMILY MEDICINE | Facility: CLINIC | Age: 58
End: 2018-10-30
Payer: COMMERCIAL

## 2018-10-30 VITALS
HEART RATE: 88 BPM | SYSTOLIC BLOOD PRESSURE: 134 MMHG | WEIGHT: 161 LBS | OXYGEN SATURATION: 100 % | HEIGHT: 67 IN | DIASTOLIC BLOOD PRESSURE: 88 MMHG | BODY MASS INDEX: 25.27 KG/M2 | TEMPERATURE: 97.9 F

## 2018-10-30 DIAGNOSIS — F51.02 ADJUSTMENT INSOMNIA: Primary | ICD-10-CM

## 2018-10-30 PROCEDURE — 99213 OFFICE O/P EST LOW 20 MIN: CPT | Performed by: PREVENTIVE MEDICINE

## 2018-10-30 RX ORDER — TRAZODONE HYDROCHLORIDE 50 MG/1
50-100 TABLET, FILM COATED ORAL
Qty: 30 TABLET | Refills: 1 | Status: SHIPPED | OUTPATIENT
Start: 2018-10-30 | End: 2018-11-05

## 2018-10-30 ASSESSMENT — PAIN SCALES - GENERAL: PAINLEVEL: NO PAIN (0)

## 2018-10-30 NOTE — PROGRESS NOTES
SUBJECTIVE:   Carolina Baugh is a 58 year old female who presents to clinic today for the following health issues:      Insomnia/headaches      Duration: x 6 months    Description  Frequency of insomnia:  nightly  Time to fall asleep: 3-4 hours  Middle of night awakening:  several times a week  Early morning awakening:  several times a week    Accompanying signs and symptoms:  none    History  Similar episodes in past:  yes  Previous evaluation/sleep study:  no     Precipitating or alleviating factors:  New stressful situation: YES  Caffeine intake after lunchtime: no   OTC decongestants: no   Any new medications: no     Therapies tried and outcome: caffeine avoidance, Benadryl -  not effective, Tylenol pm -  not effective and Advil pm -  not effective   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  There were some verbal conflicts  Having anger, thinking about the conflict.   Has been feeling sad as well, feels sense of betrayal  Feels sad daily  Sleep is the main concern  No restful sleep in the last few weeks  Wakes up several times  No snoring, no sleep apnea history   Some exercise       Asthma managed without medication for the last 4 years.   Uses allergy medication     Problem list and histories reviewed & adjusted, as indicated.  Additional history: as documented    Patient Active Problem List   Diagnosis     Mild persistent asthma     Encounter for screening colonoscopy     CARDIOVASCULAR SCREENING; LDL GOAL LESS THAN 160     Sinusitis, chronic     Post-menopausal     Hypothyroidism     Mantoux: positive     Advance care planning     Cervicalgia     Episodic tension-type headache, not intractable     Past Surgical History:   Procedure Laterality Date     BIOPSY BREAST Right      ENT SURGERY         Social History   Substance Use Topics     Smoking status: Never Smoker     Smokeless tobacco: Never Used      Comment: exposure to second hand smoke     Alcohol use No      Family History   Problem Relation Age of Onset     Asthma Mother      Thyroid Disease Sister      Respiratory Sister      sinus, 2 sisters     Cancer No family hx of      Diabetes No family hx of      Hypertension No family hx of      Cerebrovascular Disease No family hx of      Glaucoma No family hx of      Macular Degeneration No family hx of          Current Outpatient Prescriptions   Medication Sig Dispense Refill     Adapalene (DIFFERIN EX)        fexofenadine (ALLEGRA) 180 MG tablet 1 tablet daily. for allergy symptoms.       fluticasone (FLONASE) 50 MCG/ACT spray Spray 2 sprays into both nostrils daily 16 g 3     gabapentin (NEURONTIN) 300 MG capsule Take 1 capsule (300 mg) by mouth 2 times daily 60 capsule 1     levothyroxine (SYNTHROID/LEVOTHROID) 75 MCG tablet Take 1 tablet (75 mcg) by mouth daily 90 tablet 3     MULTIVITAMIN OR Take 1 tablet by mouth daily.       Omega-3 Fatty Acids (FISH OIL PO) Take 1 tablet by mouth daily as needed.       Polyvinyl Alcohol-Povidone (REFRESH OP) Apply to eye as needed       ranitidine (ZANTAC) 300 MG tablet Take 1 tablet (300 mg) by mouth At Bedtime 90 tablet 3     sulindac (CLINORIL) 200 MG tablet Take 1 tablet (200 mg) by mouth 2 times daily (with meals) 60 tablet 1     tiZANidine (ZANAFLEX) 4 MG tablet Take 1 tablet (4 mg) by mouth 3 times daily as needed for muscle spasms 60 tablet 0     traZODone (DESYREL) 50 MG tablet Take 1-2 tablets ( mg) by mouth nightly as needed for sleep 30 tablet 1     No Known Allergies  BP Readings from Last 3 Encounters:   10/30/18 134/88   03/28/18 112/82   02/13/18 127/84    Wt Readings from Last 3 Encounters:   10/30/18 161 lb (73 kg)   03/28/18 161 lb 1.6 oz (73.1 kg)   02/13/18 165 lb 1.6 oz (74.9 kg)                  Labs reviewed in EPIC    Reviewed and updated as needed this visit by clinical staff  Allergies       Reviewed and updated as needed this visit by Provider         ROS:  Constitutional, HEENT, cardiovascular,  "pulmonary, gi and gu systems are negative, except as otherwise noted.    OBJECTIVE:                                                    /88  Pulse 88  Temp 97.9  F (36.6  C) (Oral)  Ht 5' 6.5\" (1.689 m)  Wt 161 lb (73 kg)  LMP 01/13/2012  SpO2 100%  Breastfeeding? No  BMI 25.6 kg/m2  Body mass index is 25.6 kg/(m^2).  GENERAL APPEARANCE: healthy, alert and no distress  EYES: Eyes grossly normal to inspection and conjunctivae and sclerae normal  RESP: lungs clear to auscultation - no rales, rhonchi or wheezes  CV: regular rates and rhythm, normal S1 S2, no S3 or S4 and no murmur, click or rub  ABDOMEN: soft, non-tender  SKIN: no suspicious lesions or rashes  NEURO: Normal strength and tone, mentation intact and speech normal  PSYCH: mentation appears normal and affect normal    Diagnostic test results:  Diagnostic Test Results:  No results found for this or any previous visit (from the past 24 hour(s)).     ASSESSMENT/PLAN:                                                    1. Adjustment insomnia  -Stay physically active  - traZODone (DESYREL) 50 MG tablet; Take 1-2 tablets ( mg) by mouth nightly as needed for sleep  Dispense: 30 tablet; Refill: 1    I ended our visit today by discussing the patient's diagnoses and recommended treatment. Please refer to today's diagnoses and orders for further details. I briefly discussed the pathophysiology of these conditions and outlined their expected course. I discussed the warning symptoms and signs that indicate an atypical course that would need urgent or emergent care. I also discussed self care strategies for symptom relief.  Patient voiced complete understanding of plan of care and was in full agreement to proceed. After visit summary discussed and handed to patient.    Common side effects of medications prescribed at this visit were discussed with the patient. Severe side effects, including current applicable black box warnings, were discussed. "         Follow up with Provider - if not better in 2 weeks      Valeria Ralph MD MPH    [facility]

## 2018-10-30 NOTE — PATIENT INSTRUCTIONS
At [facility], we strive to deliver an exceptional experience to you, every time we see you.  If you receive a survey in the mail, please send us back your thoughts. We really do value your feedback.    Your care team:                            Family Medicine Internal Medicine   MD Rito Pepper MD Shantel Branch-Fleming, MD Katya Georgiev PA-C Megan Hill, APRN LISA Henao MD Pediatrics   Anthony Pereyra, MARCELA Paulson, MD Becky Singh APRN CNP   MD Inez Jeffers MD Deborah Mielke, MD Taya Cruz, APRN [facility]      Clinic hours: Monday - Thursday 7 am-7 pm; Fridays 7 am-5 pm.   Urgent care: Monday - Friday 11 am-9 pm; Saturday and Sunday 9 am-5 pm.  Pharmacy : Monday -Thursday 8 am-8 pm; Friday 8 am-6 pm; Saturday and Sunday 9 am-5 pm.     Clinic: [phone]   Pharmacy: [phone]
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
## 2018-10-31 ENCOUNTER — TELEPHONE (OUTPATIENT)
Dept: FAMILY MEDICINE | Facility: CLINIC | Age: 58
End: 2018-10-31

## 2018-10-31 ASSESSMENT — ASTHMA QUESTIONNAIRES: ACT_TOTALSCORE: 25

## 2018-10-31 NOTE — TELEPHONE ENCOUNTER
This writer attempted to contact Carolina on 10/31/18      Reason for call medication issues/side effects and left message. Patient was most recently started on trazodone which can have side effects of dry mouth.      If patient calls back:   Patient contacted by a Registered Nurse. Inform patient that someone from the RN group will contact them, document that pt called and route to P DYAD 3 RN POOL [[id number]]        Bacilio Irving RN, BSN

## 2018-11-01 NOTE — TELEPHONE ENCOUNTER
She can stop the  Trazodone.  Is she still taking the Gabapentin 600mg at bedtime?  This should help with sleep as well.has she tried OTC Melatonin?  This can help as well. Dr. Ralph is out of the office until 11/12/18 so if she is wanting an atlernate medication, she'll need to schedule an appt.  Donna NINO, CNP

## 2018-11-01 NOTE — TELEPHONE ENCOUNTER
This writer attempted to contact Carolina on 11/01/18      Reason for call medication instructions/questions and left message.      If patient calls back:   Patient contacted by a Registered Nurse. Inform patient that someone from the RN group will contact them, document that pt called and route to P DYAD 3 RN POOL [[id number]]        Marlene Burns RN

## 2018-11-01 NOTE — TELEPHONE ENCOUNTER
Patient was into the clinic on 10/30/18 for Adjustment insomnia and was prescribed traZODone (DESYREL) 50 MG tablet.    She calls to report side effects of this medication and are as follows: feeling hyper like she drank a lot of caffeiene, Extreme dry mouth even after drinking water, burning feeling in her stomach, muscle aches and dizziness    She denies the following symptoms: trembling, confusion,  Increased or changed heart rate.    Her side effects were noticed after taking 1 tablet and continued after second time of taking medication as well.    Provider please review and advise on medication     Marlene Burns RN

## 2018-11-02 NOTE — TELEPHONE ENCOUNTER
"Called and spoke with patient. Relayed message below per Taya Cruz. Patient has stopped Trazodone use already. Per patient, she has tried Gabapentin at night and has tried many OTC medications, including Melatonin. Nothing helps with insomnia. Writer informed patient of options to wait till Dr. Ralph returns to office (11/13/18) to further discuss or can schedule appt with alternative provider to have discussion about recurring insomnia and medication options. Patient understanding, wanted to schedule with alternative provider as does not want to wait and \"suffer\" till PCP returns. RN assisted in scheduling patient for Monday, Nov 5th at 1:20 pm with Taya Cruz CNP and covering partner of Dr. Ralph's. No further questions at this time.    Delicia Solomon RN  [facility] Triage    "

## 2018-11-05 ENCOUNTER — OFFICE VISIT (OUTPATIENT)
Dept: FAMILY MEDICINE | Facility: CLINIC | Age: 58
End: 2018-11-05
Payer: COMMERCIAL

## 2018-11-05 VITALS
HEIGHT: 67 IN | BODY MASS INDEX: 25.27 KG/M2 | WEIGHT: 161 LBS | HEART RATE: 69 BPM | TEMPERATURE: 97.3 F | SYSTOLIC BLOOD PRESSURE: 138 MMHG | OXYGEN SATURATION: 100 % | DIASTOLIC BLOOD PRESSURE: 90 MMHG

## 2018-11-05 DIAGNOSIS — G47.00 PERSISTENT INSOMNIA: Primary | ICD-10-CM

## 2018-11-05 DIAGNOSIS — E03.9 ACQUIRED HYPOTHYROIDISM: ICD-10-CM

## 2018-11-05 DIAGNOSIS — R03.0 ELEVATED BP WITHOUT DIAGNOSIS OF HYPERTENSION: ICD-10-CM

## 2018-11-05 LAB — TSH SERPL DL<=0.005 MIU/L-ACNC: 2.03 MU/L (ref 0.4–4)

## 2018-11-05 PROCEDURE — 99214 OFFICE O/P EST MOD 30 MIN: CPT | Performed by: NURSE PRACTITIONER

## 2018-11-05 PROCEDURE — 36415 COLL VENOUS BLD VENIPUNCTURE: CPT | Performed by: NURSE PRACTITIONER

## 2018-11-05 PROCEDURE — 84443 ASSAY THYROID STIM HORMONE: CPT | Performed by: NURSE PRACTITIONER

## 2018-11-05 RX ORDER — ZOLPIDEM TARTRATE 5 MG/1
5 TABLET ORAL
Qty: 30 TABLET | Refills: 1 | Status: SHIPPED | OUTPATIENT
Start: 2018-11-05 | End: 2019-03-28
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
## 2018-11-05 NOTE — PROGRESS NOTES
SUBJECTIVE:   Carolina Baugh is a 58 year old female who presents to clinic today for the following health issues:    Insomnia      Duration: eight months off and on    Description  Frequency of insomnia:  several times a week initially, now occurring almost nightly  Time to fall asleep: 3 hours  Middle of night awakening:  several times a week  Early morning awakening:  several times a week    Accompanying signs and symptoms:  morning headache and depression/mood changes    History  Similar episodes in past:  YES  Previous evaluation/sleep study:  no     Precipitating or alleviating factors:  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  She tried Trazodone but it gave her a dry mouth that was intolerable for her.  She requests alternate medication for sleep.  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  She is upset with them, feels betrayed by them as they were reportedly not honest with her.      Hypothyroidism Follow-up      Since last visit, patient describes the following symptoms: Weight stable, no hair loss, no skin changes, no constipation, no loose stools      Problem list and histories reviewed & adjusted, as indicated.  Additional history: as documented    Patient Active Problem List   Diagnosis     Mild persistent asthma     Encounter for screening colonoscopy     CARDIOVASCULAR SCREENING; LDL GOAL LESS THAN 160     Sinusitis, chronic     Post-menopausal     Hypothyroidism     Mantoux: positive     Advance care planning     Cervicalgia     Episodic tension-type headache, not intractable     Past Surgical History:    Procedure Laterality Date     BIOPSY BREAST Right      ENT SURGERY         Social History   Substance Use Topics     Smoking status: Never Smoker     Smokeless tobacco: Never Used      Comment: exposure to second hand smoke     Alcohol use No     Family History   Problem Relation Age of Onset     Asthma Mother      Thyroid Disease Sister      Respiratory Sister      sinus, 2 sisters     Cancer No family hx of      Diabetes No family hx of      Hypertension No family hx of      Cerebrovascular Disease No family hx of      Glaucoma No family hx of      Macular Degeneration No family hx of          Current Outpatient Prescriptions   Medication Sig Dispense Refill     Adapalene (DIFFERIN EX)        fexofenadine (ALLEGRA) 180 MG tablet 1 tablet daily. for allergy symptoms.       fluticasone (FLONASE) 50 MCG/ACT spray Spray 2 sprays into both nostrils daily 16 g 3     gabapentin (NEURONTIN) 300 MG capsule Take 1 capsule (300 mg) by mouth 2 times daily 60 capsule 1     levothyroxine (SYNTHROID/LEVOTHROID) 75 MCG tablet Take 1 tablet (75 mcg) by mouth daily 90 tablet 3     MULTIVITAMIN OR Take 1 tablet by mouth daily.       Omega-3 Fatty Acids (FISH OIL PO) Take 1 tablet by mouth daily as needed.       Polyvinyl Alcohol-Povidone (REFRESH OP) Apply to eye as needed       ranitidine (ZANTAC) 300 MG tablet Take 1 tablet (300 mg) by mouth At Bedtime 90 tablet 3     sulindac (CLINORIL) 200 MG tablet Take 1 tablet (200 mg) by mouth 2 times daily (with meals) 60 tablet 1     tiZANidine (ZANAFLEX) 4 MG tablet Take 1 tablet (4 mg) by mouth 3 times daily as needed for muscle spasms 60 tablet 0     zolpidem (AMBIEN) 5 MG tablet Take 1 tablet (5 mg) by mouth nightly as needed for sleep 30 tablet 1     BP Readings from Last 3 Encounters:   11/05/18 138/90   10/30/18 134/88   03/28/18 112/82    Wt Readings from Last 3 Encounters:   11/05/18 161 lb (73 kg)   10/30/18 161 lb (73 kg)   03/28/18 161 lb 1.6 oz (73.1 kg)                 "    Reviewed and updated as needed this visit by clinical staff  Tobacco  Allergies  Meds  Med Hx  Surg Hx  Fam Hx  Soc Hx      Reviewed and updated as needed this visit by Provider  Tobacco  Allergies  Meds  Med Hx  Surg Hx  Fam Hx  Soc Hx        ROS:  Constitutional, HEENT, cardiovascular, pulmonary, gi and gu systems are negative, except as otherwise noted.    OBJECTIVE:     /90  Pulse 69  Temp 97.3  F (36.3  C) (Tympanic)  Ht 5' 6.5\" (1.689 m)  Wt 161 lb (73 kg)  LMP 01/13/2012  SpO2 100%  BMI 25.6 kg/m2  Body mass index is 25.6 kg/(m^2).  GENERAL: healthy, alert and no distress  EYES: Eyes grossly normal to inspection, PERRL and conjunctivae and sclerae normal  HENT: ear canals and TM's normal, nose and mouth without ulcers or lesions  NECK: no adenopathy, no asymmetry, masses, or scars and thyroid normal to palpation  RESP: lungs clear to auscultation - no rales, rhonchi or wheezes  CV: regular rate and rhythm, normal S1 S2, no S3 or S4, no murmur, click or rub, no peripheral edema and peripheral pulses strong  ABDOMEN: soft, nontender, no hepatosplenomegaly, no masses and bowel sounds normal  MS: no gross musculoskeletal defects noted, no edema  SKIN: no suspicious lesions or rashes  NEURO: Normal strength and tone, mentation intact and speech normal  PSYCH: mentation appears normal, affect normal/bright  LYMPH: normal ant/post cervical, supraclavicular nodes    Diagnostic Test Results:  No results found for this or any previous visit (from the past 24 hour(s)).    ASSESSMENT/PLAN:       BP Screening:   Last 3 BP Readings:    BP Readings from Last 3 Encounters:   11/05/18 138/90   10/30/18 134/88   03/28/18 112/82       The following was recommended to the patient:  Re-screen within 4 weeks and recommend lifestyle modifications  BMI:   Estimated body mass index is 25.6 kg/(m^2) as calculated from the following:    Height as of this encounter: 5' 6.5\" (1.689 m).    Weight as of this " encounter: 161 lb (73 kg).   Weight management plan: Discussed healthy diet and exercise guidelines and patient will follow up in 12 months in clinic to re-evaluate.      1. Persistent insomnia  Wrote for Ambien but explained that this is for short term use only and not to be used for more than 3 consecutive nights.  Reviewed sleep hygiene in detail and encouraged her to seek counseling for her family issues as this seems to be driving her insomnia.  - zolpidem (AMBIEN) 5 MG tablet; Take 1 tablet (5 mg) by mouth nightly as needed for sleep  Dispense: 30 tablet; Refill: 1    2. Acquired hypothyroidism  Rechecking TSH today.  Patient feels OK on current 75 mcg Synthroid daily except for feeling tired.  - TSH with free T4 reflex    3. Elevated BP without diagnosis of hypertension  Recheck BP 3--4 weeks, low salt diet and regular exercise encouraged.      See Patient Instructions    TRUNG Gutiérrez [facility]

## 2018-11-05 NOTE — PATIENT INSTRUCTIONS
At [facility], we strive to deliver an exceptional experience to you, every time we see you.  If you receive a survey in the mail, please send us back your thoughts. We really do value your feedback.    Your care team:                            Family Medicine Internal Medicine   MD Rito Pepper MD Shantel Branch-Fleming, MD Katya Georgiev PA-C Megan Hill, APRN CNP    Joseph Henao, MD Pediatrics   Anthony Pereyra, MARCELA Paulson, MD Becky Singh APRN CNP   MD Inez Jeffers MD Deborah Mielke, MD Taya Cruz, APRN [facility]      Clinic hours: Monday - Thursday 7 am-7 pm; Fridays 7 am-5 pm.   Urgent care: Monday - Friday 11 am-9 pm; Saturday and Sunday 9 am-5 pm.  Pharmacy : Monday -Thursday 8 am-8 pm; Friday 8 am-6 pm; Saturday and Sunday 9 am-5 pm.     Clinic: [phone]   Pharmacy: [phone]        Insomnia  Insomnia is repeated difficulty going to sleep or staying asleep, or both. Whether you have insomnia is not defined by a specific amount of sleep. Different people need different amounts of sleep, and you may need more or less sleep at different times of your life.  There are 3 major types of insomnia:  short-term, chronic, and  other.   Short-term, or acute insomnia lasts less than 3 months.  The symptoms are temporary and can be linked directly to a stressor, such as the death of a loved one, financial problems, or a new physical problem.  Short-term insomnia stops when the stressor resolves or the person adapts to its presence.  Chronic insomnia occurs at least 3 times a week and lasts longer than 3 months.  Chronic insomnia can occur when either the cause of the sleeping problem is not clear, or the insomnia does not get better when the stressor is resolved. A number of other criteria are also used to make the diagnosis of chronic insomnia.    Other insomnia  is the third type of insomnia-related sleep disorders.  This  description applies to people who have problems getting to sleep or staying asleep, but do not meet all of the factors that describe either short-term or chronic insomnia.    Many things cause insomnia. Different people may have different causes. It can be from an underlying medical or psychological condition, or lifestyle. It can also be primary insomnia, which means no cause can be found.  Causes of insomnia include:    Chronic medical problems- heart disease, gastrointestinal problems, hormonal changes, breathing problems    Anxiety    Stress    Depression    Pain    Work schedule    Sleep apnea    Illegal drugs    Certain medicines  Many different medidcines can affect your sleep, such as stimulants, caffeine, alcohol, some decongestants, and diet pills. Other medicines may include some types of blood pressure pills, steroids, asthma medicines, antihistamines, antidepressants, seizure medicines and statins. Not all of these will affect your sleep, and they shouldn t be stopped without talking to your doctor.  Symptoms of insomnia can include:    Lying awake for long periods at night before falling asleep    Waking up several times during the night    Waking up early in the morning and not being able to get back to sleep    Feeling tired and not refreshed by sleep    Not being able to function properly during the day and finding it hard to concentrate    Irritability    Tiredness and fatigue during the day  Home care    Review your medicines with your doctor or pharmacist to find out if they can cause insomnia. Not all medicines will affect your sleep, but they shouldn't be stopped without reviewing them with your doctor. There may be serious side effects and consequences from suddenly stopping your medicines. Not taking them may cause strokes, heart attacks, and many other problems.    Caffeine, smoking and alcohol also affect sleep. Limit your daily use and do not use these before bedtime. Alcohol may make you  sleepy at first, but as its effects wear off, you may awaken a few hours later and have trouble returning to sleep.    Do not exercise, eat or drink large amounts of liquid within 2 hours of your bedtime.    Improve your sleep habits. Have a fixed bed and wake-up time. Try to keep noise, light and heat in your bedroom at a comfortable level. Try using earplugs or eyeshades if needed.     Avoid watching TV in bed.    If you do not fall asleep within 30 minutes, try to relax by reading or listening to soft music.    Limit daytime napping to one 30 minute period, early in the day.    Get regular exercise. Find other ways to lessen your stress level.    If a medicine was prescribed to help reset your sleep patterns, take it as directed. Sleeping pills are intended for short-term use, only. If taken for too long, the effect wears off while the risk of physical addiction and psychological dependence increases.  Sleep diary  If the cause isn t obvious and it is not improving, try keeping a  sleep diary  for a couple of weeks. Include in it:    The time you go to bed    How long it takes to fall asleep    How many times you wake up    What time you wake up    Your meal times and what you eat    What time you drink alcohol    Your exercise habits and times  Follow-up care  Follow up with your healthcare provider, or as advised. If X-rays or CT scans were done, you will be notified if there is a change in the reading, especially if it affects treatment.  Call 911  Call 911 if any of these occur:    Trouble breathing    Confusion or trouble waking    Fainting or loss of consciousness    Rapid heart rate    New chest, arm, shoulder, neck or upper back pain    Trouble with speech or vision, weakness of an arm or leg    Trouble walking or talking, loss of balance, numbness or weakness in one side of your body, facial droop  When to seek medical advice  Call your healthcare provider right away if any of these occur:    Extreme  restlessness or irritability    Confusion or hallucinations (seeing or hearing things that are not there)    Anxiety, depression    Several days without sleeping  Date Last Reviewed: 11/19/2015 2000-2017 [employer] [address] [place] [address], [place] [address]. All rights reserved. This information is not intended as a substitute for professional medical care. Always follow your healthcare professional's instructions.

## 2018-11-05 NOTE — LETTER
November 7, 2018      [place]  [address] [facility] [address]        Yan Saenz,     Your thyroid function test was normal for you. I wrote for more synthroid for you and you can pick it up at our pharmacy at your convenience.   Feel free to contact me with any questions or concerns.  Thank you for allowing me to participate in your care.         Resulted Orders   TSH with free T4 reflex   Result Value Ref Range    TSH 2.03 0.40 - 4.00 mU/L       If you have any questions or concerns, please call the clinic at the number listed above.       Sincerely,        TRUNG Gutiérrez CNP/TJ

## 2018-11-07 DIAGNOSIS — E03.4 HYPOTHYROIDISM DUE TO ACQUIRED ATROPHY OF THYROID: ICD-10-CM

## 2018-11-07 RX ORDER — LEVOTHYROXINE SODIUM 75 UG/1
75 TABLET ORAL DAILY
Qty: 90 TABLET | Refills: 3 | Status: SHIPPED | OUTPATIENT
Start: 2018-11-07 | End: 2019-03-28

## 2019-01-17 ENCOUNTER — OFFICE VISIT (OUTPATIENT)
Dept: DERMATOLOGY | Facility: CLINIC | Age: 59
End: 2019-01-17
Payer: COMMERCIAL

## 2019-01-17 DIAGNOSIS — L90.5 BURN SCAR: Primary | ICD-10-CM

## 2019-01-17 PROCEDURE — 99213 OFFICE O/P EST LOW 20 MIN: CPT | Performed by: DERMATOLOGY

## 2019-01-17 RX ORDER — HYDROQUINONE 40 MG/G
CREAM TOPICAL
Qty: 30 G | Refills: 1 | Status: SHIPPED | OUTPATIENT
Start: 2019-01-17 | End: 2019-02-12

## 2019-01-17 ASSESSMENT — PAIN SCALES - GENERAL: PAINLEVEL: NO PAIN (0)

## 2019-01-17 NOTE — NURSING NOTE
Carolina Baugh's goals for this visit include:   Chief Complaint   Patient presents with     Derm Problem     Carolina is returning to discuss malasma; no significant change noted since her last visit.       She requests these members of her care team be copied on today's visit information:     PCP: Donna Cruz    Referring Provider:  Tasha Magdaleno MD  [address] [place] [address] [address]  [place] [address]    [facility] 01/13/2012     Do you need any medication refills at today's visit? No  Ana Ellis LPN
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
## 2019-02-06 ENCOUNTER — MYC MEDICAL ADVICE (OUTPATIENT)
Dept: DERMATOLOGY | Facility: CLINIC | Age: 59
End: 2019-02-06

## 2019-02-06 DIAGNOSIS — L90.5 BURN SCAR: Primary | ICD-10-CM

## 2019-03-28 ENCOUNTER — OFFICE VISIT (OUTPATIENT)
Dept: FAMILY MEDICINE | Facility: CLINIC | Age: 59
End: 2019-03-28
Payer: COMMERCIAL

## 2019-03-28 VITALS
HEART RATE: 68 BPM | BODY MASS INDEX: 25.9 KG/M2 | WEIGHT: 162.9 LBS | DIASTOLIC BLOOD PRESSURE: 77 MMHG | OXYGEN SATURATION: 99 % | RESPIRATION RATE: 20 BRPM | SYSTOLIC BLOOD PRESSURE: 118 MMHG

## 2019-03-28 DIAGNOSIS — Z00.00 ROUTINE HISTORY AND PHYSICAL EXAMINATION OF ADULT: ICD-10-CM

## 2019-03-28 DIAGNOSIS — J30.1 CHRONIC SEASONAL ALLERGIC RHINITIS DUE TO POLLEN: ICD-10-CM

## 2019-03-28 DIAGNOSIS — K21.9 GASTROESOPHAGEAL REFLUX DISEASE WITHOUT ESOPHAGITIS: ICD-10-CM

## 2019-03-28 DIAGNOSIS — E03.4 HYPOTHYROIDISM DUE TO ACQUIRED ATROPHY OF THYROID: ICD-10-CM

## 2019-03-28 DIAGNOSIS — Z12.31 VISIT FOR SCREENING MAMMOGRAM: Primary | ICD-10-CM

## 2019-03-28 DIAGNOSIS — R09.81 NASAL CONGESTION: ICD-10-CM

## 2019-03-28 DIAGNOSIS — S49.91XA SHOULDER INJURY, RIGHT, INITIAL ENCOUNTER: ICD-10-CM

## 2019-03-28 DIAGNOSIS — G47.00 PERSISTENT INSOMNIA: ICD-10-CM

## 2019-03-28 LAB
ANION GAP SERPL CALCULATED.3IONS-SCNC: 4 MMOL/L (ref 3–14)
BASOPHILS # BLD AUTO: 0 10E9/L (ref 0–0.2)
BASOPHILS NFR BLD AUTO: 0.9 %
BUN SERPL-MCNC: 11 MG/DL (ref 7–30)
CALCIUM SERPL-MCNC: 9.4 MG/DL (ref 8.5–10.1)
CHLORIDE SERPL-SCNC: 105 MMOL/L (ref 94–109)
CHOLEST SERPL-MCNC: 263 MG/DL
CO2 SERPL-SCNC: 28 MMOL/L (ref 20–32)
CREAT SERPL-MCNC: 0.81 MG/DL (ref 0.52–1.04)
DIFFERENTIAL METHOD BLD: ABNORMAL
EOSINOPHIL # BLD AUTO: 0.3 10E9/L (ref 0–0.7)
EOSINOPHIL NFR BLD AUTO: 7.4 %
ERYTHROCYTE [DISTWIDTH] IN BLOOD BY AUTOMATED COUNT: 13 % (ref 10–15)
GFR SERPL CREATININE-BSD FRML MDRD: 79 ML/MIN/{1.73_M2}
GLUCOSE SERPL-MCNC: 94 MG/DL (ref 70–99)
HCT VFR BLD AUTO: 45.1 % (ref 35–47)
HDLC SERPL-MCNC: 70 MG/DL
HGB BLD-MCNC: 14.5 G/DL (ref 11.7–15.7)
IMM GRANULOCYTES # BLD: 0 10E9/L (ref 0–0.4)
IMM GRANULOCYTES NFR BLD: 0.3 %
LDLC SERPL CALC-MCNC: 174 MG/DL
LYMPHOCYTES # BLD AUTO: 1.7 10E9/L (ref 0.8–5.3)
LYMPHOCYTES NFR BLD AUTO: 49.3 %
MCH RBC QN AUTO: 28.8 PG (ref 26.5–33)
MCHC RBC AUTO-ENTMCNC: 32.2 G/DL (ref 31.5–36.5)
MCV RBC AUTO: 90 FL (ref 78–100)
MONOCYTES # BLD AUTO: 0.3 10E9/L (ref 0–1.3)
MONOCYTES NFR BLD AUTO: 8.6 %
NEUTROPHILS # BLD AUTO: 1.2 10E9/L (ref 1.6–8.3)
NEUTROPHILS NFR BLD AUTO: 33.5 %
NONHDLC SERPL-MCNC: 193 MG/DL
NRBC # BLD AUTO: 0 10*3/UL
NRBC BLD AUTO-RTO: 0 /100
PLATELET # BLD AUTO: 240 10E9/L (ref 150–450)
POTASSIUM SERPL-SCNC: 4.2 MMOL/L (ref 3.4–5.3)
RBC # BLD AUTO: 5.04 10E12/L (ref 3.8–5.2)
SODIUM SERPL-SCNC: 138 MMOL/L (ref 133–144)
TRIGL SERPL-MCNC: 95 MG/DL
TSH SERPL DL<=0.005 MIU/L-ACNC: 3.56 MU/L (ref 0.4–4)
WBC # BLD AUTO: 3.5 10E9/L (ref 4–11)

## 2019-03-28 RX ORDER — FLUTICASONE PROPIONATE 50 MCG
2 SPRAY, SUSPENSION (ML) NASAL DAILY
Qty: 16 G | Refills: 3 | Status: SHIPPED | OUTPATIENT
Start: 2019-03-28 | End: 2020-02-03

## 2019-03-28 RX ORDER — ZOLPIDEM TARTRATE 5 MG/1
5 TABLET ORAL
Qty: 30 TABLET | Refills: 0 | Status: SHIPPED | OUTPATIENT
Start: 2019-03-28 | End: 2020-02-03

## 2019-03-28 RX ORDER — LEVOTHYROXINE SODIUM 75 UG/1
75 TABLET ORAL DAILY
Qty: 90 TABLET | Refills: 3 | Status: SHIPPED | OUTPATIENT
Start: 2019-03-28 | End: 2020-02-03

## 2019-03-28 ASSESSMENT — ENCOUNTER SYMPTOMS
MYALGIAS: 1
HOARSE VOICE: 1
INSOMNIA: 1
BACK PAIN: 1

## 2019-03-28 ASSESSMENT — PAIN SCALES - GENERAL: PAINLEVEL: NO PAIN (1)

## 2019-03-28 NOTE — PATIENT INSTRUCTIONS
[facility] Phone Number: [phone]   [facility] Medication Refill Request Information:  * Please contact your pharmacy regarding ANY request for medication refills.  ** [facility] Prescription Fax = [phone]  * Please allow 3 business days for routine medication refills.  * Please allow 5 business days for controlled substance medication refills.     [facility] Test Result notification information:  *You will be notified with in 7-10 days of your appointment day regarding the results of your test.  If you are on MyChart you will be notified as soon as the provider has reviewed the results and signed off on them.        [facility], [phone] (2nd Floor)      icy hot or biofreeze topic, salon pas for shoulder
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
## 2019-03-28 NOTE — NURSING NOTE
Chief Complaint   Patient presents with     Physical     annual physical     Refill Request     carolyn Galvan LPN 11:00 AM on 3/28/2019    Has been screened for HIV and was negative..Sherry Galvan LPN 11:01 AM on 3/28/2019

## 2019-06-19 ENCOUNTER — ANCILLARY PROCEDURE (OUTPATIENT)
Dept: MAMMOGRAPHY | Facility: CLINIC | Age: 59
End: 2019-06-19
Attending: FAMILY MEDICINE
Payer: COMMERCIAL

## 2019-06-19 DIAGNOSIS — Z12.31 VISIT FOR SCREENING MAMMOGRAM: ICD-10-CM

## 2019-06-19 PROCEDURE — 77067 SCR MAMMO BI INCL CAD: CPT

## 2019-06-19 PROCEDURE — 77063 BREAST TOMOSYNTHESIS BI: CPT

## 2019-07-02 ENCOUNTER — THERAPY VISIT (OUTPATIENT)
Dept: PHYSICAL THERAPY | Facility: CLINIC | Age: 59
End: 2019-07-02
Payer: COMMERCIAL

## 2019-07-02 DIAGNOSIS — M89.8X1 PAIN OF RIGHT SCAPULA: ICD-10-CM

## 2019-07-02 DIAGNOSIS — S49.91XA SHOULDER INJURY, RIGHT, INITIAL ENCOUNTER: ICD-10-CM

## 2019-07-02 PROCEDURE — 97035 APP MDLTY 1+ULTRASOUND EA 15: CPT | Mod: GP | Performed by: PHYSICAL THERAPIST

## 2019-07-02 PROCEDURE — 97140 MANUAL THERAPY 1/> REGIONS: CPT | Mod: GP | Performed by: PHYSICAL THERAPIST

## 2019-07-02 PROCEDURE — 97161 PT EVAL LOW COMPLEX 20 MIN: CPT | Mod: GP | Performed by: PHYSICAL THERAPIST

## 2019-07-02 NOTE — PROGRESS NOTES
[facility] Initial Evaluation  Subjective:  The history is provided by the patient. No  was used.   Carolina Baugh being seen for R shoulder pain.   Problem began 3/28/2019. Where condition occurred: in the community.Problem occurred: unknown cause; but might be related to a fall a few weeks prior  and reported as 6/10 on pain scale. General health as reported by patient is excellent. Pertinent medical history includes:  Thyroid problems.  Medical allergies: none.  Surgeries include:  None.  Current medications:  Thyroid medication.   Primary job tasks include:  Driving, repetitive tasks and prolonged sitting.  Pain is described as aching and sharp and is constant. Pain is worse in the P.M.. Since onset symptoms are unchanged. Imaging testing: none.     Patient is  for [employer]. Restrictions include:  Working in normal job without restrictions.    Barriers include:  None as reported by patient.  Red flags:  None as reported by patient.  Type of problem:  Right shoulder   Condition occurred with:  A fall. This is a new condition    Patient reports pain:  Posterior. Radiates to:  Upper arm and other (R side upper back). Associated symptoms:  Painful arc and loss of motion/stiffness. Symptoms are exacerbated by using arm behind back, using arm overhead, lifting and lying on extremity and relieved by rest and NSAID's.                      Objective:  Standing Alignment:    Cervical/Thoracic:  Forward head  Shoulder/UE:  Rounded shoulders                                  Cervical/Thoracic Evaluation  Cervical AROM: normal (pulling R side of neck with flexion and turning to R side)                   Cervical Palpation:      Tenderness present at Right:    Upper Trap; Levator and Erector Spinae               Shoulder Evaluation:  ROM:  AROM:    Flexion:  Left:  161    Right:  152  Extension: Right: 48  Abduction:  Left: 172   Right:   110                  Extension/Internal Rotation:  Left:  Bra line    Right:  Bra line          Strength:    Flexion: Left:5/5   Pain:    Right: 4/5     Pain:   Extension:  Left: 5/5    Pain:    Right: 5/5    Pain:  Abduction:  Left: 5/5  Pain:    Right: 4-/5      Pain:-  Adduction:  Right: 5/5     Pain:                                                       General     ROS    Assessment/Plan:    Patient is a 59 year old female with right side shoulder complaints.    Patient has the following significant findings with corresponding treatment plan.                Diagnosis 1:  R posterior Shoulder  Pain -  hot/cold therapy, US and manual therapy  Decreased ROM/flexibility - manual therapy and therapeutic exercise  Decreased joint mobility - manual therapy and therapeutic exercise  Decreased strength - therapeutic exercise and therapeutic activities  Impaired muscle performance - neuro re-education  Decreased function - therapeutic activities  Impaired posture - neuro re-education    Therapy Evaluation Codes:   1) History comprised of:   Personal factors that impact the plan of care:      Time since onset of symptoms.    Comorbidity factors that impact the plan of care are:      None.     Medications impacting care: Anti-inflammatory.  2) Examination of Body Systems comprised of:   Body structures and functions that impact the plan of care:      Shoulder.   Activity limitations that impact the plan of care are:      Cooking, Driving, Lifting, Sitting, Laying down and Reaching.  3) Clinical presentation characteristics are:   Stable/Uncomplicated.  4) Decision-Making    Low complexity using standardized patient assessment instrument and/or measureable assessment of functional outcome.  Cumulative Therapy Evaluation is: Low complexity.    Previous and current functional limitations:  (See Goal Flow Sheet for this information)    Short term and Long term goals: (See Goal Flow Sheet for this information)     Communication  ability:  Patient appears to be able to clearly communicate and understand verbal and written communication and follow directions correctly.  Treatment Explanation - The following has been discussed with the patient:   RX ordered/plan of care  Anticipated outcomes  Possible risks and side effects  This patient would benefit from PT intervention to resume normal activities.   Rehab potential is good.    Frequency:  1 X week, once daily  Duration:  for 8 weeks  Discharge Plan:  Achieve all LTG.  Independent in home treatment program.  Reach maximal therapeutic benefit.    Please refer to the daily flowsheet for treatment today, total treatment time and time spent performing 1:1 timed codes.

## 2019-07-12 ENCOUNTER — THERAPY VISIT (OUTPATIENT)
Dept: PHYSICAL THERAPY | Facility: CLINIC | Age: 59
End: 2019-07-12
Payer: COMMERCIAL

## 2019-07-12 DIAGNOSIS — M89.8X1 PAIN OF RIGHT SCAPULA: ICD-10-CM

## 2019-07-12 PROCEDURE — 97110 THERAPEUTIC EXERCISES: CPT | Mod: GP | Performed by: PHYSICAL THERAPIST

## 2019-07-12 PROCEDURE — 97140 MANUAL THERAPY 1/> REGIONS: CPT | Mod: GP | Performed by: PHYSICAL THERAPIST

## 2019-07-12 PROCEDURE — 97035 APP MDLTY 1+ULTRASOUND EA 15: CPT | Mod: GP | Performed by: PHYSICAL THERAPIST

## 2019-07-15 ENCOUNTER — ANCILLARY PROCEDURE (OUTPATIENT)
Dept: GENERAL RADIOLOGY | Facility: CLINIC | Age: 59
End: 2019-07-15
Attending: NURSE PRACTITIONER
Payer: COMMERCIAL

## 2019-07-15 ENCOUNTER — OFFICE VISIT (OUTPATIENT)
Dept: FAMILY MEDICINE | Facility: CLINIC | Age: 59
End: 2019-07-15
Payer: COMMERCIAL

## 2019-07-15 VITALS
DIASTOLIC BLOOD PRESSURE: 67 MMHG | BODY MASS INDEX: 26.68 KG/M2 | HEART RATE: 67 BPM | OXYGEN SATURATION: 99 % | RESPIRATION RATE: 16 BRPM | SYSTOLIC BLOOD PRESSURE: 129 MMHG | HEIGHT: 66 IN | TEMPERATURE: 98.5 F | WEIGHT: 166 LBS

## 2019-07-15 DIAGNOSIS — Z01.84 IMMUNITY STATUS TESTING: ICD-10-CM

## 2019-07-15 DIAGNOSIS — M25.511 ACUTE PAIN OF RIGHT SHOULDER: ICD-10-CM

## 2019-07-15 DIAGNOSIS — M25.511 ACUTE PAIN OF RIGHT SHOULDER: Primary | ICD-10-CM

## 2019-07-15 PROCEDURE — 86706 HEP B SURFACE ANTIBODY: CPT | Performed by: NURSE PRACTITIONER

## 2019-07-15 PROCEDURE — 73030 X-RAY EXAM OF SHOULDER: CPT | Mod: RT

## 2019-07-15 PROCEDURE — 86481 TB AG RESPONSE T-CELL SUSP: CPT | Performed by: NURSE PRACTITIONER

## 2019-07-15 PROCEDURE — 86787 VARICELLA-ZOSTER ANTIBODY: CPT | Performed by: NURSE PRACTITIONER

## 2019-07-15 PROCEDURE — 36415 COLL VENOUS BLD VENIPUNCTURE: CPT | Performed by: NURSE PRACTITIONER

## 2019-07-15 PROCEDURE — 99213 OFFICE O/P EST LOW 20 MIN: CPT | Performed by: NURSE PRACTITIONER

## 2019-07-15 ASSESSMENT — ASTHMA QUESTIONNAIRES
QUESTION_1 LAST FOUR WEEKS HOW MUCH OF THE TIME DID YOUR ASTHMA KEEP YOU FROM GETTING AS MUCH DONE AT WORK, SCHOOL OR AT HOME: NONE OF THE TIME
QUESTION_2 LAST FOUR WEEKS HOW OFTEN HAVE YOU HAD SHORTNESS OF BREATH: NOT AT ALL
QUESTION_4 LAST FOUR WEEKS HOW OFTEN HAVE YOU USED YOUR RESCUE INHALER OR NEBULIZER MEDICATION (SUCH AS ALBUTEROL): NOT AT ALL
ACT_TOTALSCORE: 25
QUESTION_3 LAST FOUR WEEKS HOW OFTEN DID YOUR ASTHMA SYMPTOMS (WHEEZING, COUGHING, SHORTNESS OF BREATH, CHEST TIGHTNESS OR PAIN) WAKE YOU UP AT NIGHT OR EARLIER THAN USUAL IN THE MORNING: NOT AT ALL
QUESTION_5 LAST FOUR WEEKS HOW WOULD YOU RATE YOUR ASTHMA CONTROL: COMPLETELY CONTROLLED

## 2019-07-15 ASSESSMENT — PAIN SCALES - GENERAL: PAINLEVEL: NO PAIN (0)

## 2019-07-15 ASSESSMENT — MIFFLIN-ST. JEOR: SCORE: 1339.72

## 2019-07-15 ASSESSMENT — PATIENT HEALTH QUESTIONNAIRE - PHQ9: SUM OF ALL RESPONSES TO PHQ QUESTIONS 1-9: 1

## 2019-07-15 NOTE — PROGRESS NOTES
Stanton Baugh is a 59 year old female who presents to clinic today for the following health issues:    HPI   Joint Pain    Onset: 6-7 months ago    Description:   Location: right shoulder  Character: Sharp and Dull ache    Intensity: moderate, severe    Progression of Symptoms: worse    Accompanying Signs & Symptoms:  Other symptoms: numbness    History:   Previous similar pain: no       Precipitating factors:   Trauma or overuse: YES- fall    Alleviating factors:  Improved by: Ibuprofen    Therapies Tried and outcome: Ibuprofen and Physical Therapy: temporary relief    Fell in the winter. Didn't feel bad so didn't come in. Now with pain with some movements such as reaching into cabinet, closes from dryer. Sleeping on right makes it painful. Restarted physical therapy couple weeks ago. No improved yet. No numbness, tingling or weakness.     Update Immunization   needs vaccines/titers for work. New job as .        Patient Active Problem List   Diagnosis     Mild persistent asthma     Encounter for screening colonoscopy     CARDIOVASCULAR SCREENING; LDL GOAL LESS THAN 160     Sinusitis, chronic     Post-menopausal     Hypothyroidism     Mantoux: positive     Advance care planning     Cervicalgia     Episodic tension-type headache, not intractable     Elevated BP without diagnosis of hypertension     Pain of right scapula     Past Surgical History:   Procedure Laterality Date     BIOPSY BREAST Right      ENT SURGERY         Social History     Tobacco Use     Smoking status: Never Smoker     Smokeless tobacco: Never Used     Tobacco comment: exposure to second hand smoke   Substance Use Topics     Alcohol use: No     Family History   Problem Relation Age of Onset     Asthma Mother      Thyroid Disease Sister      Respiratory Sister         sinus, 2 sisters     Cancer No family hx of      Diabetes No family hx of      Hypertension No family hx of      Cerebrovascular Disease No family hx of   "    Glaucoma No family hx of      Macular Degeneration No family hx of      Melanoma No family hx of      Skin Cancer No family hx of          Current Outpatient Medications   Medication Sig Dispense Refill     fexofenadine (ALLEGRA) 180 MG tablet 1 tablet daily. for allergy symptoms.       fluocin-hydroquinone-tretinoin 0.01-4-0.05 % CREA Apply a thin layer to dark areas on the face for up to 8 weeks in a row, take 8 weeks off then repeat as needed 30 g 1     fluticasone (FLONASE) 50 MCG/ACT nasal spray Spray 2 sprays into both nostrils daily 16 g 3     levothyroxine (SYNTHROID/LEVOTHROID) 75 MCG tablet Take 1 tablet (75 mcg) by mouth daily 90 tablet 3     MULTIVITAMIN OR Take 1 tablet by mouth daily.       Omega-3 Fatty Acids (FISH OIL PO) Take 1 tablet by mouth daily as needed.       Polyvinyl Alcohol-Povidone (REFRESH OP) Apply to eye as needed       ranitidine (ZANTAC) 300 MG tablet Take 1 tablet (300 mg) by mouth At Bedtime 90 tablet 3     Adapalene (DIFFERIN EX)        zolpidem (AMBIEN) 5 MG tablet Take 1 tablet (5 mg) by mouth nightly as needed for sleep (Patient not taking: Reported on 7/15/2019) 30 tablet 0     No Known Allergies  BP Readings from Last 3 Encounters:   07/15/19 129/67   03/28/19 118/77   11/05/18 138/90    Wt Readings from Last 3 Encounters:   07/15/19 75.3 kg (166 lb)   03/28/19 73.9 kg (162 lb 14.4 oz)   11/05/18 73 kg (161 lb)                      Reviewed and updated as needed this visit by Provider  Tobacco  Allergies  Meds  Problems  Med Hx  Surg Hx  Fam Hx         Review of Systems   ROS COMP: Constitutional, HEENT, cardiovascular, pulmonary, gi and gu systems are negative, except as otherwise noted.      Objective    /67 (BP Location: Right arm, Patient Position: Chair, Cuff Size: Adult Large)   Pulse 67   Temp 98.5  F (36.9  C) (Oral)   Resp 16   Ht 1.676 m (5' 6\")   Wt 75.3 kg (166 lb)   LMP 01/13/2012 (Approximate)   SpO2 99%   Breastfeeding? No   BMI 26.79 " "kg/m    Body mass index is 26.79 kg/m .  Physical Exam   GENERAL: healthy, alert and no distress  RESP: lungs clear to auscultation - no rales, rhonchi or wheezes  CV: regular rate and rhythm, normal S1 S2, no S3 or S4, no murmur, click or rub, no peripheral edema and peripheral pulses strong  MS: no gross musculoskeletal defects noted, no edema  SKIN: no suspicious lesions or rashes  PSYCH: mentation appears normal, affect normal/bright    Diagnostic Test Results:  Xray - pending          Assessment & Plan     1. Acute pain of right shoulder  Continue physical therapy. Supportive cares discussed. If not improving with physical therapy alone after a bit longer, will refer to orthopedics.  - XR Shoulder Right G/E 3 Views; Future    2. Immunity status testing  As below.  - Quantiferon TB Gold Plus  - Varicella Zoster Virus Antibody IgG  - Hepatitis B Surface Antibody     BMI:   Estimated body mass index is 26.79 kg/m  as calculated from the following:    Height as of this encounter: 1.676 m (5' 6\").    Weight as of this encounter: 75.3 kg (166 lb).           See Patient Instructions    Return in about 2 weeks (around 7/29/2019), or if symptoms worsen or fail to improve.     Return precautions discussed, including when to seek urgent/emergent care.    Patient verbalizes understanding and agrees with plan of care. Patient stable for discharge.      TRUNG Cintron [facility]      "

## 2019-07-15 NOTE — PATIENT INSTRUCTIONS
At [facility], we strive to deliver an exceptional experience to you, every time we see you.  If you receive a survey in the mail, please send us back your thoughts. We really do value your feedback.    Based on your medical history, these are the current health maintenance/preventive care services that you are due for (some may have been done at this visit.)  Health Maintenance Due   Topic Date Due     HIV SCREENING  01/01/1974     ZOSTER IMMUNIZATION (1 of 2) 01/01/2009     ASTHMA ACTION PLAN  03/30/2018     ASTHMA CONTROL TEST  04/30/2019         Suggested websites for health information:  Www.[address].SprayCool : Up to date and easily searchable information on multiple topics.  Www.[place].gov : medication info, interactive tutorials, watch real surgeries online  Www.familydoctor.org : good info from the Academy of Family Physicians  Www.cdc.gov : public health info, travel advisories, epidemics (H1N1)  Www.aap.org : children's health info, normal development, vaccinations  Www.health.[facility].[place].us : [place] dept of health, public health issues in [place], N1N1    Your care team:                            Family Medicine Internal Medicine   MD Rito Pepper MD Shantel Branch-Fleming, MD Katya Georgiev PA-C Nam Ho, MD Pediatrics   MARCELA Cobian, MD Inez Gay CNP, MD Deborah Mielke, MD Kim Thein, APRN CNP      Clinic hours: Monday - Thursday 7 am-7 pm; Fridays 7 am-5 pm.   Urgent care: Monday - Friday 11 am-9 pm; Saturday and Sunday 9 am-5 pm.  Pharmacy : Monday -Thursday 8 am-8 pm; Friday 8 am-6 pm; Saturday and Sunday 9 am-5 pm.     Clinic: [phone]   Pharmacy: [phone]

## 2019-07-16 ENCOUNTER — THERAPY VISIT (OUTPATIENT)
Dept: PHYSICAL THERAPY | Facility: CLINIC | Age: 59
End: 2019-07-16
Payer: COMMERCIAL

## 2019-07-16 DIAGNOSIS — M89.8X1 PAIN OF RIGHT SCAPULA: ICD-10-CM

## 2019-07-16 LAB — HBV SURFACE AB SERPL IA-ACNC: 331.52 M[IU]/ML

## 2019-07-16 PROCEDURE — 97110 THERAPEUTIC EXERCISES: CPT | Mod: GP | Performed by: PHYSICAL THERAPIST

## 2019-07-16 PROCEDURE — 97035 APP MDLTY 1+ULTRASOUND EA 15: CPT | Mod: GP | Performed by: PHYSICAL THERAPIST

## 2019-07-16 PROCEDURE — 97140 MANUAL THERAPY 1/> REGIONS: CPT | Mod: GP | Performed by: PHYSICAL THERAPIST

## 2019-07-16 ASSESSMENT — ASTHMA QUESTIONNAIRES: ACT_TOTALSCORE: 25

## 2019-07-16 NOTE — RESULT ENCOUNTER NOTE
Badria,  Your x-ray results were normal. Please let us know if you have any questions.  Thank you for allowing us to participate in your care.  TRUNG Cintron CNP

## 2019-07-17 LAB
GAMMA INTERFERON BACKGROUND BLD IA-ACNC: 0.06 IU/ML
M TB IFN-G BLD-IMP: NEGATIVE
M TB IFN-G CD4+ BCKGRND COR BLD-ACNC: >10 IU/ML
MITOGEN IGNF BCKGRD COR BLD-ACNC: 0 IU/ML
MITOGEN IGNF BCKGRD COR BLD-ACNC: 0.01 IU/ML
VZV IGG SER QL IA: 3.4 AI (ref 0–0.8)

## 2019-07-17 NOTE — RESULT ENCOUNTER NOTE
Badria,  Your lab results were normal - quantiferon negative - no latent or active TB. You are immune to hepatitis B and varicella.  TRUNG Cintron CNP

## 2019-07-31 ENCOUNTER — THERAPY VISIT (OUTPATIENT)
Dept: PHYSICAL THERAPY | Facility: CLINIC | Age: 59
End: 2019-07-31
Payer: COMMERCIAL

## 2019-07-31 ENCOUNTER — TELEPHONE (OUTPATIENT)
Dept: FAMILY MEDICINE | Facility: CLINIC | Age: 59
End: 2019-07-31

## 2019-07-31 DIAGNOSIS — M89.8X1 PAIN OF RIGHT SCAPULA: ICD-10-CM

## 2019-07-31 PROCEDURE — 97110 THERAPEUTIC EXERCISES: CPT | Mod: GP | Performed by: PHYSICAL THERAPIST

## 2019-07-31 PROCEDURE — 97140 MANUAL THERAPY 1/> REGIONS: CPT | Mod: GP | Performed by: PHYSICAL THERAPIST

## 2019-07-31 PROCEDURE — 97035 APP MDLTY 1+ULTRASOUND EA 15: CPT | Mod: GP | Performed by: PHYSICAL THERAPIST

## 2019-07-31 NOTE — TELEPHONE ENCOUNTER
Reason for Call:  Other prescription    Detailed comments: patient came in on 7/15/2019 and had a Quantiferon TB Gold and a hepatitis B and she printed out from My Chart this information to give to her employer and they told her that is was not done right. Patient not sure what else she needs to do.    Phone Number Patient can be reached at: Cell number on file:    Telephone Information:   Mobile [phone]       Best Time: any    Can we leave a detailed message on this number? YES    Call taken on 7/31/2019 at 3:41 PM by Iliana Sandoval

## 2019-08-01 NOTE — TELEPHONE ENCOUNTER
email received from patient, looks like patient need to give them a copy of her hep B immunization and the TB gold test.     This writer attempted to contact patient on 08/01/19      Reason for call information requested by patient's employer and unable to leave message. Mailbox is full.      If patient calls back:   1st floor [facility] Care Team (MA/TC) called. Inform patient that someone from the team will contact them, document that pt called and route to care team.         Jocelyne Monsivais MA

## 2019-08-01 NOTE — TELEPHONE ENCOUNTER
Called and spoke with patient to clarify message. Patient states she received an email from her employer that there are two missing items needed. Hard to understand patient over the phone, I asked if she can drop off a copy of the email or fwd the email to me, so I can see what her employer need. My work email given to patient, Will wait for her email.    Jocelyne Monsivais MA

## 2019-08-02 NOTE — TELEPHONE ENCOUNTER
Faxed over pt's TB and Hep B labs as well as her Immunization records to [phone].    Mark Velasco MA on 8/2/2019 at 12:21 PM

## 2019-09-29 ENCOUNTER — HEALTH MAINTENANCE LETTER (OUTPATIENT)
Age: 59
End: 2019-09-29

## 2019-10-23 ENCOUNTER — ALLIED HEALTH/NURSE VISIT (OUTPATIENT)
Dept: NURSING | Facility: CLINIC | Age: 59
End: 2019-10-23
Payer: COMMERCIAL

## 2019-10-23 DIAGNOSIS — Z23 NEED FOR PROPHYLACTIC VACCINATION AND INOCULATION AGAINST INFLUENZA: Primary | ICD-10-CM

## 2019-10-23 PROCEDURE — 99207 ZZC NO CHARGE NURSE ONLY: CPT

## 2019-10-23 PROCEDURE — 90682 RIV4 VACC RECOMBINANT DNA IM: CPT

## 2019-10-23 PROCEDURE — G0008 ADMIN INFLUENZA VIRUS VAC: HCPCS

## 2019-11-12 PROBLEM — M89.8X1 PAIN OF RIGHT SCAPULA: Status: RESOLVED | Noted: 2019-07-02 | Resolved: 2019-11-12

## 2019-11-12 NOTE — PROGRESS NOTES
Discharge Note    Progress reporting period is from initial evaluation date (please see noted date below) to Jul 31, 2019.  Linked Episodes   Type: Episode: Status: Noted: Resolved: Last update: Updated by:   PHYSICAL THERAPY [id number] Active 7/2/2019 7/31/2019 11:50 AM Lola Pulido PT      Comments:       Carolina cancelled last follow up appointment scheduled and did not make any additional appointments.  Please see information below for last relevant information on current status.  Patient seen for 4 visits.    SUBJECTIVE  Subjective changes noted by patient:  Pt stated the pain along the shoulder blade area is better; pain still outer R shoulder; difficulty with sleeping on the R shoulder and reaching out to the side  .  Current pain level is 3/10.     Previous pain level was  6/10.   Changes in function:  Yes (See Goal flowsheet attached for changes in current functional level)  Adverse reaction to treatment or activity: None    OBJECTIVE  Changes noted in objective findings: R shoulder AROM is WNL;  (+) test for impingement     ASSESSMENT/PLAN  Diagnosis: R shoulder pain   Updated problem list and treatment plan:   Pain - HEP  STG/LTGs have been met or progress has been made towards goals:  Yes, please see goal flowsheet for most current information  Assessment of Progress: current status is unknown.    Last current status:     Self Management Plans:  HEP  I have re-evaluated this patient and find that the nature, scope, duration and intensity of the therapy is appropriate for the medical condition of the patient.  Carolina continues to require the following intervention to meet STG and LTG's:  HEP.    Recommendations:  Discharge with current home program.  Patient to follow up with MD as needed.    Please refer to the daily flowsheet for treatment today, total treatment time and time spent performing 1:1 timed codes.

## 2019-12-16 ENCOUNTER — TELEPHONE (OUTPATIENT)
Dept: FAMILY MEDICINE | Facility: CLINIC | Age: 59
End: 2019-12-16

## 2019-12-16 ENCOUNTER — MYC MEDICAL ADVICE (OUTPATIENT)
Dept: FAMILY MEDICINE | Facility: CLINIC | Age: 59
End: 2019-12-16

## 2019-12-16 DIAGNOSIS — K21.9 GASTROESOPHAGEAL REFLUX DISEASE WITHOUT ESOPHAGITIS: Primary | ICD-10-CM

## 2019-12-16 RX ORDER — NIZATIDINE 150 MG/1
150 CAPSULE ORAL 2 TIMES DAILY PRN
Qty: 60 CAPSULE | Refills: 3 | Status: SHIPPED | OUTPATIENT
Start: 2019-12-16 | End: 2020-02-03

## 2019-12-16 RX ORDER — FAMOTIDINE 40 MG/1
40 TABLET, FILM COATED ORAL
Qty: 90 TABLET | Refills: 3 | Status: SHIPPED | OUTPATIENT
Start: 2019-12-16 | End: 2019-12-16

## 2019-12-16 NOTE — TELEPHONE ENCOUNTER
Mildreda chart reviewed today for refill request.    Diagnoses and all orders for this visit:    Gastroesophageal reflux disease without esophagitis  -     famotidine (PEPCID) 40 MG tablet; Take 1 tablet (40 mg) by mouth nightly as needed for heartburn    Ranitidine recall please call her as substitute needed, stop ranitidine   Teto Mesa MD

## 2019-12-16 NOTE — TELEPHONE ENCOUNTER
Hello,    We received a prescription for Famotidine 40 mg. That medication is on backorder. Would you be able to send an alternative?        Thank You,  Felipe Mcclain [place]

## 2019-12-16 NOTE — TELEPHONE ENCOUNTER
Fax received from [facility] that alternative is needed for Ranitidine due to recall:    ranitidine (ZANTAC) 300 MG tablet 90 tablet 3 3/28/2019  No   Sig - Route: Take 1 tablet (300 mg) by mouth At Bedtime      Routed to provider who last saw patient at [facility].    Lorrie Medina RN on 12/16/2019 at 8:23 AM

## 2019-12-16 NOTE — TELEPHONE ENCOUNTER
Voice message was attempted, but mailbox was full.  My Chart message was sent letting patient know Pepcid Rx was sent to pharmacy due to Ranitidine recall.    Lorrie Medina RN on 12/16/2019 at 11:55 AM

## 2019-12-16 NOTE — TELEPHONE ENCOUNTER
Nizatidine available but All ranitidine recalled and pharmacy does not have famotidine.  Badria chart reviewed today for medication change.    Diagnoses and all orders for this visit:    Gastroesophageal reflux disease without esophagitis  -     nizatidine (AXID) 150 MG capsule; Take 1 capsule (150 mg) by mouth 2 times daily as needed for heartburn    #60 3 RF  .please call her to schedule follow-up.  Teto Mesa MD

## 2019-12-17 NOTE — TELEPHONE ENCOUNTER
Called and left message requesting patient to call back to schedule with Dr. Mesa. Also asked patient if she is seeing another provider as it appears that her PCP is at [place]. If patient PCP is Dr. Mesa, patient need a follow up appointment for her medication. Left number to call back for scheduling.

## 2020-01-30 ENCOUNTER — PRE VISIT (OUTPATIENT)
Dept: FAMILY MEDICINE | Facility: CLINIC | Age: 60
End: 2020-01-30

## 2020-01-30 NOTE — TELEPHONE ENCOUNTER
[facility]  Dept: [phone]     Patient: Carolina Baugh   : 1960  MRN: [id number]  Encounter: 20       Pre Visit Assessment    Health Maintenance Due   Topic Date Due     HIV SCREENING  1974     ZOSTER IMMUNIZATION (1 of 2) 2009     ASTHMA ACTION PLAN  2018     PHQ-2  2020     ASTHMA CONTROL TEST  01/15/2020     Chart Reviewed for Labs needed/Health Maintenance: No   If labs needed, orders placed:  No,   Lab appointment scheduled:  N/A    Notes:  Patient confirmed they will attend appointment:  No  Appointment rescheduled?  No    Was not able to get a hold of patient for fastin labs.     Jocelynn Yoder CMA at 4:28 PM on 2020
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
## 2020-02-03 ENCOUNTER — OFFICE VISIT (OUTPATIENT)
Dept: FAMILY MEDICINE | Facility: CLINIC | Age: 60
End: 2020-02-03
Payer: COMMERCIAL

## 2020-02-03 VITALS
HEART RATE: 65 BPM | SYSTOLIC BLOOD PRESSURE: 128 MMHG | DIASTOLIC BLOOD PRESSURE: 81 MMHG | OXYGEN SATURATION: 99 % | WEIGHT: 168.4 LBS | BODY MASS INDEX: 27.18 KG/M2

## 2020-02-03 DIAGNOSIS — J30.1 CHRONIC SEASONAL ALLERGIC RHINITIS DUE TO POLLEN: ICD-10-CM

## 2020-02-03 DIAGNOSIS — E03.4 HYPOTHYROIDISM DUE TO ACQUIRED ATROPHY OF THYROID: ICD-10-CM

## 2020-02-03 DIAGNOSIS — Z12.31 VISIT FOR SCREENING MAMMOGRAM: ICD-10-CM

## 2020-02-03 DIAGNOSIS — G47.00 PERSISTENT INSOMNIA: ICD-10-CM

## 2020-02-03 DIAGNOSIS — E78.5 HYPERLIPIDEMIA LDL GOAL <130: ICD-10-CM

## 2020-02-03 DIAGNOSIS — R09.81 NASAL CONGESTION: ICD-10-CM

## 2020-02-03 DIAGNOSIS — Z11.4 ENCOUNTER FOR SCREENING FOR HIV: ICD-10-CM

## 2020-02-03 DIAGNOSIS — E03.9 ACQUIRED HYPOTHYROIDISM: ICD-10-CM

## 2020-02-03 DIAGNOSIS — Z00.00 ROUTINE HISTORY AND PHYSICAL EXAMINATION OF ADULT: Primary | ICD-10-CM

## 2020-02-03 DIAGNOSIS — K21.9 GASTROESOPHAGEAL REFLUX DISEASE WITHOUT ESOPHAGITIS: ICD-10-CM

## 2020-02-03 LAB
ALBUMIN SERPL-MCNC: 3.3 G/DL (ref 3.4–5)
ALP SERPL-CCNC: 74 U/L (ref 40–150)
ALT SERPL W P-5'-P-CCNC: 18 U/L (ref 0–50)
ANION GAP SERPL CALCULATED.3IONS-SCNC: 5 MMOL/L (ref 3–14)
AST SERPL W P-5'-P-CCNC: 17 U/L (ref 0–45)
BASOPHILS # BLD AUTO: 0 10E9/L (ref 0–0.2)
BASOPHILS NFR BLD AUTO: 1 %
BILIRUB SERPL-MCNC: 0.3 MG/DL (ref 0.2–1.3)
BUN SERPL-MCNC: 10 MG/DL (ref 7–30)
CALCIUM SERPL-MCNC: 9 MG/DL (ref 8.5–10.1)
CHLORIDE SERPL-SCNC: 105 MMOL/L (ref 94–109)
CHOLEST SERPL-MCNC: 237 MG/DL
CO2 SERPL-SCNC: 28 MMOL/L (ref 20–32)
CREAT SERPL-MCNC: 0.75 MG/DL (ref 0.52–1.04)
DIFFERENTIAL METHOD BLD: ABNORMAL
EOSINOPHIL # BLD AUTO: 0.2 10E9/L (ref 0–0.7)
EOSINOPHIL NFR BLD AUTO: 4.8 %
ERYTHROCYTE [DISTWIDTH] IN BLOOD BY AUTOMATED COUNT: 13.5 % (ref 10–15)
GFR SERPL CREATININE-BSD FRML MDRD: 87 ML/MIN/{1.73_M2}
GLUCOSE SERPL-MCNC: 87 MG/DL (ref 70–99)
HCT VFR BLD AUTO: 43.3 % (ref 35–47)
HDLC SERPL-MCNC: 67 MG/DL
HGB BLD-MCNC: 13.6 G/DL (ref 11.7–15.7)
IMM GRANULOCYTES # BLD: 0 10E9/L (ref 0–0.4)
IMM GRANULOCYTES NFR BLD: 0 %
LDLC SERPL CALC-MCNC: 144 MG/DL
LYMPHOCYTES # BLD AUTO: 1.8 10E9/L (ref 0.8–5.3)
LYMPHOCYTES NFR BLD AUTO: 46 %
MCH RBC QN AUTO: 28.2 PG (ref 26.5–33)
MCHC RBC AUTO-ENTMCNC: 31.4 G/DL (ref 31.5–36.5)
MCV RBC AUTO: 90 FL (ref 78–100)
MONOCYTES # BLD AUTO: 0.4 10E9/L (ref 0–1.3)
MONOCYTES NFR BLD AUTO: 9.8 %
NEUTROPHILS # BLD AUTO: 1.5 10E9/L (ref 1.6–8.3)
NEUTROPHILS NFR BLD AUTO: 38.4 %
NONHDLC SERPL-MCNC: 170 MG/DL
NRBC # BLD AUTO: 0 10*3/UL
NRBC BLD AUTO-RTO: 0 /100
PLATELET # BLD AUTO: 234 10E9/L (ref 150–450)
POTASSIUM SERPL-SCNC: 3.9 MMOL/L (ref 3.4–5.3)
PROT SERPL-MCNC: 7.3 G/DL (ref 6.8–8.8)
RBC # BLD AUTO: 4.82 10E12/L (ref 3.8–5.2)
SODIUM SERPL-SCNC: 138 MMOL/L (ref 133–144)
TRIGL SERPL-MCNC: 132 MG/DL
TSH SERPL DL<=0.005 MIU/L-ACNC: 2.37 MU/L (ref 0.4–4)
WBC # BLD AUTO: 4 10E9/L (ref 4–11)

## 2020-02-03 RX ORDER — ZOLPIDEM TARTRATE 5 MG/1
5 TABLET ORAL
Qty: 30 TABLET | Refills: 0 | Status: SHIPPED | OUTPATIENT
Start: 2020-02-03 | End: 2020-06-26

## 2020-02-03 RX ORDER — LEVOTHYROXINE SODIUM 75 UG/1
75 TABLET ORAL DAILY
Qty: 90 TABLET | Refills: 3 | Status: SHIPPED | OUTPATIENT
Start: 2020-02-03 | End: 2021-03-22

## 2020-02-03 RX ORDER — FLUTICASONE PROPIONATE 50 MCG
2 SPRAY, SUSPENSION (ML) NASAL DAILY
Qty: 16 G | Refills: 11 | Status: SHIPPED | OUTPATIENT
Start: 2020-02-03 | End: 2021-03-22

## 2020-02-03 RX ORDER — NIZATIDINE 150 MG/1
150 CAPSULE ORAL 2 TIMES DAILY PRN
Qty: 60 CAPSULE | Refills: 6 | Status: SHIPPED | OUTPATIENT
Start: 2020-02-03 | End: 2021-03-22

## 2020-02-03 ASSESSMENT — ENCOUNTER SYMPTOMS
NERVOUS/ANXIOUS: 0
PANIC: 0
INSOMNIA: 1
DEPRESSION: 0
DECREASED CONCENTRATION: 0

## 2020-02-03 ASSESSMENT — PAIN SCALES - GENERAL: PAINLEVEL: NO PAIN (0)

## 2020-02-03 NOTE — PATIENT INSTRUCTIONS
Primary Care Center Medication Refill Request Information:  * Please contact your pharmacy regarding ANY request for medication refills.  ** [facility] Prescription Fax = [phone]  * Please allow 3 business days for routine medication refills.  * Please allow 5 business days for controlled substance medication refills.     Primary Care Center Test Result notification information:  *You will be notified with in 7-10 days of your appointment day regarding the results of your test.  If you are on MyChart you will be notified as soon as the provider has reviewed the results and signed off on them.    Primary Care Center: [phone]     Your health care Provider has recommended that you receive the new Shingle vaccine called Shingrix to prevent shingles for ages 50 and above. Many private insurance and Medicare Part D plans cover Shingrix. However, not all insurance carriers cover the entire cost of the Shingrix vaccine if the vaccine is administered at your primary care clinic. The clinic cannot determine your insurance benefits.  Please call your insurance carrier prior. The vaccine comes in two doses. Your second dose should be 2-6 months from your first dose.       Prior to receiving the vaccine, we recommend that you call your insurance carrier and ask them the following questions:            1. Is there a cost difference if I receive the vaccine at my doctor's office or a pharmacy?          2. Does my insurance cover the Shingrix Vaccine and administration of the vaccine?          3. What is my co-pay or deductible for the vaccine?        Please call to schedule a Nurse-Visit only at [phone].  Nurse Visit hours are available Monday, Wednesday, and Friday from 9:00 AM-11:00 AM and 1:00 PM-3:00 PM.

## 2020-02-03 NOTE — NURSING NOTE
Chief Complaint   Patient presents with     Physical     Pt comes in for physical exam      MAGDY Kirkpatrick at 12:51 PM sign on 2/3/2020

## 2020-02-04 LAB — HIV 1+2 AB+HIV1 P24 AG SERPL QL IA: NONREACTIVE

## 2020-02-04 RX ORDER — FAMOTIDINE 40 MG/1
TABLET, FILM COATED ORAL
Qty: 90 TABLET | Refills: 3 | Status: SHIPPED | OUTPATIENT
Start: 2020-02-04 | End: 2021-03-22

## 2020-02-04 ASSESSMENT — ASTHMA QUESTIONNAIRES: ACT_TOTALSCORE: 25

## 2020-02-05 ENCOUNTER — OFFICE VISIT (OUTPATIENT)
Dept: OPHTHALMOLOGY | Facility: CLINIC | Age: 60
End: 2020-02-05
Payer: COMMERCIAL

## 2020-02-05 DIAGNOSIS — H02.88B MEIBOMIAN GLAND DYSFUNCTION (MGD) OF UPPER AND LOWER LIDS OF BOTH EYES: Primary | ICD-10-CM

## 2020-02-05 DIAGNOSIS — H02.88A MEIBOMIAN GLAND DYSFUNCTION (MGD) OF UPPER AND LOWER LIDS OF BOTH EYES: Primary | ICD-10-CM

## 2020-02-05 ASSESSMENT — VISUAL ACUITY
METHOD: SNELLEN - LINEAR
OD_SC+: -3
METHOD_MR_RETINOSCOPY: 1
OS_SC: 20/40
OD_SC: 20/20

## 2020-02-05 ASSESSMENT — TONOMETRY
IOP_METHOD: ICARE
OD_IOP_MMHG: 17
OS_IOP_MMHG: 16

## 2020-02-05 ASSESSMENT — CUP TO DISC RATIO
OD_RATIO: 0.2
OS_RATIO: 0.2

## 2020-02-05 ASSESSMENT — REFRACTION_MANIFEST
OS_AXIS: 145
OS_CYLINDER: +0.75
OS_SPHERE: +1.25

## 2020-02-05 ASSESSMENT — EXTERNAL EXAM - RIGHT EYE: OD_EXAM: NORMAL

## 2020-02-05 ASSESSMENT — CONF VISUAL FIELD
OS_NORMAL: 1
OD_NORMAL: 1
METHOD: COUNTING FINGERS

## 2020-02-05 ASSESSMENT — EXTERNAL EXAM - LEFT EYE: OS_EXAM: NORMAL

## 2020-02-05 ASSESSMENT — SLIT LAMP EXAM - LIDS
COMMENTS: 2+ MGD
COMMENTS: 2+ MGD

## 2020-02-06 NOTE — PROGRESS NOTES
"History  HPI     Eye Pain Left Eye       In left eye.  Characterized as burning.  Pain was noted as 6/10.  Occurring constantly.  Duration of 1 week.  Associated symptoms include tearing (sometimes per pt) and discharge (\"sticky\" per pt).  Negative for photophobia, swelling, redness and blurred vision.  Treatments tried include artificial tears.  Response to treatment was no improvement.              Comments     Patient notes that she is using AT BID OU. She states when her eyes would start to feel this way she would use warm compresses and it would relieve symptoms, she states that it is not helping her symptoms at all for her current symptoms.     Priya Cunha COT February 5, 2020 6:02 PM            Last edited by Nayeli Cunha on 2/5/2020  6:03 PM. (History)          Assessment/Plan  (H02.88A,  H02.88B) Meibomian gland dysfunction (MGD) of upper and lower lids of both eyes  (primary encounter diagnosis)  Comment: Symptomatic with pain, stickiness, and tearing  Plan:  Educated patient on condition and clinical findings. Increase warm compresses to twice each day for ten minutes and recommended artificial tears four times each day both eyes. Monitor at follow-up in 2 weeks.    Decreased vision appears to be secondary to refractive error and dryness.    Return to clinic in 2 weeks for follow-up.    Complete documentation of historical and exam elements from today's encounter can  be found in the full encounter summary report (not reduplicated in this progress  note). I personally obtained the chief complaint(s) and history of present illness. I  confirmed and edited as necessary the review of systems, past medical/surgical  history, family history, social history, and examination findings as documented by  others; and I examined the patient myself. I personally reviewed the relevant tests,  images, and reports as documented above. I formulated and edited as necessary the  assessment and plan and discussed the " findings and management plan with the  patient and family.    Michael Gomez OD, FAAO

## 2020-02-06 NOTE — NURSING NOTE
"Chief Complaints and History of Present Illnesses   Patient presents with     Eye Pain Left Eye     Chief Complaint(s) and History of Present Illness(es)     Eye Pain Left Eye     Laterality: In left eye    Quality: burning    Pain scale: 6/10    Frequency: constantly    Duration: 1 week    Associated symptoms: tearing (sometimes per pt) and discharge (\"sticky\" per pt).  Negative for photophobia, swelling, redness and blurred vision    Treatments tried: artificial tears    Response to treatment: no improvement              Comments     Patient notes that she is using AT BID OU. She states when her eyes would start to feel this way she would use warm compresses and it would relieve symptoms, she states that it is not helping her symptoms at all for her current symptoms.     Priya SNIDER February 5, 2020 6:02 PM                  "

## 2020-03-04 ENCOUNTER — OFFICE VISIT (OUTPATIENT)
Dept: OPHTHALMOLOGY | Facility: CLINIC | Age: 60
End: 2020-03-04
Payer: COMMERCIAL

## 2020-03-04 DIAGNOSIS — H52.4 PRESBYOPIA: ICD-10-CM

## 2020-03-04 DIAGNOSIS — H25.093 AGE-RELATED INCIPIENT CATARACT OF BOTH EYES: ICD-10-CM

## 2020-03-04 DIAGNOSIS — H02.88B MEIBOMIAN GLAND DYSFUNCTION (MGD) OF UPPER AND LOWER LIDS OF BOTH EYES: Primary | ICD-10-CM

## 2020-03-04 DIAGNOSIS — H02.88A MEIBOMIAN GLAND DYSFUNCTION (MGD) OF UPPER AND LOWER LIDS OF BOTH EYES: Primary | ICD-10-CM

## 2020-03-04 ASSESSMENT — CONF VISUAL FIELD
OD_NORMAL: 1
OS_NORMAL: 1
METHOD: COUNTING FINGERS

## 2020-03-04 ASSESSMENT — REFRACTION_MANIFEST
OS_ADD: +2.25
OS_AXIS: 160
OD_CYLINDER: +0.75
OD_AXIS: 010
OD_CYLINDER: SPHERE
OD_SPHERE: +0.50
OS_SPHERE: PLANO
OD_ADD: +2.25
OD_SPHERE: +0.25
OS_CYLINDER: +1.00
OS_AXIS: 133
OS_CYLINDER: +0.75
OS_SPHERE: +0.50

## 2020-03-04 ASSESSMENT — SLIT LAMP EXAM - LIDS
COMMENTS: 1+ MGD
COMMENTS: 1+ MGD

## 2020-03-04 ASSESSMENT — VISUAL ACUITY
OS_SC: 20/25
METHOD: SNELLEN - LINEAR
OS_SC+: -2
OD_SC: 20/30
METHOD_MR_RETINOSCOPY: 1

## 2020-03-04 ASSESSMENT — CUP TO DISC RATIO
OS_RATIO: 0.2
OD_RATIO: 0.3

## 2020-03-04 ASSESSMENT — TONOMETRY
OD_IOP_MMHG: 14
IOP_METHOD: ICARE
OS_IOP_MMHG: 15

## 2020-03-04 ASSESSMENT — EXTERNAL EXAM - RIGHT EYE: OD_EXAM: NORMAL

## 2020-03-04 ASSESSMENT — EXTERNAL EXAM - LEFT EYE: OS_EXAM: NORMAL

## 2020-03-04 NOTE — PROGRESS NOTES
History  HPI     Dry Eye Syndrome Follow Up     In both eyes.  Associated symptoms include burning and itching (LE per pt).  Negative for irritation, mattering, discharge, red eyes, eye pain, dryness and foreign body sensation.  Treatments tried include artificial tears.  Response to treatment was mild improvement.  Pain was noted as 0/10.              Comments     Patient notes that she is using AT 1-2x daily, she notes that her symptoms have improved but still has some burning    Priya SNIDER March 4, 2020 1:49 PM            Last edited by Nayeli Cunha on 3/4/2020  1:50 PM. (History)          Assessment/Plan  (H02.88A,  H02.88B) Meibomian gland dysfunction (MGD) of upper and lower lids of both eyes  (primary encounter diagnosis)  Comment: Symptoms improving  Plan:  Educated patient on condition and clinical findings. Recommended continue use of warm compresses and artificial tears twice each day. Monitor annually, or sooner if symptoms worsen.    (H52.4) Presbyopia  Comment: Presbyopia both eyes, minimal distance refractive error, first time bifocals  Plan: REFRACTION         Educated patient on condition and clinical findings. Dispensed spectacle prescription for full time wear or continue use of reading glasses. Monitor annually.    (H25.093) Age-related incipient cataract of both eyes  Comment: Not visually significant  Plan:  No treatment indicated at this time. Monitor annually.    Return to clinic in 1 year for comprehensive eye exam.    Complete documentation of historical and exam elements from today's encounter can  be found in the full encounter summary report (not reduplicated in this progress  note). I personally obtained the chief complaint(s) and history of present illness. I  confirmed and edited as necessary the review of systems, past medical/surgical  history, family history, social history, and examination findings as documented by  others; and I examined the patient myself. I personally  reviewed the relevant tests,  images, and reports as documented above. I formulated and edited as necessary the  assessment and plan and discussed the findings and management plan with the  patient and family.    Michael Gomez OD, FAAO

## 2020-03-04 NOTE — NURSING NOTE
Chief Complaints and History of Present Illnesses   Patient presents with     Dry Eye Syndrome Follow Up     Chief Complaint(s) and History of Present Illness(es)     Dry Eye Syndrome Follow Up     Laterality: both eyes    Associated symptoms: burning and itching (LE per pt).  Negative for irritation, mattering, discharge, red eyes, eye pain, dryness and foreign body sensation    Treatments tried: artificial tears    Response to treatment: mild improvement    Pain scale: 0/10              Comments     Patient notes that she is using AT 1-2x daily, she notes that her symptoms have improved but still has some burning    Priya Stake COT March 4, 2020 1:49 PM

## 2020-06-22 ENCOUNTER — ANCILLARY PROCEDURE (OUTPATIENT)
Dept: MAMMOGRAPHY | Facility: CLINIC | Age: 60
End: 2020-06-22
Attending: FAMILY MEDICINE
Payer: COMMERCIAL

## 2020-06-22 DIAGNOSIS — Z12.31 VISIT FOR SCREENING MAMMOGRAM: ICD-10-CM

## 2020-06-25 DIAGNOSIS — G47.00 PERSISTENT INSOMNIA: ICD-10-CM

## 2020-06-26 RX ORDER — ZOLPIDEM TARTRATE 5 MG/1
TABLET ORAL
Qty: 30 TABLET | Refills: 0 | Status: SHIPPED | OUTPATIENT
Start: 2020-06-26 | End: 2021-02-01

## 2020-06-26 NOTE — TELEPHONE ENCOUNTER
Patient Requested  zolpidem (AMBIEN) 5 MG tablet  Last Filled  02/03/2020  Last Office Visit  02/03/2020  Next Office Visit  Nothing scheduled   Checked  06/26/2020    DX: Persistent insomnia     Pharmacy:     EB CURTIS CMA at 10:11 AM on 6/26/2020.

## 2020-08-13 ENCOUNTER — VIRTUAL VISIT (OUTPATIENT)
Dept: FAMILY MEDICINE | Facility: CLINIC | Age: 60
End: 2020-08-13
Payer: COMMERCIAL

## 2020-08-13 ENCOUNTER — NURSE TRIAGE (OUTPATIENT)
Dept: NURSING | Facility: CLINIC | Age: 60
End: 2020-08-13

## 2020-08-13 DIAGNOSIS — Z20.822 SUSPECTED COVID-19 VIRUS INFECTION: Primary | ICD-10-CM

## 2020-08-13 PROCEDURE — 99213 OFFICE O/P EST LOW 20 MIN: CPT | Mod: TEL | Performed by: INTERNAL MEDICINE

## 2020-08-13 RX ORDER — FAMOTIDINE 20 MG/1
TABLET, FILM COATED ORAL
COMMUNITY
Start: 2020-07-08 | End: 2020-08-17

## 2020-08-13 ASSESSMENT — PAIN SCALES - GENERAL: PAINLEVEL: NO PAIN (0)

## 2020-08-13 NOTE — PATIENT INSTRUCTIONS
At [facility], we strive to deliver an exceptional experience to you, every time we see you. If you receive a survey, please complete it as we do value your feedback.  If you have MyChart, you can expect to receive results automatically within 24 hours of their completion.  Your provider will send a note interpreting your results as well.   If you do not have MyChart, you should receive your results in about a week by mail.    Your care team:     Family Medicine   MARCELA Alvarenga APRN CNP S. Matthew Hockett, MD Pamela Kolacz, MD Angela Wermerskirchen, MD    Internal Medicine  Boyd Crow MD     Clinic hours: Monday - Wednesday 7 am-7 pm   Thursdays and Fridays 7 am-5 pm.     [facility]: Monday - Friday 11 am-9 pm,   Saturday and Sunday 9 am-5 pm.     [facility]: Monday - Thursday 8 am - 7 pm; Friday 8 am - 6 pm    Clinic: [phone]   [facility]: [phone]     Use [email or web address] for 24/7 diagnosis and treatment of dozens of conditions.

## 2020-08-13 NOTE — PROGRESS NOTES
"Carolina Baugh is a 60 year old female who is being evaluated via a billable telephone visit.      The patient has been notified of following:     \"This telephone visit will be conducted via a call between you and your physician/provider. We have found that certain health care needs can be provided without the need for a physical exam.  This service lets us provide the care you need with a short phone conversation.  If a prescription is necessary we can send it directly to your pharmacy.  If lab work is needed we can place an order for that and you can then stop by our lab to have the test done at a later time.    Telephone visits are billed at different rates depending on your insurance coverage. During this emergency period, for some insurers they may be billed the same as an in-person visit.  Please reach out to your insurance provider with any questions.    If during the course of the call the physician/provider feels a telephone visit is not appropriate, you will not be charged for this service.\"    Patient has given verbal consent for Telephone visit?  Yes    What phone number would you like to be contacted at? [phone]    How would you like to obtain your AVS? Baldo    Subjective     Carolina Baugh is a 60 year old female who presents via phone visit today for the following health issues:    HPI      Concern for COVID-19  About how many days ago did these symptoms start? A few days go   Is this your first visit for this illness? Yes  In the 14 days before your symptoms started, have you had close contact with someone with COVID-19 (Coronavirus)? Yes, I have been in contact with someone who has COVID-19/Coronavirus (confirmed by lab test).  Do you have a fever or chills? Yes, I felt feverish or had chills  Are you having new or worsening difficulty breathing? No  Do you have new or worsening cough? Yes, it's a dry cough.   Have you had any new or unexplained body aches? YES    Have you experienced any of " the following NEW symptoms?    Headache: No    Sore throat: No    Loss of taste or smell: No    Chest pain: YES    Diarrhea: No    Rash: No  What treatments have you tried? Tylenol   Who do you live with?  and daughter   Are you, or a household member, a healthcare worker or a ? YES  Do you live in a nursing home, group home, or shelter? No  Do you have a way to get food/medications if quarantined? Yes, I have a friend or family member who can help me.                  Patient Active Problem List   Diagnosis     Mild persistent asthma     Encounter for screening colonoscopy     CARDIOVASCULAR SCREENING; LDL GOAL LESS THAN 160     Sinusitis, chronic     Post-menopausal     Hypothyroidism     Mantoux: positive     Advance care planning     Cervicalgia     Episodic tension-type headache, not intractable     Elevated BP without diagnosis of hypertension     Past Surgical History:   Procedure Laterality Date     BIOPSY BREAST Right      ENT SURGERY         Social History     Tobacco Use     Smoking status: Never Smoker     Smokeless tobacco: Never Used     Tobacco comment: exposure to second hand smoke   Substance Use Topics     Alcohol use: No     Family History   Problem Relation Age of Onset     Asthma Mother      Thyroid Disease Sister      Respiratory Sister         sinus, 2 sisters     Cancer No family hx of      Diabetes No family hx of      Hypertension No family hx of      Cerebrovascular Disease No family hx of      Glaucoma No family hx of      Macular Degeneration No family hx of      Melanoma No family hx of      Skin Cancer No family hx of            Reviewed and updated as needed this visit by Provider         Review of Systems   Constitutional, HEENT, cardiovascular, pulmonary, gi and gu systems are negative, except as otherwise noted.       Objective          Vitals:  No vitals were obtained today due to virtual visit.    healthy, alert and no distress  PSYCH: Alert and oriented  times 3; coherent speech, normal   rate and volume, able to articulate logical thoughts, able   to abstract reason, no tangential thoughts, no hallucinations   or delusions  Her affect is normal  RESP: No cough, no audible wheezing, able to talk in full sentences  Remainder of exam unable to be completed due to telephone visits    Diagnostic Test Results:  Labs reviewed in Epic  none         Assessment/Plan:    Assessment & Plan     1. Suspected COVID-19 virus infection   is a healthcare worker  He was recently diagnosed with COVID  She is now having chills and Rigors  Muscle aches  No cough  Had sore throat  Does not feel well in herself  We need to rule out COVID  Advised about quarantine measures  - Symptomatic COVID-19 Virus (Coronavirus) by PCR; Future           Return in about 4 weeks (around 9/10/2020).    Boyd Crow MD  [facility]    Phone call duration:  6  minutes

## 2020-08-13 NOTE — TELEPHONE ENCOUNTER
Pt daughter called in states Pt had virtual visit today.  The caller states her mother has difficulty breathing.  Sometimes has wheezing.  The symptom is at rest.  Pt has asthma.  Has dizziness, runny nose and cough.  No fever.  Pt  is positive for covid-19.  The disposition is to go ED.  Pt do not want to go ED.  Care advice given per protocol.  The caller states she want the Dr how do virtual visit or care team to contact her as soon as possible to discuss Pt Covid-19 test.    Please advised.      Matt Edmonds [place] Nurse Advisor 8/13/2020 4:41 PM      Reason for Disposition    [1] MODERATE difficulty breathing (e.g., speaks in phrases, SOB even at rest, pulse 100-120) AND [2] NEW-onset or WORSE than normal    Additional Information    Negative: [1] Breathing stopped AND [2] hasn't returned    Negative: Choking on something    Negative: Severe difficulty breathing (e.g., struggling for each breath, speaks in single words)    Negative: Bluish (or gray) lips or face now    Negative: Difficult to awaken or acting confused (e.g., disoriented, slurred speech)    Negative: Passed out (i.e., lost consciousness, collapsed and was not responding)    Negative: Wheezing started suddenly after medicine, an allergic food or bee sting    Negative: Stridor    Negative: Slow, shallow and weak breathing    Negative: Sounds like a life-threatening emergency to the triager    Negative: Chest pain    Negative: [1] Wheezing (high pitched whistling sound) AND [2] previous asthma attacks or use of asthma medicines    Negative: [1] Difficulty breathing AND [2] only present when coughing    Negative: [1] Difficulty breathing AND [2] only from stuffy or runny nose    Protocols used: BREATHING DIFFICULTY-A-AH

## 2020-08-14 NOTE — TELEPHONE ENCOUNTER
Thanks  Looks like she is scheduled for Covid test at urgent care and the order cannot be cancelled now as it is already scheduled .  She doesn't have to turn up for the appointment ,unless there is a way we can route this to urgent care to cancel the appointment but I dobut it as this is done by central scheduling.

## 2020-08-14 NOTE — TELEPHONE ENCOUNTER
"Patient contacted. She said she went to [facility] to get the Covid test. Please cancel the Covid test for here. She denies any difficulties breathing. She says she is \"fine now.\" She has no further needs at this time.     Shayy Caldera RN  [facility]    "

## 2020-08-17 ENCOUNTER — ANCILLARY PROCEDURE (OUTPATIENT)
Dept: GENERAL RADIOLOGY | Facility: CLINIC | Age: 60
End: 2020-08-17
Attending: INTERNAL MEDICINE
Payer: COMMERCIAL

## 2020-08-17 ENCOUNTER — VIRTUAL VISIT (OUTPATIENT)
Dept: FAMILY MEDICINE | Facility: CLINIC | Age: 60
End: 2020-08-17
Payer: COMMERCIAL

## 2020-08-17 DIAGNOSIS — J20.9 ACUTE BRONCHITIS, UNSPECIFIED ORGANISM: ICD-10-CM

## 2020-08-17 DIAGNOSIS — J20.9 ACUTE BRONCHITIS, UNSPECIFIED ORGANISM: Primary | ICD-10-CM

## 2020-08-17 PROCEDURE — 71046 X-RAY EXAM CHEST 2 VIEWS: CPT

## 2020-08-17 PROCEDURE — 99213 OFFICE O/P EST LOW 20 MIN: CPT | Mod: TEL | Performed by: INTERNAL MEDICINE

## 2020-08-17 RX ORDER — BENZONATATE 100 MG/1
100 CAPSULE ORAL 3 TIMES DAILY PRN
Qty: 21 CAPSULE | Refills: 0 | Status: SHIPPED | OUTPATIENT
Start: 2020-08-17 | End: 2021-03-22

## 2020-08-17 NOTE — PATIENT INSTRUCTIONS
Patient Education     What Is Acute Bronchitis?  Acute bronchitis is when the airways in your lungs (bronchial tubes) becomered and swollen (inflamed). It is usually caused by a viral infection. But it can also occur because of a bacteria or allergen. Symptoms include a cough that produces yellow or greenish mucus and can last for days or sometimes weeks.  Lungs with bronchitis  Bronchitis often occurs with a cold or the flu virus. The airways become inflamed (red and swollen). There is a deep hacking cough from the extra mucus. Other symptoms may include:    Wheezing    Chest discomfort    Shortness of breath    Mild fever  A second infection, this time due to bacteria, may then occur. And airways irritated by allergens or smoke are more likely to get infected.  Making a diagnosis  A physical exam, health history, and certain tests help your healthcare provider make the diagnosis.  Health history  Your healthcare provider will ask you about previous illnesses and your current symptoms. You will also be asked about what medications you currently take, including your use of over-the-counter drugs, vitamins, herbs, and supplements.  The exam  Your provider listens to your chest for signs of congestion. He or she may also check your ears, nose, and throat.  Possible tests    A sputum test for bacteria. This requires a sample of mucus from your lungs.    A nasal or throat swab. This tests to see if you have a bacterial or viral infection.    A chest X-ray. This is done if your healthcare provider thinks you have pneumonia.    Tests to check for an underlying condition. Other tests may be done to check for things such as allergies, asthma, or COPD (chronic obstructive pulmonary disease). You may need to see a specialist for more lung function testing.    Treating a cough  The main treatment for bronchitis is easing symptoms. Avoiding smoke, allergens, and other things that trigger coughing can often help. If the  infection is bacterial, you may be given antibiotics. During the illness, it's important to get plenty of sleep. To ease symptoms:    Don t smoke. Also avoid secondhand smoke.    Use a humidifier. Or try breathing in steam from a hot shower. This may help loosen mucus.    Drink a lot of water and juice. They can soothe the throat and may help thin mucus.    Sit up or use extra pillows when in bed. This helps to lessen coughing and congestion.    Ask your provider about using medicine. Ask about using cough medicine, pain and fever medicine, or a decongestant.  Antibiotics  Most cases of bronchitis are caused by cold or flu viruses. They don t need antibiotics to treat them, even if your mucus is thick and green or yellow. Antibiotics don t treat viral illness and antibiotics have not been shown to have any benefit in cases of acute bronchitis. Taking antibiotics when they are not needed increases your risk of getting an infection later that is antibiotic-resistant. Antibiotics can also cause severe cases of diarrhea that require other antibiotics to treat.  It is important that you accept your healthcare provider's opinion to not use antibiotics. Your provider will prescribe antibiotics if the infection is caused by bacteria. If they are prescribed:    Take all of the medicine. Take the medicine until it is used up, even if symptoms have improved. If you don t, the bronchitis may come back.    Take the medicines as directed. For instance, some medicines should be taken with food.    Ask about side effects. Ask your provider or pharmacist what side effects are common, and what to do about them.  Follow-up care  You should see your provider again in 2 to 3 weeks. By this time, symptoms should have improved. An infection that lasts longer may mean you have a more serious problem.  Prevention    Avoid tobacco smoke. If you smoke, quit. Stay away from smoky places. Ask friends and family not to smoke around you, or in  your home or car.    Get checked for allergies.    Ask your provider about getting a yearly flu shot. Also ask about pneumococcal or pneumonia shots.    Wash your hands often. This helps reduce the chance of picking up viruses that cause colds and flu.    Contact your healthcare provider immediately if:    Symptoms worsen, or you have new symptoms    Breathing problems worsen    Symptoms don t get better within a week, or within 3 days of taking antibioticsCall 911 if you are:    wheezing    feeling lightheaded or dizzy    unable to talk    skin or nails looks blue or pale    Inside healthy lungs    Air travels in and out of the lungs through the airways. The linings of these airways produce sticky mucus. This mucus traps particles that enter the lungs. Tiny structures called cilia then sweep the particles out of the airways.     Healthy airway: Airways are normally open. Air moves in and out easily.      Healthy cilia: Tiny, hairlike cilia sweep mucus and particles up and out of the airways.        Inflamed airway: Inflammation and extra mucus narrow the airway, causing shortness of breath.      Impaired cilia: Extra mucus impairs cilia, causing congestion and wheezing. Smoking makes the problem worse.     Date Last Reviewed: 2/1/2017 2000-2019 [employer]. [address] [place] [address], [place] [address]. All rights reserved. This information is not intended as a substitute for professional medical care. Always follow your healthcare professional's instructions.

## 2020-08-17 NOTE — PROGRESS NOTES
"Carolina Baugh is a 60 year old female who is being evaluated via a billable telephone visit.      The patient has been notified of following:     \"This telephone visit will be conducted via a call between you and your physician/provider. We have found that certain health care needs can be provided without the need for a physical exam.  This service lets us provide the care you need with a short phone conversation.  If a prescription is necessary we can send it directly to your pharmacy.  If lab work is needed we can place an order for that and you can then stop by our lab to have the test done at a later time.    Telephone visits are billed at different rates depending on your insurance coverage. During this emergency period, for some insurers they may be billed the same as an in-person visit.  Please reach out to your insurance provider with any questions.    If during the course of the call the physician/provider feels a telephone visit is not appropriate, you will not be charged for this service.\"    Patient has given verbal consent for Telephone visit?  Yes    What phone number would you like to be contacted at? [phone]    How would you like to obtain your AVS? MyChart    Subjective     ACT SENT THRU RAPHAEL      Carolina Baugh is a 60 year old female who presents via phone visit today for the following health issues:  Initially had some fever and chills last week  COVID test was done but she has not heard the results  Test was done on Friday so I am assuming it is negative since if it is positive they will call her  Know her main complaint is cough  It is present all throughout the day  Denies any runny nose  Has some myalgias   and daughter tested positive for COVID  HPI    Acute Illness   Acute illness concerns: Cough  Onset: Friday    Fever: no    Chills/Sweats: YES    Headache (location?): no    Sinus Pressure:YES    Conjunctivitis:  no    Ear Pain: no    Rhinorrhea: no    Congestion: " no    Sore Throat: no     Cough: YES-non-productive    Wheeze: shallow breathing    Decreased Appetite: YES    Nausea: no    Vomiting: no    Diarrhea:  no    Dysuria/Freq.: no    Fatigue/Achiness: YES- fatigue    Sick/Strep Exposure: YES-  is positive for COVID     Therapies Tried and outcome: none            Patient Active Problem List   Diagnosis     Mild persistent asthma     Encounter for screening colonoscopy     CARDIOVASCULAR SCREENING; LDL GOAL LESS THAN 160     Sinusitis, chronic     Post-menopausal     Hypothyroidism     Mantoux: positive     Advance care planning     Cervicalgia     Episodic tension-type headache, not intractable     Elevated BP without diagnosis of hypertension     Past Surgical History:   Procedure Laterality Date     BIOPSY BREAST Right      ENT SURGERY         Social History     Tobacco Use     Smoking status: Never Smoker     Smokeless tobacco: Never Used     Tobacco comment: exposure to second hand smoke   Substance Use Topics     Alcohol use: No     Family History   Problem Relation Age of Onset     Asthma Mother      Thyroid Disease Sister      Respiratory Sister         sinus, 2 sisters     Cancer No family hx of      Diabetes No family hx of      Hypertension No family hx of      Cerebrovascular Disease No family hx of      Glaucoma No family hx of      Macular Degeneration No family hx of      Melanoma No family hx of      Skin Cancer No family hx of            Reviewed and updated as needed this visit by Provider         Review of Systems   Constitutional, HEENT, cardiovascular, pulmonary, gi and gu systems are negative, except as otherwise noted.       Objective          Vitals:  No vitals were obtained today due to virtual visit.    healthy, alert and no distress  PSYCH: Alert and oriented times 3; coherent speech, normal   rate and volume, able to articulate logical thoughts, able   to abstract reason, no tangential thoughts, no hallucinations   or delusions  Her  affect is normal  RESP: No cough, no audible wheezing, able to talk in full sentences  Remainder of exam unable to be completed due to telephone visits    Diagnostic Test Results:  Labs reviewed in Epic  none         Assessment/Plan:    Assessment & Plan     1. Acute bronchitis, unspecified organism  Discussed about the possible etiology  She was tested for COVID but results are currently not available  I am resuming these are negative since if they look to be positive she would not want a call as testing was done last Friday  Her  and daughter are positive for COVID  She is of the opinion that in the past when she had bronchitis like this comforting did not help her and only Augmentin helps her  - XR Chest 2 Views; Future  - amoxicillin-clavulanate (AUGMENTIN) 875-125 MG tablet; Take 1 tablet by mouth 2 times daily  Dispense: 14 tablet; Refill: 0  - benzonatate (TESSALON) 100 MG capsule; Take 1 capsule (100 mg) by mouth 3 times daily as needed for cough  Dispense: 21 capsule; Refill: 0           Return in about 4 weeks (around 9/14/2020).    Boyd Crow MD  [facility]    Phone call duration:  10 minutes

## 2020-08-20 ENCOUNTER — TELEPHONE (OUTPATIENT)
Dept: FAMILY MEDICINE | Facility: CLINIC | Age: 60
End: 2020-08-20

## 2020-08-20 DIAGNOSIS — J20.9 ACUTE BRONCHITIS, UNSPECIFIED ORGANISM: Primary | ICD-10-CM

## 2020-08-20 RX ORDER — GUAIFENESIN/DEXTROMETHORPHAN 100-10MG/5
5 SYRUP ORAL 3 TIMES DAILY PRN
Qty: 236 ML | Refills: 0 | Status: SHIPPED | OUTPATIENT
Start: 2020-08-20 | End: 2021-03-01

## 2020-08-20 NOTE — TELEPHONE ENCOUNTER
Call to do COVID test results of her daughter as she is the guardian  Patient told me that she still having cough and wanted something else for the cough medicine

## 2020-09-22 ENCOUNTER — IMMUNIZATION (OUTPATIENT)
Dept: NURSING | Facility: CLINIC | Age: 60
End: 2020-09-22
Payer: COMMERCIAL

## 2020-09-22 DIAGNOSIS — Z23 NEED FOR PROPHYLACTIC VACCINATION AND INOCULATION AGAINST INFLUENZA: Primary | ICD-10-CM

## 2020-09-22 PROCEDURE — 90471 IMMUNIZATION ADMIN: CPT

## 2020-09-22 PROCEDURE — 90682 RIV4 VACC RECOMBINANT DNA IM: CPT

## 2020-09-22 PROCEDURE — 99207 ZZC NO CHARGE NURSE ONLY: CPT

## 2021-01-29 DIAGNOSIS — G47.00 PERSISTENT INSOMNIA: ICD-10-CM

## 2021-01-29 NOTE — TELEPHONE ENCOUNTER
Patient Requested  zolpidem (AMBIEN) 5 MG tablet  Last Filled  06/26/2020  Last Office Visit  02/03/2020  Next Office Visit  Nothing scheduled   Checked  01/29/2021    DX: Persistent insomnia     Pharmacy:     EB [facility]TIS CMA at 8:50 AM on 1/29/2021.

## 2021-02-01 RX ORDER — ZOLPIDEM TARTRATE 5 MG/1
TABLET ORAL
Qty: 30 TABLET | Refills: 0 | Status: SHIPPED | OUTPATIENT
Start: 2021-02-01 | End: 2021-03-22

## 2021-02-23 ENCOUNTER — VIRTUAL VISIT (OUTPATIENT)
Dept: INTERNAL MEDICINE | Facility: CLINIC | Age: 61
End: 2021-02-23
Payer: COMMERCIAL

## 2021-02-23 DIAGNOSIS — M54.50 LOW BACK PAIN, UNSPECIFIED BACK PAIN LATERALITY, UNSPECIFIED CHRONICITY, UNSPECIFIED WHETHER SCIATICA PRESENT: Primary | ICD-10-CM

## 2021-02-23 PROCEDURE — 99214 OFFICE O/P EST MOD 30 MIN: CPT | Mod: 95 | Performed by: STUDENT IN AN ORGANIZED HEALTH CARE EDUCATION/TRAINING PROGRAM

## 2021-02-23 NOTE — PROGRESS NOTES
"Resident video visit documentation      This patient is being evaluated via a billable video visit as an alternative to an in-person visit.       The patient has been notified of following:     \"This video visit will be conducted via a call between you and your physician/provider. We have found that certain health care needs can be provided without the need for an in-person physical exam.  This service lets us provide the care you need with a video conversation.  If a prescription is necessary we can send it directly to your pharmacy.  If lab work is needed we can place an order for that and you can then stop by our lab to have the test done at a later time. If during the course of the call the physician/provider feels a video visit is not appropriate, you will not be charged for this service.\"       Patient has given verbal consent for the virtual video visit? Yes  Did patient initiate this virtual visit? Yes      Person spoken to:  Carolina Baugh    This was a synchronous virtual visit  Location of patient: home  Location of physician resident: in-home office  Location of teaching physician: clinic  Department name:  Medicine  Mode of communication:  Video Conference via [employer]    Time video initiated: 11.45 am    Time video ended:  12.15 pm  Total length of video visit: 30 min    Staffed with: Dr Linton      [facility]         HPI:       Carolina Baugh is a 61 year old woman with asthma, hypothyroidism, COVID19 in 08/2020, who presents for a virtual visit for back pain.    Pt had been generally healthy, but endorses dull, achy low back pain just superior to the hips that has ashley persistent for about 3 months. She says standing makes it worse (to 8/10 in intensity), and sitting for over 10 min also leads to pain exacerbation, as do \"bending and carrying things.\" She has avoided wearing heels because of this. The pain also limits her sleeping position, though it does not wake her up from sleep. She also " "endorses \"pressure with pain\" traveling down to her buttocks and her thighs (bilaterally, though R>L), especially when she is standing.The pain never travels below her knees. She has not noticed any impact from climbing stairs or from walking.   She has had low back pain episodes in the past, but they were typically limited in time, and developed as a result of a \"pulled muscle.\" She has tried ibuprofen and naproxen, with some symptomatic relief.  She denies any bowel/bladder incontinence, loss of sensation in her pelvis, weight loss or fevers or other constitutional symptoms. She denies neuromotor deficits. She denies a history of cancer. She denies ever having arthritis or articular disease. She denies ever fracturing a bone. She does endorse a fall last winter with impact to her right hip, that has been self resolved, and for which she did not seek medical help. She denies a history of IV drug use.    She has no other complaints at this time.    PMHx:  Past Medical History:   Diagnosis Date     Asthma      Dry eye syndrome      Encounter for screening colonoscopy 11/11/11    Normal, next 10 years 2021     Hyperthyroidism      MGD (meibomian gland disease)        PSHx:  Past Surgical History:   Procedure Laterality Date     BIOPSY BREAST Right      ENT SURGERY         FamHx:  Family History   Problem Relation Age of Onset     Asthma Mother      Thyroid Disease Sister      Respiratory Sister         sinus, 2 sisters     Cancer No family hx of      Diabetes No family hx of      Hypertension No family hx of      Cerebrovascular Disease No family hx of      Glaucoma No family hx of      Macular Degeneration No family hx of      Melanoma No family hx of      Skin Cancer No family hx of        Allergies:   No Known Allergies    Meds:    Current Outpatient Medications:      Adapalene (DIFFERIN EX), , Disp: , Rfl:      amoxicillin-clavulanate (AUGMENTIN) 875-125 MG tablet, Take 1 tablet by mouth 2 times daily, Disp: 14 " tablet, Rfl: 0     benzonatate (TESSALON) 100 MG capsule, Take 1 capsule (100 mg) by mouth 3 times daily as needed for cough, Disp: 21 capsule, Rfl: 0     famotidine (PEPCID) 40 MG tablet, Take 1 tablet (40 mg) by mouth nightly as needed for heartburn, Disp: 90 tablet, Rfl: 3     fexofenadine (ALLEGRA) 180 MG tablet, 1 tablet daily. for allergy symptoms., Disp: , Rfl:      fluticasone (FLONASE) 50 MCG/ACT nasal spray, Spray 2 sprays into both nostrils daily, Disp: 16 g, Rfl: 11     guaiFENesin-dextromethorphan (ROBITUSSIN DM) 100-10 MG/5ML syrup, Take 5 mLs by mouth 3 times daily as needed for cough, Disp: 236 mL, Rfl: 0     levothyroxine (SYNTHROID/LEVOTHROID) 75 MCG tablet, Take 1 tablet (75 mcg) by mouth daily, Disp: 90 tablet, Rfl: 3     MULTIVITAMIN OR, Take 1 tablet by mouth daily., Disp: , Rfl:      nizatidine (AXID) 150 MG capsule, Take 1 capsule (150 mg) by mouth 2 times daily as needed (gerd) (Patient not taking: Reported on 8/17/2020), Disp: 60 capsule, Rfl: 6     Omega-3 Fatty Acids (FISH OIL PO), Take 1 tablet by mouth daily as needed., Disp: , Rfl:      Polyvinyl Alcohol-Povidone (REFRESH OP), Apply to eye as needed, Disp: , Rfl:      zolpidem (AMBIEN) 5 MG tablet, TAKE ONE TABLET BY MOUTH EVERY EVENING AS NEEDED FOR SLEEP., Disp: 30 tablet, Rfl: 0    SocHx:  Social History     Socioeconomic History     Marital status:      Spouse name: Not on file     Number of children: Not on file     Years of education: Not on file     Highest education level: Not on file   Occupational History     Not on file   Social Needs     Financial resource strain: Not on file     Food insecurity     Worry: Not on file     Inability: Not on file     Transportation needs     Medical: Not on file     Non-medical: Not on file   Tobacco Use     Smoking status: Never Smoker     Smokeless tobacco: Never Used     Tobacco comment: exposure to second hand smoke   Substance and Sexual Activity     Alcohol use: No     Drug use:  No     Sexual activity: Yes     Partners: Male   Lifestyle     Physical activity     Days per week: Not on file     Minutes per session: Not on file     Stress: Not on file   Relationships     Social connections     Talks on phone: Not on file     Gets together: Not on file     Attends Yarsanism service: Not on file     Active member of club or organization: Not on file     Attends meetings of clubs or organizations: Not on file     Relationship status: Not on file     Intimate partner violence     Fear of current or ex partner: Not on file     Emotionally abused: Not on file     Physically abused: Not on file     Forced sexual activity: Not on file   Other Topics Concern     Parent/sibling w/ CABG, MI or angioplasty before 65F 55M? No   Social History Narrative     4 children one with special needs born 11/1997. 3 daughters and one son.    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       Problem, Medication and Allergy Lists were reviewed and are current.  Patient is an established patient of this clinic.         Review of Systems:     BEATRICE  I have personally reviewed and updated the complete ROS on the day of the visit.           Physical Exam:   LMP 01/13/2012 (Approximate)   There is no height or weight on file to calculate BMI.  Vitals were reviewed     Physical exam not performed during video visit.      Results:     Orders Only on 02/03/2020   Component Date Value Ref Range Status     HIV Antigen Antibody Combo 02/03/2020 Nonreactive  NR^Nonreactive     Final    HIV-1 p24 Ag & HIV-1/HIV-2 Ab Not Detected     WBC 02/03/2020 4.0  4.0 - 11.0 10e9/L Final     RBC Count 02/03/2020 4.82  3.8 - 5.2 10e12/L Final     Hemoglobin 02/03/2020 13.6  11.7 - 15.7 g/dL Final     Hematocrit 02/03/2020 43.3  35.0 - 47.0 % Final     MCV 02/03/2020 90  78 - 100 fl Final     MCH 02/03/2020 28.2  26.5 - 33.0 pg Final     MCHC 02/03/2020 31.4* 31.5 - 36.5 g/dL Final     RDW 02/03/2020 13.5  10.0 - 15.0 % Final      Platelet Count 02/03/2020 234  150 - 450 10e9/L Final     Diff Method 02/03/2020 Automated Method   Final     % Neutrophils 02/03/2020 38.4  % Final     % Lymphocytes 02/03/2020 46.0  % Final     % Monocytes 02/03/2020 9.8  % Final     % Eosinophils 02/03/2020 4.8  % Final     % Basophils 02/03/2020 1.0  % Final     % Immature Granulocytes 02/03/2020 0.0  % Final     Nucleated RBCs 02/03/2020 0  0 /100 Final     Absolute Neutrophil 02/03/2020 1.5* 1.6 - 8.3 10e9/L Final     Absolute Lymphocytes 02/03/2020 1.8  0.8 - 5.3 10e9/L Final     Absolute Monocytes 02/03/2020 0.4  0.0 - 1.3 10e9/L Final     Absolute Eosinophils 02/03/2020 0.2  0.0 - 0.7 10e9/L Final     Absolute Basophils 02/03/2020 0.0  0.0 - 0.2 10e9/L Final     Abs Immature Granulocytes 02/03/2020 0.0  0 - 0.4 10e9/L Final     Absolute Nucleated RBC 02/03/2020 0.0   Final     Sodium 02/03/2020 138  133 - 144 mmol/L Final     Potassium 02/03/2020 3.9  3.4 - 5.3 mmol/L Final     Chloride 02/03/2020 105  94 - 109 mmol/L Final     Carbon Dioxide 02/03/2020 28  20 - 32 mmol/L Final     Anion Gap 02/03/2020 5  3 - 14 mmol/L Final     Glucose 02/03/2020 87  70 - 99 mg/dL Final    Fasting specimen     Urea Nitrogen 02/03/2020 10  7 - 30 mg/dL Final     Creatinine 02/03/2020 0.75  0.52 - 1.04 mg/dL Final     GFR Estimate 02/03/2020 87  >60 mL/min/[1.73_m2] Final    Comment: Non  GFR Calc  Starting 12/18/2018, serum creatinine based estimated GFR (eGFR) will be   calculated using the Chronic Kidney Disease Epidemiology Collaboration   (CKD-EPI) equation.       GFR Estimate If Black 02/03/2020 >90  >60 mL/min/[1.73_m2] Final    Comment:  GFR Calc  Starting 12/18/2018, serum creatinine based estimated GFR (eGFR) will be   calculated using the Chronic Kidney Disease Epidemiology Collaboration   (CKD-EPI) equation.       Calcium 02/03/2020 9.0  8.5 - 10.1 mg/dL Final     Bilirubin Total 02/03/2020 0.3  0.2 - 1.3 mg/dL Final     Albumin  02/03/2020 3.3* 3.4 - 5.0 g/dL Final     Protein Total 02/03/2020 7.3  6.8 - 8.8 g/dL Final     Alkaline Phosphatase 02/03/2020 74  40 - 150 U/L Final     ALT 02/03/2020 18  0 - 50 U/L Final     AST 02/03/2020 17  0 - 45 U/L Final     TSH 02/03/2020 2.37  0.40 - 4.00 mU/L Final     Cholesterol 02/03/2020 237* <200 mg/dL Final    Desirable:       <200 mg/dl     Triglycerides 02/03/2020 132  <150 mg/dL Final    Fasting specimen     HDL Cholesterol 02/03/2020 67  >49 mg/dL Final     LDL Cholesterol Calculated 02/03/2020 144* <100 mg/dL Final    Comment: Above desirable:  100-129 mg/dl  Borderline High:  130-159 mg/dL  High:             160-189 mg/dL  Very high:       >189 mg/dl       Non HDL Cholesterol 02/03/2020 170* <130 mg/dL Final    Comment: Above Desirable:  130-159 mg/dl  Borderline high:  160-189 mg/dl  High:             190-219 mg/dl  Very high:       >219 mg/dl         Lab Results   Component Value Date    WBC 4.0 02/03/2020    HGB 13.6 02/03/2020    HCT 43.3 02/03/2020     02/03/2020     02/03/2020    POTASSIUM 3.9 02/03/2020    CHLORIDE 105 02/03/2020    CO2 28 02/03/2020    BUN 10 02/03/2020    CR 0.75 02/03/2020    GLC 87 02/03/2020    AST 17 02/03/2020    ALT 18 02/03/2020    ALKPHOS 74 02/03/2020    BILITOTAL 0.3 02/03/2020    INR 1.06 06/15/2011       Assessment and Plan       This is a generally healthy 61-year-old woman with asthma, hypothyroidism (on levothyroxine),and COVID-19 in 08/2020, who presents with complaints of non-traumatic low back pain, most consistent with radiculopathy vs compression fracture.    Low back pain  More often lumbosacral radiculopathy would present with unilateral rather than bilateral radiation of pain (though patient endorses R>L). Given her age group, would be concerned about compression fracture, but reassuringly calcium from 02/2020 within normal limits. Could also be superficial musculoskeletal problem (muscle sprain), though pain radiation and  duration would argue against this. Absence of red flag symptoms reassuring, and argues against cauda equina/spinal cord compression, metastatic cancer, spinal epidural abscess, or vertebral osteomyelitis. Also lower concern for spinal stenosis/pseudoclaudication.  -     PHYSICAL THERAPY REFERRAL; Future        -     Patient advised to return to the clinic for an in-person visit with physical exam if physical therapy does not alleviate symptoms      Options for treatment and follow-up care were reviewed with the patient. Carolina Baugh engaged in the decision making process and verbalized understanding of the options discussed and agreed with the final plan.    Jose Veliz MD PhD  Feb 23, 2021    Pt was seen and plan of care discussed with Dr Linton.     I personally reviewed the pertinent medical history and results.  I discussed the patient s diagnosis and treatment plan with the resident and the resident relayed our joint plan to the patient in synchrony. I agree with the information as documented with the following exceptions: none.  Kellie Lintno MD  Internal Medicine

## 2021-02-23 NOTE — PROGRESS NOTES
"  Carolina Baugh is a 61 year old female who is being evaluated via a billable video visit.      The patient has consented to a video visit and informed that video and telephone visits are being performed during the COVID-19 pandemic in order to mitigate the risk of an in office visit for appropriate candidates/issues. If during the course of the call the physician/provider feels a video visit is not appropriate, you will not be charged for this service. If the provider feels that they are unable to assess your concerns without an in person visit, you will be advised of this limitation and depending on the nature of the concern, advised to seek in person care if your provider feels you need urgent evaluation.      Subjective     Carolina Baugh is a 61 year old female who presents to clinic today for the following health issues:    No chief complaint on file.      HPI                                  Review of Systems   A detailed ROS was performed and was negative unless indicated in the HPI above.        Physical Exam   There were no vitals taken for this visit.  Wt Readings from Last 2 Encounters:   02/03/20 76.4 kg (168 lb 6.4 oz)   07/15/19 75.3 kg (166 lb)      Ht Readings from Last 2 Encounters:   07/15/19 1.676 m (5' 6\")   11/05/18 1.689 m (5' 6.5\")     GENERAL: healthy, alert and no distress  HEAD: Normocephalic, atraumatic  EYES: Eyes grossly normal to inspection, EOMI and conjunctivae and sclerae normal  RESP: Speaking in full sentences, unlabored, no audible wheezes or cough  SKIN: no suspicious lesions or rashes, no jaundice  NEURO: oriented, and speech normal  PSYCH: mentation appears normal, affect normal/bright          Diagnostic Test Results:  Labs reviewed in Epic            Assessment and Plan:  There are no diagnoses linked to this encounter.      Video-Visit Details    Type of service:  Video Visit    Video Start/End Time:     Originating Location (pt. Location): Home    Distant Location " (provider location):  [facility]     Mode of Communication:  Video Conference via  [employer] or  [employer]        Kellie Linton MD  Internal Medicine      >30 minutes spent today performing chart review, history and exam, documentation and further activities as noted above.

## 2021-03-14 ENCOUNTER — HEALTH MAINTENANCE LETTER (OUTPATIENT)
Age: 61
End: 2021-03-14

## 2021-03-18 ENCOUNTER — THERAPY VISIT (OUTPATIENT)
Dept: PHYSICAL THERAPY | Facility: CLINIC | Age: 61
End: 2021-03-18
Payer: COMMERCIAL

## 2021-03-18 DIAGNOSIS — M54.50 BILATERAL LOW BACK PAIN WITHOUT SCIATICA: ICD-10-CM

## 2021-03-18 DIAGNOSIS — M54.50 LOW BACK PAIN, UNSPECIFIED BACK PAIN LATERALITY, UNSPECIFIED CHRONICITY, UNSPECIFIED WHETHER SCIATICA PRESENT: ICD-10-CM

## 2021-03-18 PROCEDURE — 97110 THERAPEUTIC EXERCISES: CPT | Mod: GP | Performed by: PHYSICAL THERAPIST

## 2021-03-18 PROCEDURE — 97112 NEUROMUSCULAR REEDUCATION: CPT | Mod: GP | Performed by: PHYSICAL THERAPIST

## 2021-03-18 PROCEDURE — 97161 PT EVAL LOW COMPLEX 20 MIN: CPT | Mod: GP | Performed by: PHYSICAL THERAPIST

## 2021-03-18 NOTE — PROGRESS NOTES
Physical Therapy Initial Evaluation  Subjective:  The history is provided by the patient.   Patient Health History  Carolina Baugh being seen for LBP.     Problem began: 2/23/2021.   Problem occurred: insidious   Pain is reported as 7/10 on pain scale.  General health as reported by patient is good.  Pertinent medical history includes: thyroid problems.   Red flags:  None as reported by patient.  Medical allergies: none.   Surgeries include:  None.    Current medications:  Thyroid medication.    Current occupation is does not work outside the home.                     Therapist Generated HPI Evaluation  Problem details: Patient reports that she had insidious onset of lower back pain about 4 months ago. She has had similar pain in the past (3-4 times total) with the most recent about 4 years ago. The pain often started in the past when she bent over. MD order from 2-23-21..         Type of problem:  Lumbar.    This is a chronic condition.  Condition occurred with:  Insidious onset.  Where condition occurred: for unknown reasons.  Patient reports pain:  Central lumbar spine, lumbar spine right and lumbar spine left.  Pain is described as aching and cramping and is constant.  Pain radiates to:  Gluteals left and gluteals right. Pain is worse in the A.M..  Since onset symptoms are gradually improving.  Associated symptoms:  Loss of motion/stiffness. Symptoms are exacerbated by sitting, lifting, standing and bending (sit inna 10' with 5/10 pain, arise from sitting with 6-7/10 pain, 5/10 pain to don shoes/socks)  and relieved by NSAID's, rest and heat.    Previous treatment includes chiropractic. There was mild improvement following previous treatment.  Barriers include:  None as reported by patient.                        Objective:  System         Lumbar/SI Evaluation  ROM:    AROM Lumbar:   Flexion:          Fingertips to 2-3 inches above ankles and increase in LBP  Ext:                    Mod loss and increase   Side  Bend:        Left:     Right:   Rotation:           Left:     Right:   Side Glide:        Left:  Min loss    Right:  Min loss          Lumbar Myotomes:  normal            Lumbar DTR's:  normal      Cord Signs:  normal    Lumbar Dermtomes:  normal                Neural Tension/Mobility:  Lumbar:  Normal        Lumbar Palpation:    Tenderness present at Left:    Quadratus Lumborum and Erector Spinae  Tenderness present at Right: Quadratus Lumborum and Erector Spinae      Spinal Segmental Conclusions:     Level: Hypo noted at L1, L2, L3, S1, L5 and L4                                                     Shar Lumbar Evaluation    Posture:  Sitting: poor  Standing: fair  Lordosis: Reduced  Lateral Shift: no  Correction of Posture: better      Test Movements:  FIS: During: increases  After: no worse    Repeat FIS: During: increases  After: worse    EIS: During: increases  After: no worse    Repeat EIS: During: increases  After: no worse    NICOLAS: During: no effect  After: no effect    Repeat NICOLAS: During: increases  After: worse    EIL: During: no effect  After: no effect    Repeat EIL: During: decreases  After: better                                                   ROS    Assessment/Plan:    Patient is a 61 year old female with lumbar complaints.    Patient has the following significant findings with corresponding treatment plan.                Diagnosis 1:  Lumbar derangement  Pain -  US, manual therapy, self management, education, directional preference exercise and home program  Decreased ROM/flexibility - manual therapy, therapeutic exercise and home program  Decreased joint mobility - manual therapy, therapeutic exercise and home program  Impaired muscle performance - neuro re-education and home program  Decreased function - therapeutic activities and home program  Impaired posture - neuro re-education and home program    Therapy Evaluation Codes:   1) History comprised of:   Personal factors that impact the plan  of care:      Time since onset of symptoms.    Comorbidity factors that impact the plan of care are:      None.     Medications impacting care: None.  2) Examination of Body Systems comprised of:   Body structures and functions that impact the plan of care:      Lumbar spine.   Activity limitations that impact the plan of care are:      Bathing, Bending, Dressing, Lifting, Sitting and Stairs.  3) Clinical presentation characteristics are:   Stable/Uncomplicated.  4) Decision-Making    Low complexity using standardized patient assessment instrument and/or measureable assessment of functional outcome.  Cumulative Therapy Evaluation is: Low complexity.    Previous and current functional limitations:  (See Goal Flow Sheet for this information)    Short term and Long term goals: (See Goal Flow Sheet for this information)     Communication ability:  Patient appears to be able to clearly communicate and understand verbal and written communication and follow directions correctly.  Treatment Explanation - The following has been discussed with the patient:   RX ordered/plan of care  Anticipated outcomes  Possible risks and side effects  This patient would benefit from PT intervention to resume normal activities.   Rehab potential is good.    Frequency:  1 X week, once daily  Duration:  for 10 weeks  Discharge Plan:  Achieve all LTG.  Independent in home treatment program.  Reach maximal therapeutic benefit.    Please refer to the daily flowsheet for treatment today, total treatment time and time spent performing 1:1 timed codes.

## 2021-03-18 NOTE — LETTER
[facility]  [address]  [facility] [address]  [phone]    2021    Re: Carolina Baugh   :   1960  MRN:  [id number]   REFERRING PHYSICIAN:   Kellie VALDIVIA [facility]    Date of Initial Evaluation:  3/18/2021  Visits:  Rxs Used: 1  Reason for Referral:     Low back pain, unspecified back pain laterality, unspecified chronicity, unspecified whether sciatica present  Bilateral low back pain without sciatica    EVALUATION SUMMARY    Physical Therapy Initial Evaluation  Subjective:  The history is provided by the patient.     Patient Health History  Carolina Baugh being seen for LBP.     Problem began: 2021.   Problem occurred: insidious   Pain is reported as 7/10 on pain scale.  General health as reported by patient is good.  Pertinent medical history includes: thyroid problems.   Red flags: None as reported by patient.  Medical allergies: none.   Surgeries include: None.    Current medications: Thyroid medication.    Current occupation is: does not work outside the home.      Therapist Generated HPI Evaluation  Problem details: Patient reports that she had insidious onset of lower back pain about 4 months ago. She has had similar pain in the past (3-4 times total) with the most recent about 4 years ago. The pain often started in the past when she bent over. MD order from 21.         Type of problem: Lumbar.    This is a chronic condition.  Condition occurred with: Insidious onset.  Where condition occurred: for unknown reasons.  Patient reports pain: Central lumbar spine, lumbar spine right and lumbar spine left.  Pain is described as aching and cramping and is constant.  Pain radiates to: Gluteals left and gluteals right. Pain is worse in the A.M.  Since onset symptoms are gradually improving.  Re: Carolina Baugh   :   1960    Associated symptoms: Loss of motion/stiffness.  Symptoms are exacerbated by sitting, lifting, standing and bending (sit inna 10' with 5/10 pain, arise from sitting with 6-7/10 pain, 5/10 pain to don shoes/socks) and relieved by NSAID's, rest and heat.  Previous treatment includes chiropractic. There was mild improvement following previous treatment.  Barriers include: None as reported by patient.    Objective:    Lumbar/SI Evaluation  ROM:    AROM Lumbar:   Flexion:    Fingertips to 2-3 inches above ankles and increase in LBP  Ext:                Mod loss and increase   Side Bend:     Left:       Right:   Rotation:        Left:       Right:   Side Glide:     Left: Min loss     Right: Min loss        Lumbar Myotomes: normal  Lumbar DTR's: normal  Cord Signs: normal  Lumbar Dermatomes: normal  Neural Tension/Mobility: Lumbar: Normal      Lumbar Palpation:    Tenderness present at Left: Quadratus Lumborum and Erector Spinae  Tenderness present at Right: Quadratus Lumborum and Erector Spinae    Spinal Segmental Conclusions:   Level: Hypo noted at L1, L2, L3, S1, L5 and L4    Shar Lumbar Evaluation    Posture:  Sitting: poor  Standing: fair  Lordosis: Reduced  Lateral Shift: no  Correction of Posture: better    Test Movements:  FIS:   During: increases After: no worse    Repeat FIS:  During: increases   After: worse    EIS:   During: increases   After: no worse    Repeat EIS:  During: increases   After: no worse    NICOLAS:   During: no effect   After: no effect    Repeat NICOLAS:  During: increases   After: worse    EIL:   During: no effect   After: no effect    Repeat EIL:  During: decreases   After: better        Re: Carolina Baugh   :   1960    Assessment/Plan:    Patient is a 61 year old female with lumbar complaints.    Patient has the following significant findings with corresponding treatment plan.                Diagnosis 1: Lumbar derangement    Pain - US, manual therapy, self management, education, directional preference exercise and home  program  Decreased ROM/flexibility - manual therapy, therapeutic exercise and home program  Decreased joint mobility - manual therapy, therapeutic exercise and home program  Impaired muscle performance - neuro re-education and home program  Decreased function - therapeutic activities and home program  Impaired posture - neuro re-education and home program    Therapy Evaluation Codes:   1) History comprised of:   Personal factors that impact the plan of care:      Time since onset of symptoms.    Comorbidity factors that impact the plan of care are:      None.     Medications impacting care: None.  2) Examination of Body Systems comprised of:   Body structures and functions that impact the plan of care:      Lumbar spine.   Activity limitations that impact the plan of care are:      Bathing, Bending, Dressing, Lifting, Sitting and Stairs.  3) Clinical presentation characteristics are:   Stable/Uncomplicated.  4) Decision-Making    Low complexity using standardized patient assessment instrument and/or   measureable assessment of functional outcome.  Cumulative Therapy Evaluation is: Low complexity.    Previous and current functional limitations: (See Goal Flow Sheet for this information)    Short term and Long term goals: (See Goal Flow Sheet for this information)     Communication ability: Patient appears to be able to clearly communicate and understand verbal and written communication and follow directions correctly.  Treatment Explanation - The following has been discussed with the patient:     RX ordered/plan of care  Anticipated outcomes  Possible risks and side effects  This patient would benefit from PT intervention to resume normal activities.   Rehab potential is good.    Frequency: 1 X week, once daily  Duration: for 10 weeks  Discharge Plan: Achieve all LTG.  Independent in home treatment program.  Reach maximal therapeutic benefit.        Re: Carolina Baugh   :   1960      Thank you for your  referral.    INQUIRIES  Therapist: Emma Carcamo PT  [facility]  [address]  [facility] [address]  Phone: [phone]  Fax: [phone]

## 2021-03-22 ENCOUNTER — OFFICE VISIT (OUTPATIENT)
Dept: FAMILY MEDICINE | Facility: CLINIC | Age: 61
End: 2021-03-22
Payer: COMMERCIAL

## 2021-03-22 VITALS
WEIGHT: 167 LBS | OXYGEN SATURATION: 99 % | BODY MASS INDEX: 26.95 KG/M2 | HEART RATE: 63 BPM | SYSTOLIC BLOOD PRESSURE: 146 MMHG | DIASTOLIC BLOOD PRESSURE: 86 MMHG

## 2021-03-22 DIAGNOSIS — G47.00 PERSISTENT INSOMNIA: Primary | ICD-10-CM

## 2021-03-22 DIAGNOSIS — E78.5 HYPERLIPIDEMIA LDL GOAL <130: ICD-10-CM

## 2021-03-22 DIAGNOSIS — K21.9 GASTROESOPHAGEAL REFLUX DISEASE WITHOUT ESOPHAGITIS: ICD-10-CM

## 2021-03-22 DIAGNOSIS — R03.0 ELEVATED BP WITHOUT DIAGNOSIS OF HYPERTENSION: ICD-10-CM

## 2021-03-22 DIAGNOSIS — E03.9 ACQUIRED HYPOTHYROIDISM: ICD-10-CM

## 2021-03-22 DIAGNOSIS — E03.4 HYPOTHYROIDISM DUE TO ACQUIRED ATROPHY OF THYROID: ICD-10-CM

## 2021-03-22 DIAGNOSIS — J30.1 CHRONIC SEASONAL ALLERGIC RHINITIS DUE TO POLLEN: ICD-10-CM

## 2021-03-22 DIAGNOSIS — G47.00 PERSISTENT INSOMNIA: ICD-10-CM

## 2021-03-22 DIAGNOSIS — Z12.31 VISIT FOR SCREENING MAMMOGRAM: ICD-10-CM

## 2021-03-22 DIAGNOSIS — R09.81 NASAL CONGESTION: ICD-10-CM

## 2021-03-22 LAB
ALBUMIN SERPL-MCNC: 3.2 G/DL (ref 3.4–5)
ALP SERPL-CCNC: 66 U/L (ref 40–150)
ALT SERPL W P-5'-P-CCNC: 23 U/L (ref 0–50)
ANION GAP SERPL CALCULATED.3IONS-SCNC: 4 MMOL/L (ref 3–14)
AST SERPL W P-5'-P-CCNC: 15 U/L (ref 0–45)
BILIRUB SERPL-MCNC: 0.2 MG/DL (ref 0.2–1.3)
BUN SERPL-MCNC: 8 MG/DL (ref 7–30)
CALCIUM SERPL-MCNC: 8.9 MG/DL (ref 8.5–10.1)
CHLORIDE SERPL-SCNC: 109 MMOL/L (ref 94–109)
CHOLEST SERPL-MCNC: 242 MG/DL
CO2 SERPL-SCNC: 28 MMOL/L (ref 20–32)
CREAT SERPL-MCNC: 0.77 MG/DL (ref 0.52–1.04)
GFR SERPL CREATININE-BSD FRML MDRD: 83 ML/MIN/{1.73_M2}
GLUCOSE SERPL-MCNC: 92 MG/DL (ref 70–99)
HBA1C MFR BLD: 5.7 % (ref 0–5.6)
HDLC SERPL-MCNC: 64 MG/DL
LDLC SERPL CALC-MCNC: 153 MG/DL
NONHDLC SERPL-MCNC: 178 MG/DL
POTASSIUM SERPL-SCNC: 3.8 MMOL/L (ref 3.4–5.3)
PROT SERPL-MCNC: 7.2 G/DL (ref 6.8–8.8)
SODIUM SERPL-SCNC: 141 MMOL/L (ref 133–144)
TRIGL SERPL-MCNC: 127 MG/DL
TSH SERPL DL<=0.005 MIU/L-ACNC: 1.5 MU/L (ref 0.4–4)

## 2021-03-22 PROCEDURE — 83036 HEMOGLOBIN GLYCOSYLATED A1C: CPT | Performed by: PATHOLOGY

## 2021-03-22 PROCEDURE — 84443 ASSAY THYROID STIM HORMONE: CPT | Performed by: PATHOLOGY

## 2021-03-22 PROCEDURE — 99396 PREV VISIT EST AGE 40-64: CPT | Performed by: FAMILY MEDICINE

## 2021-03-22 PROCEDURE — 36415 COLL VENOUS BLD VENIPUNCTURE: CPT | Performed by: PATHOLOGY

## 2021-03-22 PROCEDURE — 80061 LIPID PANEL: CPT | Performed by: PATHOLOGY

## 2021-03-22 PROCEDURE — 80053 COMPREHEN METABOLIC PANEL: CPT | Performed by: PATHOLOGY

## 2021-03-22 RX ORDER — NIZATIDINE 150 MG/1
150 CAPSULE ORAL AT BEDTIME
Qty: 90 CAPSULE | Refills: 3 | Status: SHIPPED | OUTPATIENT
Start: 2021-03-22 | End: 2021-03-24

## 2021-03-22 RX ORDER — LEVOTHYROXINE SODIUM 75 UG/1
75 TABLET ORAL DAILY
Qty: 90 TABLET | Refills: 3 | Status: SHIPPED | OUTPATIENT
Start: 2021-03-22 | End: 2022-04-18

## 2021-03-22 RX ORDER — ZOLPIDEM TARTRATE 5 MG/1
TABLET ORAL
Qty: 30 TABLET | Refills: 0 | Status: SHIPPED | OUTPATIENT
Start: 2021-03-22 | End: 2021-09-10

## 2021-03-22 RX ORDER — FLUTICASONE PROPIONATE 50 MCG
2 SPRAY, SUSPENSION (ML) NASAL DAILY
Qty: 16 G | Refills: 11 | Status: SHIPPED | OUTPATIENT
Start: 2021-03-22 | End: 2022-04-18

## 2021-03-22 ASSESSMENT — ANXIETY QUESTIONNAIRES
IF YOU CHECKED OFF ANY PROBLEMS ON THIS QUESTIONNAIRE, HOW DIFFICULT HAVE THESE PROBLEMS MADE IT FOR YOU TO DO YOUR WORK, TAKE CARE OF THINGS AT HOME, OR GET ALONG WITH OTHER PEOPLE: SOMEWHAT DIFFICULT
6. BECOMING EASILY ANNOYED OR IRRITABLE: SEVERAL DAYS
7. FEELING AFRAID AS IF SOMETHING AWFUL MIGHT HAPPEN: SEVERAL DAYS
2. NOT BEING ABLE TO STOP OR CONTROL WORRYING: NEARLY EVERY DAY
3. WORRYING TOO MUCH ABOUT DIFFERENT THINGS: NEARLY EVERY DAY
1. FEELING NERVOUS, ANXIOUS, OR ON EDGE: SEVERAL DAYS
GAD7 TOTAL SCORE: 12
5. BEING SO RESTLESS THAT IT IS HARD TO SIT STILL: NOT AT ALL

## 2021-03-22 ASSESSMENT — PAIN SCALES - GENERAL: PAINLEVEL: MILD PAIN (3)

## 2021-03-22 ASSESSMENT — PATIENT HEALTH QUESTIONNAIRE - PHQ9
SUM OF ALL RESPONSES TO PHQ QUESTIONS 1-9: 10
5. POOR APPETITE OR OVEREATING: NEARLY EVERY DAY
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
## 2021-03-22 NOTE — PATIENT INSTRUCTIONS
Your health care Provider has recommended that you receive the new Shingle vaccine called Shingrix to prevent shingles for ages 50 and above. Many private insurance and Medicare Part D plans cover Shingrix. However, not all insurance carriers cover the entire cost of the Shingrix vaccine if the vaccine is administered at your primary care clinic. The clinic cannot determine your insurance benefits.  Please call your insurance carrier prior. The vaccine comes in two doses. Your second dose should be 2-6 months from your first dose.       Prior to receiving the vaccine, we recommend that you call your insurance carrier and ask them the following questions:            1. Is there a cost difference if I receive the vaccine at my doctor's office or a pharmacy?          2. Does my insurance cover the Shingrix Vaccine and administration of the vaccine?          3. What is my co-pay or deductible for the vaccine?        Please call to schedule a Nurse-Visit only at [phone].  Nurse Visit hours are available Monday, Wednesday, and Friday from 9:00 AM-11:00 AM and 1:00 PM-3:00 PM.     Patient Education     Taking Your Blood Pressure  Blood pressure is the force of blood against the artery wall as it moves from the heart through the blood vessels. You can take your own blood pressure reading using a digital monitor. Take your readings the same each time, using the same arm. Take readings as often as your healthcare provider advises.  About blood pressure monitors  Blood pressure monitors are designed for certain ages and cases. You can find monitors for older adults, for pregnant women, and for children. Make sure the one you choose is the right one for your age and situation.  The American Heart Association advises an automatic cuff monitor that fits on your upper arm (bicep). The cuff should fit your arm size. A cuff that s too large or too small will not give an accurate reading. Measure around your upper arm to find  your size.  Monitors that attach to your finger or wrist are not as accurate as monitors for your upper arm.  Ask your healthcare provider for help in choosing a monitor. Bring your monitor to your next provider visit if you need help in using it the correct way.  The steps below are general instructions for using an automatic digital monitor.  Step 1. Relax      Take your blood pressure at the same time every day, such as in the morning or evening. Or take it at the time your healthcare provider advises.    Wait at least30 minutes after smoking, eating, or exercising. Don't drink coffee, tea, soda, or other caffeinated drinks before checking your blood pressure. If needed, use the restroom beforehand.    Sit comfortably at a table with both feet on the floor. Don't cross your legs or feet. Place the monitor near you.    Rest for a few minutes before you begin. Make sure there are no distractions. This includes TV, cell phones, and other electronics. Wait to have conversations with others until after you measure you blood pressure.  Step 2. Wrap the cuff      Place your arm on the table, palm up. Your arm should be at the level of your heart. Wrap the cuff around your upper arm, just above your elbow. It s best done on bare skin, not over clothing. Most cuffs will show you where the blood vessel in the middle of the arm at the inner side of the elbow (the brachial artery) should line up with the cuff. Look in your monitor's instruction booklet for an illustration. You can also bring your cuff to your healthcare provider and have them show you how to correctly place the cuff.  Step 3. Inflate the cuff      Push the button that starts the pump.    The cuff will tighten, then loosen.    The numbers will change. When they stop changing, your blood pressure reading will appear.    Take 2 or 3 readings 1 minute apart.  Step 4. Write down the results of each reading      Write down your blood pressure numbers for each  reading. Note the date and time. Keep your results in one place, such as a notebook. Even if your monitor has a built-in memory, keep a hard copy of the readings.    Remove the cuff from your arm. Turn off the machine.    Bring your blood pressure records with you to each healthcare provider visit.    If you start a new blood pressure medicine, note the day you started the new medicine. Also note the day if you change the dose of your medicine. Measure your blood pressure before your take your medicine. This information goes on your blood pressure recording sheet. This will help your healthcare provider check how well the medicine changes are working.    Ask your provider what numbers mean that you should call him or her. Also ask what numbers mean you should get help right away.  [employer] last reviewed this educational content on 7/1/2019 2000-2020 [employer]. All rights reserved. This information is not intended as a substitute for professional medical care. Always follow your healthcare professional's instructions.          Goal BP less than 140 /90

## 2021-03-22 NOTE — NURSING NOTE
Chief Complaint   Patient presents with     Physical     low back pain for past 4 months. comes and goes     Kimberly Nissen, EMT at 4:59 PM on 3/22/2021

## 2021-03-23 ASSESSMENT — ANXIETY QUESTIONNAIRES: GAD7 TOTAL SCORE: 12

## 2021-03-23 ASSESSMENT — ASTHMA QUESTIONNAIRES: ACT_TOTALSCORE: 25

## 2021-03-24 ENCOUNTER — TELEPHONE (OUTPATIENT)
Dept: FAMILY MEDICINE | Facility: CLINIC | Age: 61
End: 2021-03-24

## 2021-03-24 DIAGNOSIS — K21.9 GASTROESOPHAGEAL REFLUX DISEASE WITHOUT ESOPHAGITIS: Primary | ICD-10-CM

## 2021-03-24 RX ORDER — FAMOTIDINE 40 MG/1
40 TABLET, FILM COATED ORAL
Qty: 90 TABLET | Refills: 1 | Status: SHIPPED | OUTPATIENT
Start: 2021-03-24 | End: 2022-02-01

## 2021-03-24 NOTE — TELEPHONE ENCOUNTER
[facility], [facility]  Teto Mesa MD    Cc: [facility]             Good afternoon Dr. Mesa -     We received an order for Axid (nizatidine) for Badria.  This product is no longer available in the marketplace and as such we are unable to procure it.  With ranitidine being recalled and also no longer available, the few remaining options for H2RA's would be famotidine.  Would you be willing to send a new Rx to us for famotidine for this patient?  We can notify her of this change when she picks up the medication and provide counseling accordingly.     Thanks!   Crow Tello, PharmD      I tried to call her to discuss medication not available. Will send Famotidine 40 mg instead.  Best wishes,  Teto Mesa MD

## 2021-03-25 ENCOUNTER — THERAPY VISIT (OUTPATIENT)
Dept: PHYSICAL THERAPY | Facility: CLINIC | Age: 61
End: 2021-03-25
Payer: COMMERCIAL

## 2021-03-25 DIAGNOSIS — M54.50 BILATERAL LOW BACK PAIN WITHOUT SCIATICA: ICD-10-CM

## 2021-03-25 PROCEDURE — 97035 APP MDLTY 1+ULTRASOUND EA 15: CPT | Mod: GP | Performed by: PHYSICAL THERAPIST

## 2021-03-25 PROCEDURE — 97110 THERAPEUTIC EXERCISES: CPT | Mod: GP | Performed by: PHYSICAL THERAPIST

## 2021-04-01 ENCOUNTER — THERAPY VISIT (OUTPATIENT)
Dept: PHYSICAL THERAPY | Facility: CLINIC | Age: 61
End: 2021-04-01
Payer: COMMERCIAL

## 2021-04-01 DIAGNOSIS — M54.50 BILATERAL LOW BACK PAIN WITHOUT SCIATICA: ICD-10-CM

## 2021-04-01 PROCEDURE — 97035 APP MDLTY 1+ULTRASOUND EA 15: CPT | Mod: GP | Performed by: PHYSICAL THERAPIST

## 2021-04-01 PROCEDURE — 97110 THERAPEUTIC EXERCISES: CPT | Mod: GP | Performed by: PHYSICAL THERAPIST

## 2021-04-01 PROCEDURE — 97140 MANUAL THERAPY 1/> REGIONS: CPT | Mod: GP | Performed by: PHYSICAL THERAPIST

## 2021-04-08 ENCOUNTER — THERAPY VISIT (OUTPATIENT)
Dept: PHYSICAL THERAPY | Facility: CLINIC | Age: 61
End: 2021-04-08
Payer: COMMERCIAL

## 2021-04-08 DIAGNOSIS — M54.50 BILATERAL LOW BACK PAIN WITHOUT SCIATICA: ICD-10-CM

## 2021-04-08 PROCEDURE — 97110 THERAPEUTIC EXERCISES: CPT | Mod: GP | Performed by: PHYSICAL THERAPIST

## 2021-04-08 PROCEDURE — 97140 MANUAL THERAPY 1/> REGIONS: CPT | Mod: GP | Performed by: PHYSICAL THERAPIST

## 2021-04-08 PROCEDURE — 97035 APP MDLTY 1+ULTRASOUND EA 15: CPT | Mod: GP | Performed by: PHYSICAL THERAPIST

## 2021-04-19 ENCOUNTER — THERAPY VISIT (OUTPATIENT)
Dept: PHYSICAL THERAPY | Facility: CLINIC | Age: 61
End: 2021-04-19
Payer: COMMERCIAL

## 2021-04-19 DIAGNOSIS — M54.50 BILATERAL LOW BACK PAIN WITHOUT SCIATICA: ICD-10-CM

## 2021-04-19 PROCEDURE — 97110 THERAPEUTIC EXERCISES: CPT | Mod: GP | Performed by: PHYSICAL THERAPIST

## 2021-04-19 PROCEDURE — 97140 MANUAL THERAPY 1/> REGIONS: CPT | Mod: GP | Performed by: PHYSICAL THERAPIST

## 2021-04-19 PROCEDURE — 97035 APP MDLTY 1+ULTRASOUND EA 15: CPT | Mod: GP | Performed by: PHYSICAL THERAPIST

## 2021-05-19 ENCOUNTER — TELEPHONE (OUTPATIENT)
Dept: FAMILY MEDICINE | Facility: CLINIC | Age: 61
End: 2021-05-19

## 2021-05-19 NOTE — TELEPHONE ENCOUNTER
Call patient to help schedule return in about 3 months (around 6/22/2021) for BP Recheck per Dr Mesa's last visit on 3/22/21. Patient states she will schedule via [employer].    Jocelyne Miranda, Clinic Coordinator, May 19, 2021 at 12:47 PM

## 2021-06-08 PROBLEM — M54.50 BILATERAL LOW BACK PAIN WITHOUT SCIATICA: Status: RESOLVED | Noted: 2021-03-18 | Resolved: 2021-06-08

## 2021-06-08 NOTE — PROGRESS NOTES
Discharge Note    Progress reporting period is from initial evaluation date (please see noted date below) to Apr 19, 2021.  No linked episodes      Badria failed to follow up and current status is unknown.  Please see information below for last relevant information on current status.  Patient seen for 5 visits.    SUBJECTIVE  Subjective changes noted by patient:  She still notes good and bad days. She has stiffness in the morning when she gets up. THe pain used to be constant and is now intermittnent and she has more good days.  She gets pain with lifting or standing >15'.  .  Current pain level is 2/10.     Previous pain level was   .   Changes in function:  Yes (See Goal flowsheet attached for changes in current functional level)  Adverse reaction to treatment or activity: None    OBJECTIVE  Changes noted in objective findings: Standing LROM: FB with fingertips to mid lower leg and no change, but increase with RFIS and BB mod loss and decrease and better with REIL and following manual lumbar PAs.      ASSESSMENT/PLAN  Diagnosis: lumbar derangement   Updated problem list and treatment plan:   Pain - HEP  Decreased ROM/flexibility - HEP  Decreased function - HEP  Impaired muscle performance - HEP  Impaired posture - HEP  STG/LTGs have been met or progress has been made towards goals:  Yes, please see goal flowsheet for most current information  Assessment of Progress: current status is unknown.    Last current status: Pt is progressing as expected   Self Management Plans:  HEP  I have re-evaluated this patient and find that the nature, scope, duration and intensity of the therapy is appropriate for the medical condition of the patient.  Badria continues to require the following intervention to meet STG and LTG's:  HEP.    Recommendations:  Discharge with current home program.  Patient to follow up with MD as needed.    Please refer to the daily flowsheet for treatment today, total treatment time and time spent  performing 1:1 timed codes.

## 2021-06-28 ENCOUNTER — ANCILLARY PROCEDURE (OUTPATIENT)
Dept: MAMMOGRAPHY | Facility: CLINIC | Age: 61
End: 2021-06-28
Attending: FAMILY MEDICINE
Payer: COMMERCIAL

## 2021-06-28 DIAGNOSIS — Z12.31 VISIT FOR SCREENING MAMMOGRAM: ICD-10-CM

## 2021-06-28 PROCEDURE — 77063 BREAST TOMOSYNTHESIS BI: CPT | Mod: GC | Performed by: RADIOLOGY

## 2021-06-28 PROCEDURE — 77067 SCR MAMMO BI INCL CAD: CPT | Mod: GC | Performed by: RADIOLOGY

## 2021-09-10 DIAGNOSIS — G47.00 PERSISTENT INSOMNIA: ICD-10-CM

## 2021-09-10 RX ORDER — ZOLPIDEM TARTRATE 5 MG/1
TABLET ORAL
Qty: 30 TABLET | Refills: 0 | Status: SHIPPED | OUTPATIENT
Start: 2021-09-10 | End: 2022-04-18

## 2021-09-10 NOTE — TELEPHONE ENCOUNTER
Patient Requested  zolpidem (AMBIEN) 5 MG tablet  Last Filled  03/22/2021  Last Office Visit  02/03/2020  Next Office Visit  Nothing scheduled   Checked  09/10/2021    DX: Persistent insomnia     Pharmacy:     EB Soares RN at 2:26 PM on 9/10/2021.

## 2021-10-24 ENCOUNTER — HEALTH MAINTENANCE LETTER (OUTPATIENT)
Age: 61
End: 2021-10-24

## 2021-11-08 ENCOUNTER — IMMUNIZATION (OUTPATIENT)
Dept: FAMILY MEDICINE | Facility: CLINIC | Age: 61
End: 2021-11-08
Payer: COMMERCIAL

## 2021-11-08 DIAGNOSIS — Z23 NEED FOR PROPHYLACTIC VACCINATION AND INOCULATION AGAINST INFLUENZA: Primary | ICD-10-CM

## 2021-11-08 PROCEDURE — 90682 RIV4 VACC RECOMBINANT DNA IM: CPT

## 2021-11-08 PROCEDURE — 99207 PR NO CHARGE NURSE ONLY: CPT

## 2021-11-08 PROCEDURE — 90471 IMMUNIZATION ADMIN: CPT

## 2021-11-10 ENCOUNTER — IMMUNIZATION (OUTPATIENT)
Dept: NURSING | Facility: CLINIC | Age: 61
End: 2021-11-10
Payer: COMMERCIAL

## 2021-11-10 PROCEDURE — 91300 PR COVID VAC PFIZER DIL RECON 30 MCG/0.3 ML IM: CPT

## 2021-11-10 PROCEDURE — 0004A PR COVID VAC PFIZER DIL RECON 30 MCG/0.3 ML IM: CPT

## 2021-12-02 ENCOUNTER — OFFICE VISIT (OUTPATIENT)
Dept: OPHTHALMOLOGY | Facility: CLINIC | Age: 61
End: 2021-12-02
Payer: COMMERCIAL

## 2021-12-02 DIAGNOSIS — H02.88A MEIBOMIAN GLAND DYSFUNCTION (MGD) OF UPPER AND LOWER LIDS OF BOTH EYES: Primary | ICD-10-CM

## 2021-12-02 DIAGNOSIS — H04.123 DRY EYE SYNDROME, BILATERAL: ICD-10-CM

## 2021-12-02 DIAGNOSIS — H02.88B MEIBOMIAN GLAND DYSFUNCTION (MGD) OF UPPER AND LOWER LIDS OF BOTH EYES: Primary | ICD-10-CM

## 2021-12-02 PROCEDURE — 92002 INTRM OPH EXAM NEW PATIENT: CPT | Performed by: STUDENT IN AN ORGANIZED HEALTH CARE EDUCATION/TRAINING PROGRAM

## 2021-12-02 ASSESSMENT — CUP TO DISC RATIO
OS_RATIO: 0.2
OD_RATIO: 0.3

## 2021-12-02 ASSESSMENT — SLIT LAMP EXAM - LIDS
COMMENTS: 1+ MGD
COMMENTS: 1+ MGD

## 2021-12-02 ASSESSMENT — VISUAL ACUITY
OS_PH_SC+: -1
OD_SC: 20/40
OS_SC+: -2
OD_SC+: -1
OS_SC: 20/50
METHOD: SNELLEN - LINEAR
OS_PH_SC: 20/30

## 2021-12-02 ASSESSMENT — TONOMETRY
OD_IOP_MMHG: 15
OS_IOP_MMHG: 15
IOP_METHOD: ICARE

## 2021-12-02 ASSESSMENT — EXTERNAL EXAM - RIGHT EYE: OD_EXAM: NORMAL

## 2021-12-02 ASSESSMENT — EXTERNAL EXAM - LEFT EYE: OS_EXAM: NORMAL

## 2021-12-02 NOTE — PROGRESS NOTES
Current Eye Medications:  Artificial tears 5 times daily as needed left eye. Maybe once daily right eye.     Subjective:  Stabbing pain left eye for about 15 days. Pain is constant, but sometimes pain is less severe. Today pain is very bad. Feels heavy left eye. Sometimes itchy left eye, not often. Vision is fine both eyes.     Has been seen previously at the  in their comprehensive dept.     Objective:  See Ophthalmology Exam.       Assessment:  Carolina Baugh is a 61 year old female who presents with:   Encounter Diagnoses   Name Primary?     Meibomian gland dysfunction (MGD) of upper and lower lids of both eyes      Dry eye syndrome, bilateral      Plan:  Use a gel tear at bedtime and three times a day (like Refresh Liquigel or GenTeal Gel Tears) instead of the thin artificial tears    Continue using a warm compress on the eyes    Carmen Fishman MD  [phone]

## 2021-12-02 NOTE — LETTER
12/2/2021         RE: Carolina Baugh  [address]  [place] [address]        Dear Colleague,    Thank you for referring your patient, Carolina Baugh, to the [facility]. Please see a copy of my visit note below.     Current Eye Medications:  Artificial tears 5 times daily as needed left eye. Maybe once daily right eye.     Subjective:  Stabbing pain left eye for about 15 days. Pain is constant, but sometimes pain is less severe. Today pain is very bad. Feels heavy left eye. Sometimes itchy left eye, not often. Vision is fine both eyes.     Has been seen previously at the  in their comprehensive dept.     Objective:  See Ophthalmology Exam.       Assessment:  Carolina Baugh is a 61 year old female who presents with:   Encounter Diagnoses   Name Primary?     Meibomian gland dysfunction (MGD) of upper and lower lids of both eyes      Dry eye syndrome, bilateral      Plan:  Use a gel tear at bedtime and three times a day (like Refresh Liquigel or GenTeal Gel Tears) instead of the thin artificial tears    Continue using a warm compress on the eyes    Carmen Fishman MD  [phone]         Again, thank you for allowing me to participate in the care of your patient.        Sincerely,        Carmen Fishman MD

## 2021-12-02 NOTE — PATIENT INSTRUCTIONS
Use a gel tear at bedtime and three times a day (like Refresh Liquigel or GenTeal Gel Tears) instead of the thin artificial tears    Continue using a warm compress on the eyes    Carmen Fishman MD  [phone]

## 2022-01-01 NOTE — PATIENT INSTRUCTIONS
At [facility], we strive to deliver an exceptional experience to you, every time we see you.  If you receive a survey in the mail, please send us back your thoughts. We really do value your feedback.    Based on your medical history, these are the current health maintenance/preventive care services that you are due for (some may have been done at this visit.)  Health Maintenance Due   Topic Date Due     PAP Q3 YR  12/29/2017         Suggested websites for health information:  Www.[facility].org : Up to date and easily searchable information on multiple topics.  Www.medlineplus.gov : medication info, interactive tutorials, watch real surgeries online  Www.familydoctor.org : good info from the Academy of Family Physicians  Www.cdc.gov : public health info, travel advisories, epidemics (H1N1)  Www.aap.org : children's health info, normal development, vaccinations  Www.health[facility].us : [place] dept of health, public health issues in [place], N1N1    Your care team:                            Family Medicine Internal Medicine   MD Rito Pepper MD Shantel Branch-Fleming, MD Katya Georgiev PA-C Nam Ho, MD Pediatrics   MARCELA Cobian, MD Inez Gay CNP, MD Deborah Mielke, MD Kim Thein, APRN CNP      Clinic hours: Monday - Thursday 7 am-7 pm; Fridays 7 am-5 pm.   Urgent care: Monday - Friday 11 am-9 pm; Saturday and Sunday 9 am-5 pm.  Pharmacy : Monday -Thursday 8 am-8 pm; Friday 8 am-6 pm; Saturday and Sunday 9 am-5 pm.     Clinic: [phone]   Pharmacy: [phone]      
No

## 2022-01-31 DIAGNOSIS — K21.9 GASTROESOPHAGEAL REFLUX DISEASE WITHOUT ESOPHAGITIS: ICD-10-CM

## 2022-02-01 RX ORDER — FAMOTIDINE 40 MG/1
40 TABLET, FILM COATED ORAL
Qty: 90 TABLET | Refills: 1 | Status: SHIPPED | OUTPATIENT
Start: 2022-02-01 | End: 2022-07-11

## 2022-04-17 DIAGNOSIS — R03.0 ELEVATED BP WITHOUT DIAGNOSIS OF HYPERTENSION: ICD-10-CM

## 2022-04-17 DIAGNOSIS — K21.9 GASTROESOPHAGEAL REFLUX DISEASE WITHOUT ESOPHAGITIS: ICD-10-CM

## 2022-04-17 DIAGNOSIS — E03.9 ACQUIRED HYPOTHYROIDISM: Primary | ICD-10-CM

## 2022-04-17 DIAGNOSIS — E78.5 HYPERLIPIDEMIA LDL GOAL <100: ICD-10-CM

## 2022-04-17 DIAGNOSIS — R73.9 HYPERGLYCEMIA: ICD-10-CM
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
## 2022-04-18 ENCOUNTER — LAB (OUTPATIENT)
Dept: LAB | Facility: CLINIC | Age: 62
End: 2022-04-18
Payer: COMMERCIAL

## 2022-04-18 ENCOUNTER — OFFICE VISIT (OUTPATIENT)
Dept: FAMILY MEDICINE | Facility: CLINIC | Age: 62
End: 2022-04-18
Payer: COMMERCIAL

## 2022-04-18 VITALS
HEIGHT: 66 IN | OXYGEN SATURATION: 100 % | TEMPERATURE: 97.8 F | HEART RATE: 60 BPM | WEIGHT: 170.2 LBS | SYSTOLIC BLOOD PRESSURE: 143 MMHG | DIASTOLIC BLOOD PRESSURE: 88 MMHG | RESPIRATION RATE: 16 BRPM | BODY MASS INDEX: 27.35 KG/M2

## 2022-04-18 DIAGNOSIS — Z00.00 PREVENTATIVE HEALTH CARE: ICD-10-CM

## 2022-04-18 DIAGNOSIS — G47.00 PERSISTENT INSOMNIA: ICD-10-CM

## 2022-04-18 DIAGNOSIS — Z12.31 VISIT FOR SCREENING MAMMOGRAM: ICD-10-CM

## 2022-04-18 DIAGNOSIS — Z71.89 ADVANCE CARE PLANNING: ICD-10-CM

## 2022-04-18 DIAGNOSIS — Z12.11 COLON CANCER SCREENING: ICD-10-CM

## 2022-04-18 DIAGNOSIS — E03.9 ACQUIRED HYPOTHYROIDISM: ICD-10-CM

## 2022-04-18 DIAGNOSIS — Z00.00 PREVENTATIVE HEALTH CARE: Primary | ICD-10-CM

## 2022-04-18 DIAGNOSIS — R03.0 ELEVATED BP WITHOUT DIAGNOSIS OF HYPERTENSION: ICD-10-CM

## 2022-04-18 DIAGNOSIS — E78.5 HYPERLIPIDEMIA LDL GOAL <100: ICD-10-CM

## 2022-04-18 DIAGNOSIS — R09.81 NASAL CONGESTION: ICD-10-CM

## 2022-04-18 DIAGNOSIS — J30.1 CHRONIC SEASONAL ALLERGIC RHINITIS DUE TO POLLEN: ICD-10-CM

## 2022-04-18 DIAGNOSIS — E03.4 HYPOTHYROIDISM DUE TO ACQUIRED ATROPHY OF THYROID: ICD-10-CM

## 2022-04-18 DIAGNOSIS — Z23 HIGH PRIORITY FOR 2019-NCOV VACCINE: ICD-10-CM

## 2022-04-18 LAB
ALBUMIN SERPL-MCNC: 3.5 G/DL (ref 3.4–5)
ALP SERPL-CCNC: 70 U/L (ref 40–150)
ALT SERPL W P-5'-P-CCNC: 19 U/L (ref 0–50)
ANION GAP SERPL CALCULATED.3IONS-SCNC: 4 MMOL/L (ref 3–14)
AST SERPL W P-5'-P-CCNC: 20 U/L (ref 0–45)
BILIRUB SERPL-MCNC: 0.3 MG/DL (ref 0.2–1.3)
BUN SERPL-MCNC: 8 MG/DL (ref 7–30)
CALCIUM SERPL-MCNC: 9.1 MG/DL (ref 8.5–10.1)
CHLORIDE BLD-SCNC: 108 MMOL/L (ref 94–109)
CHOLEST SERPL-MCNC: 261 MG/DL
CO2 SERPL-SCNC: 28 MMOL/L (ref 20–32)
CREAT SERPL-MCNC: 0.76 MG/DL (ref 0.52–1.04)
FASTING STATUS PATIENT QL REPORTED: YES
GFR SERPL CREATININE-BSD FRML MDRD: 88 ML/MIN/1.73M2
GLUCOSE BLD-MCNC: 97 MG/DL (ref 70–99)
HBA1C MFR BLD: 5.5 % (ref 0–5.6)
HDLC SERPL-MCNC: 65 MG/DL
HGB BLD-MCNC: 13.4 G/DL (ref 11.7–15.7)
LDLC SERPL CALC-MCNC: 181 MG/DL
NONHDLC SERPL-MCNC: 196 MG/DL
POTASSIUM BLD-SCNC: 4.2 MMOL/L (ref 3.4–5.3)
PROT SERPL-MCNC: 7.4 G/DL (ref 6.8–8.8)
SODIUM SERPL-SCNC: 140 MMOL/L (ref 133–144)
T4 FREE SERPL-MCNC: 1.27 NG/DL (ref 0.76–1.46)
TRIGL SERPL-MCNC: 77 MG/DL
TSH SERPL DL<=0.005 MIU/L-ACNC: 4.08 MU/L (ref 0.4–4)

## 2022-04-18 PROCEDURE — 91305 COVID-19,PF,PFIZER (12+ YRS): CPT | Performed by: FAMILY MEDICINE

## 2022-04-18 PROCEDURE — 83036 HEMOGLOBIN GLYCOSYLATED A1C: CPT | Mod: 90 | Performed by: PATHOLOGY

## 2022-04-18 PROCEDURE — 0054A COVID-19,PF,PFIZER (12+ YRS): CPT | Performed by: FAMILY MEDICINE

## 2022-04-18 PROCEDURE — 80053 COMPREHEN METABOLIC PANEL: CPT | Mod: 90 | Performed by: PATHOLOGY

## 2022-04-18 PROCEDURE — 99000 SPECIMEN HANDLING OFFICE-LAB: CPT | Performed by: PATHOLOGY

## 2022-04-18 PROCEDURE — 85018 HEMOGLOBIN: CPT | Performed by: PATHOLOGY

## 2022-04-18 PROCEDURE — 80061 LIPID PANEL: CPT | Mod: 90 | Performed by: PATHOLOGY

## 2022-04-18 PROCEDURE — 36415 COLL VENOUS BLD VENIPUNCTURE: CPT | Performed by: PATHOLOGY

## 2022-04-18 PROCEDURE — 84439 ASSAY OF FREE THYROXINE: CPT | Mod: 90 | Performed by: PATHOLOGY

## 2022-04-18 PROCEDURE — 84443 ASSAY THYROID STIM HORMONE: CPT | Mod: 90 | Performed by: PATHOLOGY

## 2022-04-18 PROCEDURE — 99396 PREV VISIT EST AGE 40-64: CPT | Mod: 25 | Performed by: FAMILY MEDICINE

## 2022-04-18 RX ORDER — FLUTICASONE PROPIONATE 50 MCG
2 SPRAY, SUSPENSION (ML) NASAL DAILY
Qty: 16 G | Refills: 11 | Status: SHIPPED | OUTPATIENT
Start: 2022-04-18 | End: 2024-06-10

## 2022-04-18 RX ORDER — LEVOTHYROXINE SODIUM 75 UG/1
75 TABLET ORAL DAILY
Qty: 90 TABLET | Refills: 3 | Status: SHIPPED | OUTPATIENT
Start: 2022-04-18 | End: 2023-06-13

## 2022-04-18 RX ORDER — ZOLPIDEM TARTRATE 5 MG/1
TABLET ORAL
Qty: 30 TABLET | Refills: 0 | Status: SHIPPED | OUTPATIENT
Start: 2022-04-18 | End: 2023-07-02

## 2022-04-18 SDOH — SOCIAL STABILITY: SOCIAL NETWORK: ARE YOU MARRIED, WIDOWED, DIVORCED, SEPARATED, NEVER MARRIED, OR LIVING WITH A PARTNER?: MARRIED

## 2022-04-18 SDOH — SOCIAL STABILITY: SOCIAL INSECURITY: WITHIN THE LAST YEAR, HAVE YOU BEEN AFRAID OF YOUR PARTNER OR EX-PARTNER?: NO

## 2022-04-18 SDOH — SOCIAL STABILITY: SOCIAL INSECURITY: WITHIN THE LAST YEAR, HAVE YOU BEEN HUMILIATED OR EMOTIONALLY ABUSED IN OTHER WAYS BY YOUR PARTNER OR EX-PARTNER?: NO

## 2022-04-18 SDOH — SOCIAL STABILITY: SOCIAL NETWORK: HOW OFTEN DO YOU ATTENT MEETINGS OF THE CLUB OR ORGANIZATION YOU BELONG TO?: MORE THAN 4 TIMES PER YEAR

## 2022-04-18 SDOH — ECONOMIC STABILITY: INCOME INSECURITY: HOW HARD IS IT FOR YOU TO PAY FOR THE VERY BASICS LIKE FOOD, HOUSING, MEDICAL CARE, AND HEATING?: NOT HARD AT ALL

## 2022-04-18 SDOH — ECONOMIC STABILITY: HOUSING INSECURITY: IN THE LAST 12 MONTHS, HOW MANY PLACES HAVE YOU LIVED?: 1

## 2022-04-18 SDOH — ECONOMIC STABILITY: TRANSPORTATION INSECURITY
IN THE PAST 12 MONTHS, HAS THE LACK OF TRANSPORTATION KEPT YOU FROM MEDICAL APPOINTMENTS OR FROM GETTING MEDICATIONS?: NO

## 2022-04-18 SDOH — ECONOMIC STABILITY: HOUSING INSECURITY
IN THE LAST 12 MONTHS, WAS THERE A TIME WHEN YOU DID NOT HAVE A STEADY PLACE TO SLEEP OR SLEPT IN A SHELTER (INCLUDING NOW)?: NO

## 2022-04-18 SDOH — HEALTH STABILITY: MENTAL HEALTH: HOW OFTEN DO YOU HAVE 6 OR MORE DRINKS ON ONE OCCASION?: NEVER

## 2022-04-18 SDOH — ECONOMIC STABILITY: FOOD INSECURITY: WITHIN THE PAST 12 MONTHS, YOU WORRIED THAT YOUR FOOD WOULD RUN OUT BEFORE YOU GOT MONEY TO BUY MORE.: NEVER TRUE

## 2022-04-18 SDOH — HEALTH STABILITY: MENTAL HEALTH: HOW MANY STANDARD DRINKS CONTAINING ALCOHOL DO YOU HAVE ON A TYPICAL DAY?: PATIENT DOES NOT DRINK

## 2022-04-18 SDOH — ECONOMIC STABILITY: FOOD INSECURITY: WITHIN THE PAST 12 MONTHS, THE FOOD YOU BOUGHT JUST DIDN'T LAST AND YOU DIDN'T HAVE MONEY TO GET MORE.: NEVER TRUE

## 2022-04-18 SDOH — HEALTH STABILITY: MENTAL HEALTH: HOW OFTEN DO YOU HAVE A DRINK CONTAINING ALCOHOL?: NEVER

## 2022-04-18 SDOH — ECONOMIC STABILITY: INCOME INSECURITY: IN THE LAST 12 MONTHS, WAS THERE A TIME WHEN YOU WERE NOT ABLE TO PAY THE MORTGAGE OR RENT ON TIME?: NO

## 2022-04-18 SDOH — SOCIAL STABILITY: SOCIAL NETWORK: HOW OFTEN DO YOU ATTEND CHURCH OR RELIGIOUS SERVICES?: MORE THAN 4 TIMES PER YEAR

## 2022-04-18 SDOH — SOCIAL STABILITY: SOCIAL NETWORK: HOW OFTEN DO YOU GET TOGETHER WITH FRIENDS OR RELATIVES?: MORE THAN THREE TIMES A WEEK

## 2022-04-18 SDOH — HEALTH STABILITY: MENTAL HEALTH
STRESS IS WHEN SOMEONE FEELS TENSE, NERVOUS, ANXIOUS, OR CAN'T SLEEP AT NIGHT BECAUSE THEIR MIND IS TROUBLED. HOW STRESSED ARE YOU?: TO SOME EXTENT

## 2022-04-18 SDOH — SOCIAL STABILITY: SOCIAL NETWORK
DO YOU BELONG TO ANY CLUBS OR ORGANIZATIONS SUCH AS CHURCH GROUPS UNIONS, FRATERNAL OR ATHLETIC GROUPS, OR SCHOOL GROUPS?: YES

## 2022-04-18 SDOH — HEALTH STABILITY: PHYSICAL HEALTH: ON AVERAGE, HOW MANY MINUTES DO YOU ENGAGE IN EXERCISE AT THIS LEVEL?: 30 MIN

## 2022-04-18 SDOH — SOCIAL STABILITY: SOCIAL NETWORK
IN A TYPICAL WEEK, HOW MANY TIMES DO YOU TALK ON THE PHONE WITH FAMILY, FRIENDS, OR NEIGHBORS?: MORE THAN THREE TIMES A WEEK

## 2022-04-18 SDOH — HEALTH STABILITY: PHYSICAL HEALTH: ON AVERAGE, HOW MANY DAYS PER WEEK DO YOU ENGAGE IN MODERATE TO STRENUOUS EXERCISE (LIKE A BRISK WALK)?: 7 DAYS

## 2022-04-18 SDOH — ECONOMIC STABILITY: TRANSPORTATION INSECURITY
IN THE PAST 12 MONTHS, HAS LACK OF TRANSPORTATION KEPT YOU FROM MEETINGS, WORK, OR FROM GETTING THINGS NEEDED FOR DAILY LIVING?: NO

## 2022-04-18 SDOH — SOCIAL STABILITY: SOCIAL INSECURITY
WITHIN THE LAST YEAR, HAVE YOU BEEN KICKED, HIT, SLAPPED, OR OTHERWISE PHYSICALLY HURT BY YOUR PARTNER OR EX-PARTNER?: NO

## 2022-04-18 SDOH — SOCIAL STABILITY: SOCIAL INSECURITY
WITHIN THE LAST YEAR, HAVE TO BEEN RAPED OR FORCED TO HAVE ANY KIND OF SEXUAL ACTIVITY BY YOUR PARTNER OR EX-PARTNER?: NO

## 2022-04-18 ASSESSMENT — PAIN SCALES - GENERAL: PAINLEVEL: MODERATE PAIN (4)

## 2022-04-18 ASSESSMENT — ANXIETY QUESTIONNAIRES
GAD7 TOTAL SCORE: 6
5. BEING SO RESTLESS THAT IT IS HARD TO SIT STILL: NOT AT ALL
6. BECOMING EASILY ANNOYED OR IRRITABLE: SEVERAL DAYS
7. FEELING AFRAID AS IF SOMETHING AWFUL MIGHT HAPPEN: SEVERAL DAYS
IF YOU CHECKED OFF ANY PROBLEMS ON THIS QUESTIONNAIRE, HOW DIFFICULT HAVE THESE PROBLEMS MADE IT FOR YOU TO DO YOUR WORK, TAKE CARE OF THINGS AT HOME, OR GET ALONG WITH OTHER PEOPLE: SOMEWHAT DIFFICULT
1. FEELING NERVOUS, ANXIOUS, OR ON EDGE: SEVERAL DAYS
3. WORRYING TOO MUCH ABOUT DIFFERENT THINGS: SEVERAL DAYS
2. NOT BEING ABLE TO STOP OR CONTROL WORRYING: SEVERAL DAYS

## 2022-04-18 ASSESSMENT — LIFESTYLE VARIABLES
SKIP TO QUESTIONS 9-10: 1
AUDIT-C TOTAL SCORE: 0

## 2022-04-18 ASSESSMENT — PATIENT HEALTH QUESTIONNAIRE - PHQ9
SUM OF ALL RESPONSES TO PHQ QUESTIONS 1-9: 6
5. POOR APPETITE OR OVEREATING: SEVERAL DAYS

## 2022-04-18 NOTE — PATIENT INSTRUCTIONS
Thank you for visiting the Primary Care Center today at the [facility]! The following is some information about our clinic:     Primary Care Center Frequently-Asked Questions    (1) How do I schedule appointments at the [facility]?     Primary Care--to schedule or make changes to an existing appointment, please call our primary care line at [phone].    Labs--to schedule a lab appointment at the [facility] you can use Image[employer]rief or call [phone]. If you have a [place] location that is closer to home, you can reach out to that location for scheduling options.     Imaging--if you need to schedule a CT, X-ray, MRI, mammogram, ultrasound, or other imaging study you can call [phone] to schedule at the [facility] or any other [facility] imaging location.     Referrals--if a referral to another specialty was ordered you can expect a phone call from their scheduling team. If you have not heard from them in a week, please call us or send us a ImageBrief message to check the status or get a scheduling number. Please note that this only applies to internal [facility] referrals. If the referral is external you would need to contact their office for scheduling.     (2) I have a question about my visit, who do I contact?     You can call us at the primary care line at [phone] to ask questions about your visit. You can also send a secure message through ImageBrief, which is reviewed by clinic staff. Please note that Image[employer]rief messages have a twenty-four to forty-eight business hour turnaround time and should not be used for urgent concerns.    (3) How will I get the results of my tests?    If you are signed up for ImageBrief all tests will be released to you within twenty-four hours of resulting. Please allow three to five days for your doctor to review your results and place a note interpreting the results. If you do not have [employer] you will receive  your results through mail seven to ten business days following the return of the tests. Please note that if there should be any urgent or concerning results that your doctor or their registered nurse will reach out to you the same day as the tests come back. If you have follow up questions about your results or would like to discuss the results in detail please schedule a follow up with your provider either in person or virtually.     (4) How do I get refills of my prescriptions?     You should always first contact your pharmacy for refills of your medications. If submitting a refill request on [employer], please be sure to submit the request only once--repeat requests can cause delays in refill. If you are requesting a NEW medication or a medication related to new symptoms you will need to schedule an appointment with a provider prior to approval. Please note: Routine medication refills have up to one to three business day turnaround whereas controlled substances refills have up to five to seven business day turnaround.    (5) I have new symptoms, what do I do?     If you are having an immediate medical emergency, you should dial 911 for assistance.   For anything urgent that needs to be seen within a few hours to one day you should visit a local urgent care for assistance.  For non-urgent symptoms that need to be seen within a few days to a week you can schedule with an available provider in primary care by going to [employer] or calling [phone].   If you are not sure how serious your symptoms are or you would like to receive medical advice you can always call [phone] to speak with a triage nurse.      Your health care Provider has recommended that you receive the new Shingle vaccine called Shingrix to prevent shingles for ages 50 and above. Many private insurance and Medicare Part D plans cover Shingrix. However, not all insurance carriers cover the entire cost of the Shingrix vaccine if the vaccine is  administered at your primary care clinic. The clinic cannot determine your insurance benefits.  Please call your insurance carrier prior. The vaccine comes in two doses. Your second dose should be 2-6 months from your first dose.       Prior to receiving the vaccine, we recommend that you call your insurance carrier and ask them the following questions:            1. Is there a cost difference if I receive the vaccine at my doctor's office or a pharmacy?          2. Does my insurance cover the Shingrix Vaccine and administration of the vaccine?          3. What is my co-pay or deductible for the vaccine?        Please call to schedule a Nurse-Visit only at [phone].  Nurse Visit hours are available Monday, Wednesday, and Friday from 9:00 AM-11:00 AM and 1:00 PM-3:00 PM.

## 2022-04-18 NOTE — NURSING NOTE
Chief Complaint   Patient presents with     Physical       MAGDY Buckner at 3:46 PM on 4/18/2022.

## 2022-04-19 ASSESSMENT — ANXIETY QUESTIONNAIRES: GAD7 TOTAL SCORE: 6

## 2022-04-19 NOTE — RESULT ENCOUNTER NOTE
Dear Carolina Baugh   We reviewed your thyroid is close to goal and you may have missed a few doses so we will continue with current dose and recheck with your cholesterol fasting in 3-6 months.  Cholesterol high in part due to protective HDL cholesterol. Risk score calculation is 6 % goal less than 7.5% therefore lifestyle change lowering cholesterol in your diet for 3-6 moths and check fasting lab through lab appointment.  Your hemoglobin,kidney, liver, blood sugar, A1C 5.5, calcium, sodium and potassium were normal or within acceptable range.   Best wishes,  Teto Mesa MD

## 2022-05-16 ENCOUNTER — APPOINTMENT (OUTPATIENT)
Dept: LAB | Facility: CLINIC | Age: 62
End: 2022-05-16
Payer: COMMERCIAL

## 2022-05-16 LAB — HEMOCCULT STL QL IA: NEGATIVE

## 2022-05-16 PROCEDURE — 82274 ASSAY TEST FOR BLOOD FECAL: CPT | Mod: 90 | Performed by: PATHOLOGY

## 2022-05-16 PROCEDURE — 99000 SPECIMEN HANDLING OFFICE-LAB: CPT | Performed by: PATHOLOGY

## 2022-05-18 NOTE — RESULT ENCOUNTER NOTE
Additional lab returned   Congratulations.  Your test for fecal (stool) occult blood colon cancer screen was negative, good result.  Best wishes,  Teto Mesa MD

## 2022-06-16 ENCOUNTER — LAB (OUTPATIENT)
Dept: URGENT CARE | Facility: URGENT CARE | Age: 62
End: 2022-06-16
Payer: COMMERCIAL

## 2022-06-16 ENCOUNTER — TELEPHONE (OUTPATIENT)
Dept: URGENT CARE | Facility: URGENT CARE | Age: 62
End: 2022-06-16

## 2022-06-16 ENCOUNTER — OFFICE VISIT (OUTPATIENT)
Dept: URGENT CARE | Facility: URGENT CARE | Age: 62
End: 2022-06-16
Payer: COMMERCIAL

## 2022-06-16 VITALS
DIASTOLIC BLOOD PRESSURE: 84 MMHG | OXYGEN SATURATION: 98 % | BODY MASS INDEX: 27.39 KG/M2 | TEMPERATURE: 98.1 F | HEART RATE: 74 BPM | SYSTOLIC BLOOD PRESSURE: 127 MMHG | WEIGHT: 169.7 LBS | RESPIRATION RATE: 20 BRPM

## 2022-06-16 DIAGNOSIS — R05.9 COUGH: ICD-10-CM

## 2022-06-16 DIAGNOSIS — J06.9 VIRAL UPPER RESPIRATORY TRACT INFECTION WITH COUGH: Primary | ICD-10-CM

## 2022-06-16 DIAGNOSIS — Z20.822 CLOSE EXPOSURE TO 2019 NOVEL CORONAVIRUS: ICD-10-CM

## 2022-06-16 PROCEDURE — 99213 OFFICE O/P EST LOW 20 MIN: CPT | Performed by: PHYSICIAN ASSISTANT

## 2022-06-16 PROCEDURE — U0003 INFECTIOUS AGENT DETECTION BY NUCLEIC ACID (DNA OR RNA); SEVERE ACUTE RESPIRATORY SYNDROME CORONAVIRUS 2 (SARS-COV-2) (CORONAVIRUS DISEASE [COVID-19]), AMPLIFIED PROBE TECHNIQUE, MAKING USE OF HIGH THROUGHPUT TECHNOLOGIES AS DESCRIBED BY CMS-2020-01-R: HCPCS

## 2022-06-16 PROCEDURE — U0005 INFEC AGEN DETEC AMPLI PROBE: HCPCS

## 2022-06-16 RX ORDER — GUAIFENESIN/DEXTROMETHORPHAN 100-10MG/5
10 SYRUP ORAL EVERY 4 HOURS PRN
Qty: 118 ML | Refills: 0 | Status: SHIPPED | OUTPATIENT
Start: 2022-06-16 | End: 2023-06-13

## 2022-06-16 ASSESSMENT — ENCOUNTER SYMPTOMS
WOUND: 0
NECK PAIN: 0
MYALGIAS: 0
CHILLS: 0
BACK PAIN: 0
ALLERGIC/IMMUNOLOGIC NEGATIVE: 1
RHINORRHEA: 1
SHORTNESS OF BREATH: 0
DIZZINESS: 0
EYES NEGATIVE: 1
DIARRHEA: 0
ARTHRALGIAS: 0
NECK STIFFNESS: 0
NAUSEA: 0
MUSCULOSKELETAL NEGATIVE: 1
CARDIOVASCULAR NEGATIVE: 1
COUGH: 1
ENDOCRINE NEGATIVE: 1
WEAKNESS: 0
PALPITATIONS: 0
VOMITING: 0
JOINT SWELLING: 0
SORE THROAT: 0
LIGHT-HEADEDNESS: 0
HEADACHES: 0
FEVER: 0

## 2022-06-16 NOTE — PROGRESS NOTES
Chief Complaint:     Chief Complaint   Patient presents with     URI     Started last Friday with sneezing, itching eyes, runny nose, a couple days ago have a cough, have chills for one day and gone but coughing still (had covid test this morning-awaiting on result)       No results found for any visits on 06/16/22.    Medical Decision Making:    Vital signs reviewed by Lalito Guerrier PA-C  /84 (BP Location: Left arm, Patient Position: Sitting, Cuff Size: Adult Large)   Pulse 74   Temp 98.1  F (36.7  C) (Tympanic)   Resp 20   Wt 77 kg (169 lb 11.2 oz)   LMP 01/13/2012 (Approximate)   SpO2 98%   BMI 27.39 kg/m      Differential Diagnosis:  URI Adult/Peds:  Bronchitis-viral, Influenza, Pneumonia, Sinusitis, Viral syndrome and Viral upper respiratory illness        ASSESSMENT    1. Viral upper respiratory tract infection with cough        PLAN    Patient is in no acute distress.    Temp is 98.1 in clinic today, lung sounds were clear, and O2 sats at 98% on RA.    COVID testing pending.  Rest, Push fluids, vaporizer, elevation of head of bed.  Ibuprofen and or Tylenol for any fever or body aches.  Rx for Robitussin cough suppressant- PRN- as discussed.   If symptoms worsen, recheck immediately otherwise follow up with your PCP in 1 week if symptoms are not improving.  Worrisome symptoms discussed with instructions to go to the ED.  Patient verbalized understanding and agreed with this plan.    Labs:    No results found for any visits on 06/16/22.     Vital signs reviewed by Lalito Guerrier PA-C  /84 (BP Location: Left arm, Patient Position: Sitting, Cuff Size: Adult Large)   Pulse 74   Temp 98.1  F (36.7  C) (Tympanic)   Resp 20   Wt 77 kg (169 lb 11.2 oz)   LMP 01/13/2012 (Approximate)   SpO2 98%   BMI 27.39 kg/m      Current Meds      Current Outpatient Medications:      Adapalene (DIFFERIN EX), , Disp: , Rfl:      famotidine (PEPCID) 40 MG tablet, Take 1 tablet (40 mg) by mouth nightly as  needed for heartburn, Disp: 90 tablet, Rfl: 1     fexofenadine (ALLEGRA) 180 MG tablet, 1 tablet daily for allergy symptoms., Disp: , Rfl:      fluticasone (FLONASE) 50 MCG/ACT nasal spray, Spray 2 sprays into both nostrils daily, Disp: 16 g, Rfl: 11     levothyroxine (SYNTHROID/LEVOTHROID) 75 MCG tablet, Take 1 tablet (75 mcg) by mouth daily, Disp: 90 tablet, Rfl: 3     MULTIVITAMIN OR, Take 1 tablet by mouth daily., Disp: , Rfl:      Omega-3 Fatty Acids (FISH OIL PO), Take 1 tablet by mouth daily as needed., Disp: , Rfl:      Polyvinyl Alcohol-Povidone (REFRESH OP), Apply to eye as needed, Disp: , Rfl:      zolpidem (AMBIEN) 5 MG tablet, Use at bedtime if needed to assist with sleep use sparingly, Disp: 30 tablet, Rfl: 0      Respiratory History    occasional episodes of bronchitis      SUBJECTIVE    HPI: Carolina Baugh is an 62 year old female who presents with chest congestion, cough nonproductive, occasional and nasal discharge clear.  Symptoms began 2  days ago and has unchanged.  There is no shortness of breath, wheezing and chest pain.  Patient is eating and drinking well.  No fever, nausea, vomiting, or diarrhea.    Patient denies any recent travel.  Patient was exposed to known COVID positive tested individual.      ROS:     Review of Systems   Constitutional: Negative for chills and fever.   HENT: Positive for congestion and rhinorrhea. Negative for ear pain and sore throat.    Eyes: Negative.    Respiratory: Positive for cough. Negative for shortness of breath.    Cardiovascular: Negative.  Negative for chest pain and palpitations.   Gastrointestinal: Negative for diarrhea, nausea and vomiting.   Endocrine: Negative.    Genitourinary: Negative.    Musculoskeletal: Negative.  Negative for arthralgias, back pain, joint swelling, myalgias, neck pain and neck stiffness.   Skin: Negative.  Negative for rash and wound.   Allergic/Immunologic: Negative.  Negative for immunocompromised state.   Neurological:  Negative for dizziness, weakness, light-headedness and headaches.         Family History   Family History   Problem Relation Age of Onset     Asthma Mother      Thyroid Disease Sister      Respiratory Sister         sinus, 2 sisters     Cancer No family hx of      Diabetes No family hx of      Hypertension No family hx of      Cerebrovascular Disease No family hx of      Glaucoma No family hx of      Macular Degeneration No family hx of      Melanoma No family hx of      Skin Cancer No family hx of         Problem history  Patient Active Problem List   Diagnosis     Encounter for screening colonoscopy     CARDIOVASCULAR SCREENING; LDL GOAL LESS THAN 160     Sinusitis, chronic     Post-menopausal     Hypothyroidism     Mantoux: positive     Advance care planning     Cervicalgia     Episodic tension-type headache, not intractable     Elevated BP without diagnosis of hypertension        Allergies  No Known Allergies     Social History  Social History     Socioeconomic History     Marital status:      Spouse name: Florentin     Number of children: 4     Years of education: Not on file     Highest education level: Some college, no degree   Occupational History     Not on file   Tobacco Use     Smoking status: Never Smoker     Smokeless tobacco: Never Used     Tobacco comment: exposure to second hand smoke   Vaping Use     Vaping Use: Never used   Substance and Sexual Activity     Alcohol use: No     Drug use: No     Sexual activity: Yes     Partners: Male   Other Topics Concern     Parent/sibling w/ CABG, MI or angioplasty before 65F 55M? No   Social History Narrative     4 children one with special needs born 11/1997. 3 daughters and one son.    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     Social Determinants of Health     Financial Resource Strain: Low Risk      Difficulty of Paying Living Expenses: Not hard at all   Food Insecurity: No Food Insecurity     Worried About Running Out of Food in the  Last Year: Never true     Ran Out of Food in the Last Year: Never true   Transportation Needs: No Transportation Needs     Lack of Transportation (Medical): No     Lack of Transportation (Non-Medical): No   Physical Activity: Sufficiently Active     Days of Exercise per Week: 7 days     Minutes of Exercise per Session: 30 min   Stress: Stress Concern Present     Feeling of Stress : To some extent   Social Connections: Socially Integrated     Frequency of Communication with Friends and Family: More than three times a week     Frequency of Social Gatherings with Friends and Family: More than three times a week     Attends Holiness Services: More than 4 times per year     Active Member of Clubs or Organizations: Yes     Attends Club or Organization Meetings: More than 4 times per year     Marital Status:    Intimate Partner Violence: Not At Risk     Fear of Current or Ex-Partner: No     Emotionally Abused: No     Physically Abused: No     Sexually Abused: No   Housing Stability: Low Risk      Unable to Pay for Housing in the Last Year: No     Number of Places Lived in the Last Year: 1     Unstable Housing in the Last Year: No        OBJECTIVE     Vital signs reviewed by Lalito Guerrier PA-C  /84 (BP Location: Left arm, Patient Position: Sitting, Cuff Size: Adult Large)   Pulse 74   Temp 98.1  F (36.7  C) (Tympanic)   Resp 20   Wt 77 kg (169 lb 11.2 oz)   LMP 01/13/2012 (Approximate)   SpO2 98%   BMI 27.39 kg/m       Physical Exam  Vitals and nursing note reviewed.   Constitutional:       General: She is not in acute distress.     Appearance: She is well-developed. She is not ill-appearing, toxic-appearing or diaphoretic.   HENT:      Head: Normocephalic and atraumatic.      Right Ear: Hearing, tympanic membrane, ear canal and external ear normal. Tympanic membrane is not perforated, erythematous, retracted or bulging.      Left Ear: Hearing, tympanic membrane, ear canal and external ear normal.  Tympanic membrane is not perforated, erythematous, retracted or bulging.      Nose: Congestion present. No mucosal edema or rhinorrhea.      Right Sinus: No maxillary sinus tenderness or frontal sinus tenderness.      Left Sinus: No maxillary sinus tenderness or frontal sinus tenderness.      Mouth/Throat:      Pharynx: No pharyngeal swelling, oropharyngeal exudate, posterior oropharyngeal erythema or uvula swelling.      Tonsils: No tonsillar exudate or tonsillar abscesses. 0 on the right. 0 on the left.   Eyes:      General:         Right eye: No discharge.         Left eye: No discharge.      Pupils: Pupils are equal, round, and reactive to light.   Cardiovascular:      Rate and Rhythm: Normal rate and regular rhythm.      Heart sounds: Normal heart sounds. No murmur heard.    No friction rub. No gallop.   Pulmonary:      Effort: Pulmonary effort is normal. No respiratory distress.      Breath sounds: Normal breath sounds. No decreased breath sounds, wheezing, rhonchi or rales.   Chest:      Chest wall: No tenderness.   Abdominal:      General: Bowel sounds are normal. There is no distension.      Palpations: Abdomen is soft. There is no mass.      Tenderness: There is no abdominal tenderness. There is no guarding.   Musculoskeletal:      Cervical back: Normal range of motion and neck supple.   Lymphadenopathy:      Head:      Right side of head: No submental, submandibular, tonsillar, preauricular or posterior auricular adenopathy.      Left side of head: No submental, submandibular, tonsillar, preauricular or posterior auricular adenopathy.      Cervical: No cervical adenopathy.      Right cervical: No superficial or posterior cervical adenopathy.     Left cervical: No superficial or posterior cervical adenopathy.   Skin:     General: Skin is warm and dry.      Findings: No rash.   Neurological:      Mental Status: She is alert and oriented to person, place, and time.      Cranial Nerves: No cranial nerve  deficit.      Deep Tendon Reflexes: Reflexes are normal and symmetric.   Psychiatric:         Behavior: Behavior normal. Behavior is cooperative.         Thought Content: Thought content normal.         Judgment: Judgment normal.           Lalito Guerrier PA-C  6/16/2022, 1:02 PM

## 2022-06-16 NOTE — TELEPHONE ENCOUNTER
I called and spoke to pharmacy. Pharmacy staff said they already figured it out. Pt will buy it OTC since insurance is not covered it too.     Darinel Lechuga, SOLEDAD

## 2022-06-16 NOTE — TELEPHONE ENCOUNTER
[facility] called because they received the prescription for Robitussin DM but when they attempt to process it it is not letting them.   It state that it is missing the provider's digital signature.      Resend prescription with missing info.      Routing to provider.    Lindsey Jonas RN  [facility]

## 2022-06-17 LAB — SARS-COV-2 RNA RESP QL NAA+PROBE: NEGATIVE

## 2022-07-01 ENCOUNTER — ANCILLARY PROCEDURE (OUTPATIENT)
Dept: MAMMOGRAPHY | Facility: CLINIC | Age: 62
End: 2022-07-01
Attending: FAMILY MEDICINE
Payer: COMMERCIAL

## 2022-07-01 DIAGNOSIS — Z12.31 VISIT FOR SCREENING MAMMOGRAM: ICD-10-CM

## 2022-07-01 PROCEDURE — 77063 BREAST TOMOSYNTHESIS BI: CPT

## 2022-07-01 PROCEDURE — 77067 SCR MAMMO BI INCL CAD: CPT

## 2022-07-05 DIAGNOSIS — K21.9 GASTROESOPHAGEAL REFLUX DISEASE WITHOUT ESOPHAGITIS: ICD-10-CM

## 2022-07-07 NOTE — RESULT ENCOUNTER NOTE
Dear Carolina Baugh   Congratulations. The result of your recent mammogram was normal.  Best wishes,  Teto Mesa MD

## 2022-07-11 RX ORDER — FAMOTIDINE 40 MG/1
40 TABLET, FILM COATED ORAL
Qty: 90 TABLET | Refills: 3 | Status: SHIPPED | OUTPATIENT
Start: 2022-07-11 | End: 2023-06-13

## 2022-10-15 ENCOUNTER — HEALTH MAINTENANCE LETTER (OUTPATIENT)
Age: 62
End: 2022-10-15

## 2022-10-18 ENCOUNTER — IMMUNIZATION (OUTPATIENT)
Dept: FAMILY MEDICINE | Facility: CLINIC | Age: 62
End: 2022-10-18
Payer: COMMERCIAL

## 2022-10-18 DIAGNOSIS — Z23 NEED FOR PROPHYLACTIC VACCINATION AND INOCULATION AGAINST INFLUENZA: Primary | ICD-10-CM

## 2022-10-18 PROCEDURE — 90682 RIV4 VACC RECOMBINANT DNA IM: CPT

## 2022-10-18 PROCEDURE — 90471 IMMUNIZATION ADMIN: CPT

## 2023-02-13 ENCOUNTER — VIRTUAL VISIT (OUTPATIENT)
Dept: FAMILY MEDICINE | Facility: CLINIC | Age: 63
End: 2023-02-13
Payer: COMMERCIAL

## 2023-02-13 DIAGNOSIS — Z11.1 SCREENING EXAMINATION FOR PULMONARY TUBERCULOSIS: Primary | ICD-10-CM

## 2023-02-13 PROCEDURE — 99213 OFFICE O/P EST LOW 20 MIN: CPT | Mod: 95 | Performed by: NURSE PRACTITIONER

## 2023-02-13 NOTE — PROGRESS NOTES
"Carolina is a 63 year old who is being evaluated via a billable telephone visit.      What phone number would you like to be contacted at? [phone]  How would you like to obtain your AVS? Raadharjaya and Mailed a copy  Distant Location (provider location):  On-site    Assessment & Plan     Screening examination for pulmonary tuberculosis  Pgt works as a , needs Quantifoeron gold testing for her employer.  - REVIEW OF HEALTH MAINTENANCE PROTOCOL ORDERS  - Quantiferon TB Gold Plus      Ordering of each unique test  5  minutes spent on the date of the encounter doing chart review, history and exam, documentation and further activities per the note       BMI:   Estimated body mass index is 27.39 kg/m  as calculated from the following:    Height as of 4/18/22: 1.676 m (5' 6\").    Weight as of 6/16/22: 77 kg (169 lb 11.2 oz).     Work on weight loss  Regular exercise  See Patient Instructions    No follow-ups on file.    TRUNG [facility]rez [facility]    Stanton Figueroa is a 63 year old\, presenting for the following health issues:  Tb gold      History of Present Illness       Reason for visit:  TB GOLD for work as an     She eats 2-3 servings of fruits and vegetables daily.She consumes 0 sweetened beverage(s) daily.She exercises with enough effort to increase her heart rate 20 to 29 minutes per day.  She exercises with enough effort to increase her heart rate 3 or less days per week. She is missing 1 dose(s) of medications per week.  She is not taking prescribed medications regularly due to remembering to take.       Patient needs TB gold for work. She has had history of Positive PPD, patient denies BCG vaccine.     No unexplained hoarseness  No loss of appetite  No unexplained weight loss  No productive or prolonged cough  No bloody sputum  Np persistent fever over 100 F  No night sweats  No hemoptysis  No shortness of breath  No unexplained fatigue or " weakness  No chest pain  No recurrent pneumonia  No unprotected exposure to a known TB patient      Review of Systems   Constitutional, HEENT, cardiovascular, pulmonary, gi and gu systems are negative, except as otherwise noted.      Objective    Vitals - Patient Reported  Pain Score: No Pain (0)      Vitals:  No vitals were obtained today due to virtual visit.    Physical Exam   healthy, alert and no distress  PSYCH: Alert and oriented times 3; coherent speech, normal   rate and volume, able to articulate logical thoughts, able   to abstract reason, no tangential thoughts, no hallucinations   or delusions  Her affect is normal and pleasant  RESP: No cough, no audible wheezing, able to talk in full sentences  Remainder of exam unable to be completed due to telephone visits    No results found for this or any previous visit (from the past 24 hour(s)).          Phone call duration: 5 minutes

## 2023-02-15 ENCOUNTER — LAB (OUTPATIENT)
Dept: LAB | Facility: CLINIC | Age: 63
End: 2023-02-15
Payer: COMMERCIAL

## 2023-02-15 DIAGNOSIS — E78.5 HYPERLIPIDEMIA LDL GOAL <100: ICD-10-CM

## 2023-02-15 DIAGNOSIS — Z11.1 SCREENING EXAMINATION FOR PULMONARY TUBERCULOSIS: ICD-10-CM

## 2023-02-15 DIAGNOSIS — E03.4 HYPOTHYROIDISM DUE TO ACQUIRED ATROPHY OF THYROID: ICD-10-CM

## 2023-02-15 LAB
CHOLEST SERPL-MCNC: 262 MG/DL
FASTING STATUS PATIENT QL REPORTED: YES
HDLC SERPL-MCNC: 61 MG/DL
LDLC SERPL CALC-MCNC: 171 MG/DL
NONHDLC SERPL-MCNC: 201 MG/DL
T4 FREE SERPL-MCNC: 1.26 NG/DL (ref 0.76–1.46)
TRIGL SERPL-MCNC: 150 MG/DL
TSH SERPL DL<=0.005 MIU/L-ACNC: 4.9 MU/L (ref 0.4–4)

## 2023-02-15 PROCEDURE — 86481 TB AG RESPONSE T-CELL SUSP: CPT

## 2023-02-15 PROCEDURE — 84439 ASSAY OF FREE THYROXINE: CPT

## 2023-02-15 PROCEDURE — 36415 COLL VENOUS BLD VENIPUNCTURE: CPT

## 2023-02-15 PROCEDURE — 80061 LIPID PANEL: CPT

## 2023-02-15 PROCEDURE — 84443 ASSAY THYROID STIM HORMONE: CPT

## 2023-02-17 LAB
GAMMA INTERFERON BACKGROUND BLD IA-ACNC: 0.02 IU/ML
M TB IFN-G BLD-IMP: NEGATIVE
M TB IFN-G CD4+ BCKGRND COR BLD-ACNC: 9.98 IU/ML
MITOGEN IGNF BCKGRD COR BLD-ACNC: 0 IU/ML
MITOGEN IGNF BCKGRD COR BLD-ACNC: 0.03 IU/ML
QUANTIFERON MITOGEN: 10 IU/ML
QUANTIFERON NIL TUBE: 0.02 IU/ML
QUANTIFERON TB1 TUBE: 0.05 IU/ML
QUANTIFERON TB2 TUBE: 0.02

## 2023-02-21 DIAGNOSIS — E03.9 ACQUIRED HYPOTHYROIDISM: Primary | ICD-10-CM

## 2023-02-21 RX ORDER — LEVOTHYROXINE SODIUM 100 UG/1
100 TABLET ORAL DAILY
Qty: 60 TABLET | Refills: 1 | Status: SHIPPED | OUTPATIENT
Start: 2023-02-21 | End: 2023-06-15

## 2023-05-27 ENCOUNTER — HEALTH MAINTENANCE LETTER (OUTPATIENT)
Age: 63
End: 2023-05-27

## 2023-06-12 ENCOUNTER — OFFICE VISIT (OUTPATIENT)
Dept: FAMILY MEDICINE | Facility: CLINIC | Age: 63
End: 2023-06-12
Payer: COMMERCIAL

## 2023-06-12 ENCOUNTER — LAB (OUTPATIENT)
Dept: LAB | Facility: CLINIC | Age: 63
End: 2023-06-12
Payer: COMMERCIAL

## 2023-06-12 VITALS
DIASTOLIC BLOOD PRESSURE: 75 MMHG | TEMPERATURE: 97.6 F | BODY MASS INDEX: 27.72 KG/M2 | WEIGHT: 172.5 LBS | HEIGHT: 66 IN | SYSTOLIC BLOOD PRESSURE: 113 MMHG | RESPIRATION RATE: 12 BRPM | OXYGEN SATURATION: 98 % | HEART RATE: 69 BPM

## 2023-06-12 DIAGNOSIS — E03.4 HYPOTHYROIDISM DUE TO ACQUIRED ATROPHY OF THYROID: ICD-10-CM

## 2023-06-12 DIAGNOSIS — Z12.11 COLON CANCER SCREENING: ICD-10-CM

## 2023-06-12 DIAGNOSIS — E78.5 HYPERLIPIDEMIA LDL GOAL <100: ICD-10-CM

## 2023-06-12 DIAGNOSIS — Z12.4 SCREENING FOR CERVICAL CANCER: ICD-10-CM

## 2023-06-12 DIAGNOSIS — H61.23 BILATERAL IMPACTED CERUMEN: ICD-10-CM

## 2023-06-12 DIAGNOSIS — R73.03 PRE-DIABETES: ICD-10-CM

## 2023-06-12 DIAGNOSIS — Z00.00 PREVENTATIVE HEALTH CARE: Primary | ICD-10-CM

## 2023-06-12 DIAGNOSIS — Z23 ENCOUNTER FOR IMMUNIZATION: ICD-10-CM

## 2023-06-12 DIAGNOSIS — Z12.31 VISIT FOR SCREENING MAMMOGRAM: ICD-10-CM

## 2023-06-12 DIAGNOSIS — Z00.00 PREVENTATIVE HEALTH CARE: ICD-10-CM

## 2023-06-12 LAB
ALBUMIN SERPL BCG-MCNC: 4.1 G/DL (ref 3.5–5.2)
ALP SERPL-CCNC: 74 U/L (ref 35–104)
ALT SERPL W P-5'-P-CCNC: 23 U/L (ref 10–35)
ANION GAP SERPL CALCULATED.3IONS-SCNC: 7 MMOL/L (ref 7–15)
AST SERPL W P-5'-P-CCNC: 22 U/L (ref 10–35)
BILIRUB SERPL-MCNC: 0.3 MG/DL
BUN SERPL-MCNC: 10.5 MG/DL (ref 8–23)
CALCIUM SERPL-MCNC: 9.3 MG/DL (ref 8.8–10.2)
CHLORIDE SERPL-SCNC: 106 MMOL/L (ref 98–107)
CHOLEST SERPL-MCNC: 252 MG/DL
CREAT SERPL-MCNC: 0.84 MG/DL (ref 0.51–0.95)
DEPRECATED HCO3 PLAS-SCNC: 28 MMOL/L (ref 22–29)
GFR SERPL CREATININE-BSD FRML MDRD: 77 ML/MIN/1.73M2
GLUCOSE SERPL-MCNC: 99 MG/DL (ref 70–99)
HBA1C MFR BLD: 5.9 %
HDLC SERPL-MCNC: 58 MG/DL
LDLC SERPL CALC-MCNC: 174 MG/DL
NONHDLC SERPL-MCNC: 194 MG/DL
POTASSIUM SERPL-SCNC: 4.2 MMOL/L (ref 3.4–5.3)
PROT SERPL-MCNC: 7.2 G/DL (ref 6.4–8.3)
SODIUM SERPL-SCNC: 141 MMOL/L (ref 136–145)
TRIGL SERPL-MCNC: 102 MG/DL
TSH SERPL DL<=0.005 MIU/L-ACNC: 0.63 UIU/ML (ref 0.3–4.2)

## 2023-06-12 PROCEDURE — 80053 COMPREHEN METABOLIC PANEL: CPT | Performed by: PATHOLOGY

## 2023-06-12 PROCEDURE — 91312 COVID-19 BIVALENT 12+ (PFIZER): CPT | Performed by: FAMILY MEDICINE

## 2023-06-12 PROCEDURE — 36415 COLL VENOUS BLD VENIPUNCTURE: CPT | Performed by: PATHOLOGY

## 2023-06-12 PROCEDURE — G0145 SCR C/V CYTO,THINLAYER,RESCR: HCPCS | Performed by: FAMILY MEDICINE

## 2023-06-12 PROCEDURE — 99396 PREV VISIT EST AGE 40-64: CPT | Mod: 25 | Performed by: FAMILY MEDICINE

## 2023-06-12 PROCEDURE — 80061 LIPID PANEL: CPT | Performed by: PATHOLOGY

## 2023-06-12 PROCEDURE — 84443 ASSAY THYROID STIM HORMONE: CPT | Performed by: PATHOLOGY

## 2023-06-12 PROCEDURE — 87624 HPV HI-RISK TYP POOLED RSLT: CPT | Performed by: FAMILY MEDICINE

## 2023-06-12 PROCEDURE — 83036 HEMOGLOBIN GLYCOSYLATED A1C: CPT | Performed by: FAMILY MEDICINE

## 2023-06-12 PROCEDURE — 0121A COVID-19 BIVALENT 12+ (PFIZER): CPT | Performed by: FAMILY MEDICINE

## 2023-06-12 ASSESSMENT — ENCOUNTER SYMPTOMS
EYE IRRITATION: 1
DEPRESSION: 0
EYE PAIN: 1
EYE REDNESS: 0
PANIC: 0
DECREASED CONCENTRATION: 0
NERVOUS/ANXIOUS: 1
DOUBLE VISION: 0
INSOMNIA: 1
EYE WATERING: 0

## 2023-06-12 NOTE — NURSING NOTE
"Carolina Baugh is a 63 year old female patient that presents today in clinic for the following:    Chief Complaint   Patient presents with     Physical     Pt here for annual physical.     The patient's allergies and medications were reviewed as noted. A set of vitals were recorded as noted without incident: /75 (BP Location: Right arm, Patient Position: Sitting, Cuff Size: Adult Regular)   Pulse 69   Temp 97.6  F (36.4  C)   Resp 12   Ht 1.679 m (5' 6.1\")   Wt 78.2 kg (172 lb 8 oz)   LMP 01/13/2012 (Approximate)   SpO2 98%   BMI 27.76 kg/m  . The patient does not have any other questions for the provider.    Karol Sanabria, EMT at 3:36 PM on 6/12/2023  "

## 2023-06-12 NOTE — PATIENT INSTRUCTIONS
Immunizations  Reviewed could have shingrix through local pharmacy  Immunization History   Administered Date(s) Administered    COVID-19 MONOVALENT 12+ (Pfizer) 11/10/2021    COVID-19 Monovalent 12+ (Pfizer 2022) 04/18/2022    COVID-19 Monovalent 18+ (Moderna) 01/16/2021, 02/13/2021    Hepatitis B, Adult 08/24/2017, 09/25/2017    Influenza (H1N1) 12/21/2009    Influenza (IIV3) PF 01/19/1999, 11/10/2003, 12/16/2005, 12/18/2006, 10/31/2007, 10/12/2010, 10/18/2011, 10/13/2012    Influenza Vaccine 50-64 or 18-64 w/egg allergy (Flublok) 10/18/2018, 10/23/2019, 09/22/2020, 11/08/2021, 10/18/2022    Influenza Vaccine >6 months (Alfuria,Fluzone) 10/03/2013, 10/23/2014, 11/03/2015, 10/20/2016, 09/25/2017    MMR 06/10/2013, 04/18/2018    Mantoux Tuberculin Skin Test 09/19/2014    Meningococcal ACWY (Menactra ) 05/30/2007    Pneumococcal (PCV 7) 12/16/2005    Poliovirus, inactivated (IPV) 05/30/2007    TD,PF 7+ (Tenivac) 09/25/2017    TDAP Vaccine (Adacel) 05/30/2007    Twinrix A/B 05/30/2007    Typhoid IM 05/30/2007    Yellow Fever 05/30/2007
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
## 2023-06-13 PROBLEM — E78.5 HYPERLIPIDEMIA LDL GOAL <100: Status: ACTIVE | Noted: 2023-06-13

## 2023-06-13 PROBLEM — R73.03 PRE-DIABETES: Status: ACTIVE | Noted: 2023-06-13

## 2023-06-14 DIAGNOSIS — E03.4 HYPOTHYROIDISM DUE TO ACQUIRED ATROPHY OF THYROID: ICD-10-CM

## 2023-06-14 DIAGNOSIS — E03.9 ACQUIRED HYPOTHYROIDISM: ICD-10-CM

## 2023-06-14 NOTE — RESULT ENCOUNTER NOTE
Dear Carolina Baugh   I tried to call you but was not able to leave a message as one number seemed to be a fax machine.  Your three month measure of glucose control A1C 5.9 was in pre-diabetes range.    Lower portion size, lower carbohydrate choices ( less bread, pasta, rice, potatoes, sugar containing foods and eliminate sugar containing beverages.) Also important is daily physical activity such as walking or stationary bike 30 minutes daily.    Cholesterol was slightly improved but LDL still high. Risk score calculation is 6.6 % goal is less than 7.5% therefore could continue with lifestyle change lower fat and cholesterol in the diet.  Thyroid was in range on current medication.   The remainder of results are normal.  Recheck in 3-6 months or make a follow-up appt to review.  Best wishes,  Teto Mesa MD

## 2023-06-15 RX ORDER — LEVOTHYROXINE SODIUM 100 UG/1
TABLET ORAL
Qty: 90 TABLET | Refills: 3 | Status: SHIPPED | OUTPATIENT
Start: 2023-06-15 | End: 2024-06-10

## 2023-06-16 LAB
BKR LAB AP GYN ADEQUACY: NORMAL
BKR LAB AP GYN INTERPRETATION: NORMAL
BKR LAB AP HPV REFLEX: NORMAL
BKR LAB AP PREVIOUS ABNORMAL: NORMAL
PATH REPORT.COMMENTS IMP SPEC: NORMAL
PATH REPORT.COMMENTS IMP SPEC: NORMAL
PATH REPORT.RELEVANT HX SPEC: NORMAL

## 2023-06-19 LAB
HUMAN PAPILLOMA VIRUS 16 DNA: NEGATIVE
HUMAN PAPILLOMA VIRUS 18 DNA: NEGATIVE
HUMAN PAPILLOMA VIRUS FINAL DIAGNOSIS: NORMAL
HUMAN PAPILLOMA VIRUS OTHER HR: NEGATIVE

## 2023-06-19 RX ORDER — LEVOTHYROXINE SODIUM 75 UG/1
TABLET ORAL
Qty: 90 TABLET | Refills: 3 | OUTPATIENT
Start: 2023-06-19

## 2023-06-19 RX ORDER — FAMOTIDINE 40 MG/1
TABLET, FILM COATED ORAL
Qty: 90 TABLET | Refills: 0 | OUTPATIENT
Start: 2023-06-19

## 2023-06-19 NOTE — TELEPHONE ENCOUNTER
FAMOTIDINE 40MG TABS      NOT ON MEDICATION LIST    Last Office Visit : 6-  Future Office visit:  6-    Routing refill request to provider for review/approval because:  NOT ON MEDICATION LIST      Kathleen M Doege RN

## 2023-06-20 NOTE — RESULT ENCOUNTER NOTE
Dear Carolina A Moume    PAP is normal NIL and negative HPV. You may discontinue PAP screening. We recommend annual visit.  Best wishes,  Teto Mesa MD

## 2023-06-27 ENCOUNTER — ANCILLARY PROCEDURE (OUTPATIENT)
Dept: MAMMOGRAPHY | Facility: CLINIC | Age: 63
End: 2023-06-27
Attending: FAMILY MEDICINE
Payer: COMMERCIAL

## 2023-06-27 DIAGNOSIS — Z12.31 VISIT FOR SCREENING MAMMOGRAM: ICD-10-CM

## 2023-06-27 PROCEDURE — 77063 BREAST TOMOSYNTHESIS BI: CPT | Mod: GC | Performed by: RADIOLOGY

## 2023-06-27 PROCEDURE — 77067 SCR MAMMO BI INCL CAD: CPT | Mod: GC | Performed by: RADIOLOGY

## 2023-07-02 ENCOUNTER — MYC REFILL (OUTPATIENT)
Dept: FAMILY MEDICINE | Facility: CLINIC | Age: 63
End: 2023-07-02
Payer: COMMERCIAL

## 2023-07-02 DIAGNOSIS — G47.00 PERSISTENT INSOMNIA: ICD-10-CM

## 2023-07-05 RX ORDER — ZOLPIDEM TARTRATE 5 MG/1
TABLET ORAL
Qty: 30 TABLET | Refills: 1 | Status: SHIPPED | OUTPATIENT
Start: 2023-07-05 | End: 2024-06-10

## 2023-07-05 NOTE — TELEPHONE ENCOUNTER
PDMP Review       Value Time User    State PDMP site checked  Yes 7/5/2023 11:35 AM Teto Mesa MD [place] chart reviewed today for refill request.    Diagnoses and all orders for this visit:    Persistent insomnia  -     zolpidem (AMBIEN) 5 MG tablet; Use at bedtime if needed to assist with sleep use sparingly    #30 1 RF  Teto Mesa MD

## 2023-07-07 PROCEDURE — 82274 ASSAY TEST FOR BLOOD FECAL: CPT | Performed by: FAMILY MEDICINE

## 2023-07-07 PROCEDURE — 99000 SPECIMEN HANDLING OFFICE-LAB: CPT | Performed by: PATHOLOGY

## 2023-07-08 LAB — HEMOCCULT STL QL IA: NEGATIVE

## 2023-07-10 NOTE — RESULT ENCOUNTER NOTE
Dear Carolina Baugh    Additional result back.  Congratulations.  Your test for fecal (stool) occult blood colon cancer screen was negative, good result.  Best wishes,  Teto Mesa MD

## 2023-07-10 NOTE — TELEPHONE ENCOUNTER
FAMOTIDINE 40MG TABS      Last Written Prescription Date:  7/11/22  Last Fill Quantity: 90,   # refills: 3  Last Office Visit : 6/12/23  Future Office visit:  6/10/24    Routing refill request to provider for review/approval because:  Drug not active on patient's medication list - end date 6/13/23

## 2023-07-11 RX ORDER — FAMOTIDINE 40 MG/1
TABLET, FILM COATED ORAL
Qty: 90 TABLET | OUTPATIENT
Start: 2023-07-11

## 2023-08-08 ENCOUNTER — OFFICE VISIT (OUTPATIENT)
Dept: OPHTHALMOLOGY | Facility: CLINIC | Age: 63
End: 2023-08-08
Attending: STUDENT IN AN ORGANIZED HEALTH CARE EDUCATION/TRAINING PROGRAM
Payer: COMMERCIAL

## 2023-08-08 DIAGNOSIS — H52.223 HYPEROPIA OF BOTH EYES WITH REGULAR ASTIGMATISM AND PRESBYOPIA: ICD-10-CM

## 2023-08-08 DIAGNOSIS — H52.03 HYPEROPIA OF BOTH EYES WITH REGULAR ASTIGMATISM AND PRESBYOPIA: ICD-10-CM

## 2023-08-08 DIAGNOSIS — H25.13 NUCLEAR SCLEROTIC CATARACT OF BOTH EYES: Primary | ICD-10-CM

## 2023-08-08 DIAGNOSIS — H04.123 DRY EYE SYNDROME, BILATERAL: ICD-10-CM

## 2023-08-08 DIAGNOSIS — H02.88B MEIBOMIAN GLAND DYSFUNCTION (MGD) OF UPPER AND LOWER LIDS OF BOTH EYES: ICD-10-CM

## 2023-08-08 DIAGNOSIS — H52.4 HYPEROPIA OF BOTH EYES WITH REGULAR ASTIGMATISM AND PRESBYOPIA: ICD-10-CM

## 2023-08-08 DIAGNOSIS — H02.88A MEIBOMIAN GLAND DYSFUNCTION (MGD) OF UPPER AND LOWER LIDS OF BOTH EYES: ICD-10-CM

## 2023-08-08 PROCEDURE — 92014 COMPRE OPH EXAM EST PT 1/>: CPT | Performed by: STUDENT IN AN ORGANIZED HEALTH CARE EDUCATION/TRAINING PROGRAM

## 2023-08-08 ASSESSMENT — CONF VISUAL FIELD
OD_NORMAL: 1
OD_INFERIOR_TEMPORAL_RESTRICTION: 0
OS_SUPERIOR_NASAL_RESTRICTION: 0
OS_INFERIOR_TEMPORAL_RESTRICTION: 0
OD_SUPERIOR_NASAL_RESTRICTION: 0
METHOD: COUNTING FINGERS
OS_NORMAL: 1
OS_SUPERIOR_TEMPORAL_RESTRICTION: 0
OD_INFERIOR_NASAL_RESTRICTION: 0
OS_INFERIOR_NASAL_RESTRICTION: 0
OD_SUPERIOR_TEMPORAL_RESTRICTION: 0

## 2023-08-08 ASSESSMENT — VISUAL ACUITY
OD_SC+: -1
OS_SC: 20/70
OD_SC: 20/60
OD_PH_SC: 20/40
OD_SC: J16
OS_SC: J16
OS_PH_SC: 20/30
OS_SC+: -2
METHOD: SNELLEN - LINEAR

## 2023-08-08 ASSESSMENT — SLIT LAMP EXAM - LIDS
COMMENTS: 1+ MGD
COMMENTS: 1+ MGD

## 2023-08-08 ASSESSMENT — REFRACTION_MANIFEST
OD_AXIS: 010
OS_CYLINDER: +0.50
OS_AXIS: 140
OD_ADD: +2.50
OS_ADD: +2.50
OD_CYLINDER: +0.50
OD_SPHERE: +1.00
OS_SPHERE: +1.25

## 2023-08-08 ASSESSMENT — TONOMETRY
IOP_METHOD: APPLANATION
OS_IOP_MMHG: 15
OD_IOP_MMHG: 15

## 2023-08-08 ASSESSMENT — CUP TO DISC RATIO
OD_RATIO: 0.3
OS_RATIO: 0.25

## 2023-08-08 ASSESSMENT — EXTERNAL EXAM - RIGHT EYE: OD_EXAM: NORMAL

## 2023-08-08 ASSESSMENT — EXTERNAL EXAM - LEFT EYE: OS_EXAM: NORMAL

## 2023-08-08 NOTE — PROGRESS NOTES
Current Eye Medications:  lubricant drop and gel - both eyes PRN dryness     Subjective:  comprehensive eye exam - vision decline I both distance and near both eyes - only wears OTC readers for up close work.  Has intermittent discomfort left>right - uses lubricant PRN.  Onset of constant floaters left eye since Friday, can vary in quantity - no flashes of lights, no changes in vision.       Objective:  See Ophthalmology Exam.       Assessment:  Carolina Baugh is a 63 year old female who presents with:   Encounter Diagnoses   Name Primary?    Nuclear sclerotic cataract of both eyes Yes    Meibomian gland dysfunction (MGD) of upper and lower lids of both eyes     Dry eye syndrome, bilateral     Hyperopia of both eyes with regular astigmatism and presbyopia        Plan:  Continue gel tears up to four times a day in both eyes     Glasses prescription given    Carmen Fishman MD  [phone]

## 2023-08-08 NOTE — LETTER
8/8/2023         RE: Carolina Baugh  [address]  [place] [address]        Dear Colleague,    Thank you for referring your patient, Carolina Baugh, to the [facility]. Please see a copy of my visit note below.     Current Eye Medications:  lubricant drop and gel - both eyes PRN dryness     Subjective:  comprehensive eye exam - vision decline I both distance and near both eyes - only wears OTC readers for up close work.  Has intermittent discomfort left>right - uses lubricant PRN.  Onset of constant floaters left eye since Friday, can vary in quantity - no flashes of lights, no changes in vision.       Objective:  See Ophthalmology Exam.       Assessment:  Carolina Baugh is a 63 year old female who presents with:   Encounter Diagnoses   Name Primary?     Nuclear sclerotic cataract of both eyes Yes     Meibomian gland dysfunction (MGD) of upper and lower lids of both eyes      Dry eye syndrome, bilateral      Hyperopia of both eyes with regular astigmatism and presbyopia        Plan:  Continue gel tears up to four times a day in both eyes     Glasses prescription given    Carmen Fishman MD  [phone]        Again, thank you for allowing me to participate in the care of your patient.        Sincerely,        Carmen Fishman MD

## 2023-08-08 NOTE — PATIENT INSTRUCTIONS
Continue gel tears up to four times a day in both eyes     Glasses prescription given    Carmen Fishman MD  [phone]

## 2023-08-28 ENCOUNTER — TELEPHONE (OUTPATIENT)
Dept: OPHTHALMOLOGY | Facility: CLINIC | Age: 63
End: 2023-08-28
Payer: COMMERCIAL

## 2023-08-28 NOTE — TELEPHONE ENCOUNTER
[facility] Call Center    Phone Message    May a detailed message be left on voicemail: yes     Reason for Call: Form or Letter   Type or form/letter needing completion: Glasses RX  Provider: Dr Fishman  Date form needed: ASAP  Once completed: Fax form to: [phone], [facility]    Patient is currently at [facility] and they won't accept her RX glasses prescription via [employer]. Spouse Clyde would like us to fax prescription ASAP. Thank you.    Action Taken: Message routed to:  Other: [facility]    Travel Screening: Not Applicable

## 2023-10-02 ENCOUNTER — IMMUNIZATION (OUTPATIENT)
Dept: FAMILY MEDICINE | Facility: CLINIC | Age: 63
End: 2023-10-02
Payer: COMMERCIAL

## 2023-10-02 DIAGNOSIS — Z23 ENCOUNTER FOR IMMUNIZATION: Primary | ICD-10-CM

## 2023-10-02 PROCEDURE — 90471 IMMUNIZATION ADMIN: CPT

## 2023-10-02 PROCEDURE — 90480 ADMN SARSCOV2 VAC 1/ONLY CMP: CPT

## 2023-10-02 PROCEDURE — 90682 RIV4 VACC RECOMBINANT DNA IM: CPT

## 2023-10-02 PROCEDURE — 91320 SARSCV2 VAC 30MCG TRS-SUC IM: CPT

## 2023-10-02 PROCEDURE — 99207 PR NO CHARGE NURSE ONLY: CPT

## 2023-10-02 NOTE — PROGRESS NOTES
Screening Questionnaire for Adult Immunization    Are you sick today?   No   Do you have allergies to medications, food, a vaccine component or latex?   No   Have you ever had a serious reaction after receiving a vaccination?   No   Do you have a long-term health problem with heart, lung, kidney, or metabolic disease (e.g., diabetes), asthma, a blood disorder, no spleen, complement component deficiency, a cochlear implant, or a spinal fluid leak?  Are you on long-term aspirin therapy?   No   Do you have cancer, leukemia, HIV/AIDS, or any other immune system problem?   No   Do you have a parent, brother, or sister with an immune system problem?   No   In the past 3 months, have you taken medications that affect  your immune system, such as prednisone, other steroids, or anticancer drugs; drugs for the treatment of rheumatoid arthritis, Crohn s disease, or psoriasis; or have you had radiation treatments?   No   Have you had a seizure, or a brain or other nervous system problem?   No   During the past year, have you received a transfusion of blood or blood    products, or been given immune (gamma) globulin or antiviral drug?   No   For women: Are you pregnant or is there a chance you could become       pregnant during the next month?   No   Have you received any vaccinations in the past 4 weeks?   No     Immunization questionnaire answers were all negative.    I have reviewed the following standing orders:   This patient is due and qualifies for the Covid-19 vaccine.     Click here for COVID-19 Standing Order    I have reviewed the vaccines inclusion and exclusion criteria; No concerns regarding eligibility.     This patient is due and qualifies for the Influenza vaccine.    Click here for Influenza Vaccine Standing Order    I have reviewed the vaccines inclusion and exclusion criteria; No concerns regarding eligibility.     Patient instructed to remain in clinic for 15 minutes afterwards, and to report any adverse  reactions.     Screening performed by Renetta Marina MA on 10/2/2023 at 10:33 AM.     femur fracture

## 2024-01-25 ENCOUNTER — TELEPHONE (OUTPATIENT)
Dept: OPHTHALMOLOGY | Facility: CLINIC | Age: 64
End: 2024-01-25
Payer: COMMERCIAL

## 2024-01-25 NOTE — TELEPHONE ENCOUNTER
I attempted to call the patient again, but had to leave a voicemail stating I would add her to Dr. Fishman's schedule on 1-30-24 at 8:10.  I explained that Dr. Fishman has very limited appointment times, and if this appointment date/time did not work, she would need to call the appointment line.

## 2024-01-25 NOTE — TELEPHONE ENCOUNTER
[facility] Call Center    Phone Message    May a detailed message be left on voicemail: yes     Reason for Call: Other: Patient does not want to wait until the end of February for an appointment with Dr. Fishman.  She is requesting a call back from someone on the care team to discuss other options.  She is available at anytime for a call at [phone].  Thank you!      Action Taken: Other: [facility]     Travel Screening: Not Applicable

## 2024-01-25 NOTE — TELEPHONE ENCOUNTER
Attempted to call the patient, but had to leave a message.  I requesting she call the appointment line to schedule an appointment with Dr. Fishman.

## 2024-01-25 NOTE — TELEPHONE ENCOUNTER
[facility] Call Center    Phone Message    May a detailed message be left on voicemail: yes     Reason for Call: Symptoms or Concerns     If patient has red-flag symptoms, warm transfer to triage line    Current symptom or concern: Pt states that for about a month now she has had Lt eye pain, dryness, and sometimes has crusting and redness. She says the eyedrops don't seem to be helping.    Symptoms have been present for:  1 month(s)    Has patient previously been seen for this? Yes    By : Dr. Fishman    Date: 8/8/2023    Are there any new or worsening symptoms? No    Action Taken: Message routed to:  Other: [facility]    Travel Screening: Not Applicable

## 2024-01-30 ENCOUNTER — OFFICE VISIT (OUTPATIENT)
Dept: OPHTHALMOLOGY | Facility: CLINIC | Age: 64
End: 2024-01-30
Payer: COMMERCIAL

## 2024-01-30 DIAGNOSIS — H01.02A SQUAMOUS BLEPHARITIS OF UPPER AND LOWER EYELIDS OF BOTH EYES: ICD-10-CM

## 2024-01-30 DIAGNOSIS — H04.123 DRY EYE SYNDROME, BILATERAL: ICD-10-CM

## 2024-01-30 DIAGNOSIS — H01.02B SQUAMOUS BLEPHARITIS OF UPPER AND LOWER EYELIDS OF BOTH EYES: ICD-10-CM

## 2024-01-30 DIAGNOSIS — H02.88B MEIBOMIAN GLAND DYSFUNCTION (MGD) OF UPPER AND LOWER LIDS OF BOTH EYES: ICD-10-CM

## 2024-01-30 DIAGNOSIS — H02.88A MEIBOMIAN GLAND DYSFUNCTION (MGD) OF UPPER AND LOWER LIDS OF BOTH EYES: ICD-10-CM

## 2024-01-30 DIAGNOSIS — H25.13 NUCLEAR SCLEROTIC CATARACT OF BOTH EYES: Primary | ICD-10-CM

## 2024-01-30 PROCEDURE — 92012 INTRM OPH EXAM EST PATIENT: CPT | Performed by: STUDENT IN AN ORGANIZED HEALTH CARE EDUCATION/TRAINING PROGRAM

## 2024-01-30 RX ORDER — KETOTIFEN FUMARATE 0.35 MG/ML
1 SOLUTION/ DROPS OPHTHALMIC 2 TIMES DAILY
Qty: 10 ML | Refills: 3 | Status: SHIPPED | OUTPATIENT
Start: 2024-01-30

## 2024-01-30 RX ORDER — ERYTHROMYCIN 5 MG/G
0.5 OINTMENT OPHTHALMIC AT BEDTIME
Qty: 3.5 G | Refills: 6 | Status: SHIPPED | OUTPATIENT
Start: 2024-01-30 | End: 2024-06-10

## 2024-01-30 ASSESSMENT — REFRACTION_WEARINGRX
OS_ADD: +2.50
OS_CYLINDER: +0.50
OD_SPHERE: +1.00
OS_AXIS: 140
OD_CYLINDER: +0.50
OS_SPHERE: +1.25
OD_AXIS: 010
OD_ADD: +2.50

## 2024-01-30 ASSESSMENT — VISUAL ACUITY
OS_CC+: -2
CORRECTION_TYPE: GLASSES
OS_CC: 20/25
METHOD: SNELLEN - LINEAR
OD_CC: 20/30
OD_CC+: -2

## 2024-01-30 ASSESSMENT — REFRACTION_MANIFEST
OS_SPHERE: +1.00
OD_ADD: +2.50
OS_CYLINDER: +0.75
OD_SPHERE: +0.75
OD_CYLINDER: +0.25
OS_ADD: +2.50
OS_AXIS: 140
OD_AXIS: 004

## 2024-01-30 ASSESSMENT — SLIT LAMP EXAM - LIDS
COMMENTS: 1+ MGD
COMMENTS: 1+ MGD

## 2024-01-30 ASSESSMENT — EXTERNAL EXAM - RIGHT EYE: OD_EXAM: NORMAL

## 2024-01-30 ASSESSMENT — EXTERNAL EXAM - LEFT EYE: OS_EXAM: NORMAL

## 2024-01-30 NOTE — PATIENT INSTRUCTIONS
Try erythromycin ointment at bedtime on the lashes (ok in the eye, too) left eye     May try Zaditor twice a day as needed for eye itchiness (over-the-counter eyedrop).     Continue warm compresses on the eyes daily    Continue lubricating with gel tears throughout the day    Ok to use +1.25 over the counter glasses for distance and stronger for reading     Carmen Fishman MD  [phone]

## 2024-01-30 NOTE — PROGRESS NOTES
Current Eye Medications:  Gel drops every 2 hours     Subjective: Here for evaluation of eye pain. Patient complains of intermittent eye pain in left eye. Started worsening about 1 month ago. Pain described as burning and sometimes itching. Sometimes left eye is crusty when waking up in the morning. Patient has been doing warm compresses and cold compresses over eyes. Gel drops are not offering any relief. Patient is leaving the country tonight and wants to make sure eyes are healthy. Overall vision is okay. Vision in left eye is intermittently blurry.      Objective:  See Ophthalmology Exam.       Assessment:  Carolina Baugh is a 64 year old female who presents with:   Encounter Diagnoses   Name Primary?    Nuclear sclerotic cataract of both eyes Yes    Meibomian gland dysfunction (MGD) of upper and lower lids of both eyes     Dry eye syndrome, bilateral     Squamous blepharitis of upper and lower eyelids of both eyes        Plan:  Try erythromycin ointment at bedtime on the lashes (ok in the eye, too) left eye     May try Zaditor twice a day as needed for eye itchiness (over-the-counter eyedrop).     Continue warm compresses on the eyes daily    Continue lubricating with gel tears throughout the day    Ok to use +1.25 over the counter glasses for distance and stronger for reading (prescription for distance only glasses given today - she may opt to have them remade at [facility])    Carmen Fishman MD  [phone]

## 2024-01-30 NOTE — LETTER
1/30/2024         RE: Carolina Baugh  [address]  [place] [address]        Dear Colleague,    Thank you for referring your patient, Carolina Baugh, to the [facility]. Please see a copy of my visit note below.     Current Eye Medications:  Gel drops every 2 hours     Subjective: Here for evaluation of eye pain. Patient complains of intermittent eye pain in left eye. Started worsening about 1 month ago. Pain described as burning and sometimes itching. Sometimes left eye is crusty when waking up in the morning. Patient has been doing warm compresses and cold compresses over eyes. Gel drops are not offering any relief. Patient is leaving the country tonight and wants to make sure eyes are healthy. Overall vision is okay. Vision in left eye is intermittently blurry.      Objective:  See Ophthalmology Exam.       Assessment:  Carolina Baugh is a 64 year old female who presents with:   Encounter Diagnoses   Name Primary?     Nuclear sclerotic cataract of both eyes Yes     Meibomian gland dysfunction (MGD) of upper and lower lids of both eyes      Dry eye syndrome, bilateral      Squamous blepharitis of upper and lower eyelids of both eyes        Plan:  Try erythromycin ointment at bedtime on the lashes (ok in the eye, too) left eye     May try Zaditor twice a day as needed for eye itchiness (over-the-counter eyedrop).     Continue warm compresses on the eyes daily    Continue lubricating with gel tears throughout the day    Ok to use +1.25 over the counter glasses for distance and stronger for reading (prescription for distance only glasses given today - she may opt to have them remade at [facility])    Carmen Fishman MD  [phone]     Again, thank you for allowing me to participate in the care of your patient.        Sincerely,        Carmen Fishman MD
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
## 2024-06-10 ENCOUNTER — LAB (OUTPATIENT)
Dept: LAB | Facility: CLINIC | Age: 64
End: 2024-06-10
Payer: COMMERCIAL

## 2024-06-10 ENCOUNTER — OFFICE VISIT (OUTPATIENT)
Dept: FAMILY MEDICINE | Facility: CLINIC | Age: 64
End: 2024-06-10
Payer: COMMERCIAL

## 2024-06-10 VITALS
OXYGEN SATURATION: 97 % | TEMPERATURE: 97.9 F | HEIGHT: 66 IN | DIASTOLIC BLOOD PRESSURE: 85 MMHG | BODY MASS INDEX: 27.71 KG/M2 | WEIGHT: 172.4 LBS | SYSTOLIC BLOOD PRESSURE: 136 MMHG | HEART RATE: 68 BPM

## 2024-06-10 DIAGNOSIS — Z00.01 ENCOUNTER FOR PREVENTATIVE ADULT HEALTH CARE EXAM WITH ABNORMAL FINDINGS: Primary | ICD-10-CM

## 2024-06-10 DIAGNOSIS — Z12.31 VISIT FOR SCREENING MAMMOGRAM: ICD-10-CM

## 2024-06-10 DIAGNOSIS — E78.5 HYPERLIPIDEMIA LDL GOAL <100: ICD-10-CM

## 2024-06-10 DIAGNOSIS — G47.00 PERSISTENT INSOMNIA: ICD-10-CM

## 2024-06-10 DIAGNOSIS — E03.9 ACQUIRED HYPOTHYROIDISM: ICD-10-CM

## 2024-06-10 DIAGNOSIS — Z12.11 SCREEN FOR COLON CANCER: ICD-10-CM

## 2024-06-10 DIAGNOSIS — Z00.01 ENCOUNTER FOR PREVENTATIVE ADULT HEALTH CARE EXAM WITH ABNORMAL FINDINGS: ICD-10-CM

## 2024-06-10 LAB
ANION GAP SERPL CALCULATED.3IONS-SCNC: 8 MMOL/L (ref 7–15)
BUN SERPL-MCNC: 10 MG/DL (ref 8–23)
CALCIUM SERPL-MCNC: 9.1 MG/DL (ref 8.8–10.2)
CHLORIDE SERPL-SCNC: 105 MMOL/L (ref 98–107)
CHOLEST SERPL-MCNC: 258 MG/DL
CREAT SERPL-MCNC: 0.81 MG/DL (ref 0.51–0.95)
DEPRECATED HCO3 PLAS-SCNC: 27 MMOL/L (ref 22–29)
EGFRCR SERPLBLD CKD-EPI 2021: 80 ML/MIN/1.73M2
FASTING STATUS PATIENT QL REPORTED: YES
FASTING STATUS PATIENT QL REPORTED: YES
GLUCOSE SERPL-MCNC: 95 MG/DL (ref 70–99)
HBA1C MFR BLD: 6.1 %
HDLC SERPL-MCNC: 60 MG/DL
LDLC SERPL CALC-MCNC: 172 MG/DL
NONHDLC SERPL-MCNC: 198 MG/DL
POTASSIUM SERPL-SCNC: 4 MMOL/L (ref 3.4–5.3)
SODIUM SERPL-SCNC: 140 MMOL/L (ref 135–145)
TRIGL SERPL-MCNC: 128 MG/DL
TSH SERPL DL<=0.005 MIU/L-ACNC: 2 UIU/ML (ref 0.3–4.2)

## 2024-06-10 PROCEDURE — 99000 SPECIMEN HANDLING OFFICE-LAB: CPT | Performed by: PATHOLOGY

## 2024-06-10 PROCEDURE — 80061 LIPID PANEL: CPT | Performed by: PATHOLOGY

## 2024-06-10 PROCEDURE — 36415 COLL VENOUS BLD VENIPUNCTURE: CPT | Performed by: PATHOLOGY

## 2024-06-10 PROCEDURE — 84443 ASSAY THYROID STIM HORMONE: CPT | Performed by: PATHOLOGY

## 2024-06-10 PROCEDURE — 83036 HEMOGLOBIN GLYCOSYLATED A1C: CPT | Performed by: FAMILY MEDICINE

## 2024-06-10 PROCEDURE — 80048 BASIC METABOLIC PNL TOTAL CA: CPT | Performed by: PATHOLOGY

## 2024-06-10 PROCEDURE — 99397 PER PM REEVAL EST PAT 65+ YR: CPT | Performed by: FAMILY MEDICINE

## 2024-06-10 RX ORDER — LEVOTHYROXINE SODIUM 100 UG/1
100 TABLET ORAL DAILY
Qty: 90 TABLET | Refills: 3 | Status: SHIPPED | OUTPATIENT
Start: 2024-06-10

## 2024-06-10 RX ORDER — ZOLPIDEM TARTRATE 5 MG/1
TABLET ORAL
Qty: 30 TABLET | Refills: 1 | Status: SHIPPED | OUTPATIENT
Start: 2024-06-10 | End: 2024-08-20

## 2024-06-10 SDOH — HEALTH STABILITY: PHYSICAL HEALTH: ON AVERAGE, HOW MANY DAYS PER WEEK DO YOU ENGAGE IN MODERATE TO STRENUOUS EXERCISE (LIKE A BRISK WALK)?: 4 DAYS

## 2024-06-10 SDOH — HEALTH STABILITY: PHYSICAL HEALTH: ON AVERAGE, HOW MANY MINUTES DO YOU ENGAGE IN EXERCISE AT THIS LEVEL?: 40 MIN

## 2024-06-10 ASSESSMENT — SOCIAL DETERMINANTS OF HEALTH (SDOH): HOW OFTEN DO YOU GET TOGETHER WITH FRIENDS OR RELATIVES?: MORE THAN THREE TIMES A WEEK

## 2024-06-10 NOTE — PROGRESS NOTES
Preventive Care Visit  [facility]  Teto Mesa MD, Family Medicine  David 10, 2024  {Provider  Link to SmartSet :868903}    {PROVIDER CHARTING PREFERENCE:352578}    Stanton Figueroa is a 64 year old, presenting for the following:  Physical (Muscle aches)        6/10/2024     1:34 PM   Additional Questions   Roomed by SK EMT     Health Care Directive  Patient does not have a Health Care Directive or Living Will: {ADVANCE_DIRECTIVE_STATUS:403193}    HPI  ***  {MA/LPN/RN Pre-Provider Visit Orders- hCG/UA/Strep (Optional):281634}  {SUPERLIST (Optional):115914}  {additonal problems for provider to add (Optional):666131}      6/10/2024   General Health   How would you rate your overall physical health? Good   Feel stress (tense, anxious, or unable to sleep) Rather much   (!) STRESS CONCERN      6/10/2024   Nutrition   Diet: Regular (no restrictions)         6/10/2024   Exercise   Days per week of moderate/strenous exercise 4 days   Average minutes spent exercising at this level 40 min         6/10/2024   Social Factors   Frequency of gathering with friends or relatives More than three times a week   Worry food won't last until get money to buy more No   Food not last or not have enough money for food? No   Do you have housing?  Yes   Are you worried about losing your housing? No   Lack of transportation? No   Unable to get utilities (heat,electricity)? No         6/10/2024   Fall Risk   Fallen 2 or more times in the past year? Yes    Yes   Trouble with walking or balance? No    No   Reason Gait Speed Test Not Completed Patient declines          6/10/2024   Activities of Daily Living- Home Safety   Needs help with the following daily activites None of the above   Safety concerns in the home None of the above         6/10/2024   Dental   Dentist two times every year? Yes         6/10/2024   Hearing Screening   Hearing concerns? None of the above         6/10/2024    Driving Risk Screening   Patient/family members have concerns about driving (!) YES ***         6/10/2024   General Alertness/Fatigue Screening   Have you been more tired than usual lately? (!) YES         6/10/2024   Urinary Incontinence Screening   Bothered by leaking urine in past 6 months No            Today's PHQ-2 Score:       6/10/2024     1:37 PM   PHQ-2 ( 1999 Pf[employer])   Q1: Little interest or pleasure in doing things 1   Q2: Feeling down, depressed or hopeless 1   PHQ-2 Score 2   Q1: Little interest or pleasure in doing things Several days   Q2: Feeling down, depressed or hopeless Several days   PHQ-2 Score 2           6/10/2024   Substance Use   Alcohol more than 3/day or more than 7/wk No   Do you have a current opioid prescription? No   How severe/bad is pain from 1 to 10? 3/10   Do you use any other substances recreationally? No     Social History     Tobacco Use    Smoking status: Never    Smokeless tobacco: Never    Tobacco comments:     exposure to second hand smoke   Vaping Use    Vaping status: Never Used   Substance Use Topics    Alcohol use: No    Drug use: No     {Provider  If there are gaps in the social history shown above, please follow the link to update and then refresh the note Link to Social and Substance History :445430}        6/10/2024   Breast Cancer Screening   Family history of breast, colon, or ovarian cancer? No / Unknown         6/27/2023   LAST FHS-7 RESULTS   1st degree relative breast or ovarian cancer No   Any relative bilateral breast cancer No   Any male have breast cancer No   Any ONE woman have BOTH breast AND ovarian cancer No   Any woman with breast cancer before 50yrs No   2 or more relatives with breast AND/OR ovarian cancer No   2 or more relatives with breast AND/OR bowel cancer No     {If any of the questions to the FHS7 are answered yes, consider referral for genetic counseling.    Additional indications for genetic referral include personal history of breast  or ovarian cancer, genetic mutation in 1st degree relative which increases risk of breast cancer including BRCA1, BRCA2, AQUILINO, PALB 2, TP53, CHEK2, PTEN, CDH1, STK11 (per ACS) and/or 1st degree relative with history of pancreatic or high-risk prostate cancer (per NCCN):756978}   {Mammogram Decision Support (Optional):934047}      History of abnormal Pap smear: { :897527}        Latest Ref Rng & Units 6/12/2023     3:49 PM 1/24/2018     4:00 PM 1/24/2018     3:28 PM   PAP / HPV   PAP  Negative for Intraepithelial Lesion or Malignancy (NILM)      PAP (Historical)    NIL    HPV 16 DNA Negative Negative  Negative     HPV 18 DNA Negative Negative  Negative     Other HR HPV Negative Negative  Negative       ASCVD Risk   The 10-year ASCVD risk score (Gayle JEROME, et al., 2019) is: 11.1%    Values used to calculate the score:      Age: 64 years      Sex: Female      Is Non- : Yes      Diabetic: No      Tobacco smoker: No      Systolic Blood Pressure: 136 mmHg      Is BP treated: No      HDL Cholesterol: 58 mg/dL      Total Cholesterol: 252 mg/dL    {Link to Fracture Risk Assessment Tool (Optional):144055}    {Provider  Use the storyboard to review patient history, after sections have been marked as reviewed, refresh note to capture documentation:186303}  {Provider   REQUIRED AWV use this link to review and update sexual activity history  after section has been marked as reviewed, refresh note to capture documentation:392656}  Reviewed and updated as needed this visit by Provider                    {HISTORY OPTIONS (Optional):077078}  Current providers sharing in care for this patient include:  Patient Care Team:  Teto Mesa MD as PCP - General (Family Medicine)  Jewell Bowers AuD as Audiologist (Audiology)  Teto Mesa MD as Assigned PCP  Carmen Fishman MD as MD (Ophthalmology)  Carmen Fishman MD as Assigned Surgical Provider    The following [facility]  "items are reviewed in Epic and correct as of today:  Health Maintenance   Topic Date Due    DEXA  Never done    RSV VACCINE (Pregnancy & 60+) (1 - 1-dose 60+ series) Never done    COVID-19 Vaccine (7 - 2023-24 season) 02/02/2024    ANNUAL REVIEW OF HM ORDERS  02/13/2024    Pneumococcal Vaccine: 65+ Years (1 of 1 - PCV) 01/01/2024    MEDICARE ANNUAL WELLNESS VISIT  06/12/2024    LIPID  06/12/2024    TSH W/FREE T4 REFLEX  06/12/2024    MAMMO SCREENING  06/27/2024    COLORECTAL CANCER SCREENING  07/07/2024    FALL RISK ASSESSMENT  06/10/2025    GLUCOSE  06/12/2026    ADVANCE CARE PLANNING  04/18/2027    DTAP/TDAP/TD IMMUNIZATION (3 - Td or Tdap) 09/25/2027    HEPATITIS C SCREENING  Completed    HIV SCREENING  Completed    PHQ-2 (once per calendar year)  Completed    INFLUENZA VACCINE  Completed    ZOSTER IMMUNIZATION  Completed    HPV IMMUNIZATION  Aged Out    MENINGITIS IMMUNIZATION  Aged Out    RSV MONOCLONAL ANTIBODY  Aged Out    PAP  Discontinued    IPV IMMUNIZATION  Discontinued       {ROS Picklists (Optional):775822}     Objective    Exam  /85 (BP Location: Right arm, Patient Position: Sitting, Cuff Size: Adult Regular)   Pulse 68   Temp 97.9  F (36.6  C) (Oral)   Ht 1.67 m (5' 5.75\")   Wt 78.2 kg (172 lb 6.4 oz)   LMP 01/13/2012 (Approximate)   SpO2 97%   BMI 28.04 kg/m     Estimated body mass index is 28.04 kg/m  as calculated from the following:    Height as of this encounter: 1.67 m (5' 5.75\").    Weight as of this encounter: 78.2 kg (172 lb 6.4 oz).    Physical Exam  {Exam Choices (Optional):334793}  Signed Electronically by: Teto Mesa MD  {Email feedback regarding this note to [email or web address]   :072319}  "

## 2024-06-11 ENCOUNTER — TELEPHONE (OUTPATIENT)
Dept: FAMILY MEDICINE | Facility: CLINIC | Age: 64
End: 2024-06-11
Payer: COMMERCIAL

## 2024-06-12 NOTE — RESULT ENCOUNTER NOTE
Your cholesterol has increased risk score is 11.7 % goal is less than 7.5%. At this time we would consider a medication to lower your cholesterol such as atorvastatin  20 mg.  If you are interested in starting this medication please send me a message or call with pharmacy information.   Also your three month measure of glucose control A1c 6.1 slightly increased was in the pre-diabetes range.    Lower portion size, lower carbohydrate choices ( less bread, pasta, rice, potatoes, sugar containing foods and eliminate sugar containing beverages.) Also important is daily physical activity such as walking or stationary bike 30 minutes daily.  Thyroid in range on current dose of Levothyroxine 100 mcg continue this dose.  Other results in range.  Teto Mesa MD

## 2024-06-26 ENCOUNTER — TELEPHONE (OUTPATIENT)
Dept: FAMILY MEDICINE | Facility: CLINIC | Age: 64
End: 2024-06-26
Payer: COMMERCIAL

## 2024-06-26 ENCOUNTER — TELEPHONE (OUTPATIENT)
Dept: OPHTHALMOLOGY | Facility: CLINIC | Age: 64
End: 2024-06-26
Payer: COMMERCIAL

## 2024-06-26 NOTE — TELEPHONE ENCOUNTER
Health Call Center    Phone Message    May a detailed message be left on voicemail: yes     Reason for Call: Symptoms or Concerns     If patient has red-flag symptoms, warm transfer to triage line    Current symptom or concern: Patient called to schedule her yearly exam and to discuss the ongoing eye pain she has. Writer told patient she needs to wait until 08/2024 to schedule for eye exam for insurance purposes but patient is requesting to be seen sooner by Dr. Gomez for her ongoing eye pain. Per protocols, writer unable to verify if provider will see patient for this. Please call patient to schedule.     Symptoms have been present for:  6 month(s)    Has patient previously been seen for this? Yes    By: Dr. Fishman    Date: 01/30/2024    Are there any new or worsening symptoms? No    Action Taken: Message routed to:  [facility] Eye    Travel Screening: Not Applicable

## 2024-06-27 NOTE — TELEPHONE ENCOUNTER
Left message on mobile number/home care number    Reviewed tentatively scheduled next available with Dr. Gomez at 10:40 AM with arrival time of 10:25 AM on August 15th for ongoing symptoms for 6 months.    Reviewed may call main scheduling number to review rescheduling options and provided direct number for any other concerns.    Nathan Leonard RN 9:00 AM 06/27/24

## 2024-07-05 PROCEDURE — 99000 SPECIMEN HANDLING OFFICE-LAB: CPT | Performed by: PATHOLOGY

## 2024-07-05 PROCEDURE — 82274 ASSAY TEST FOR BLOOD FECAL: CPT | Performed by: FAMILY MEDICINE

## 2024-07-08 LAB — HEMOCCULT STL QL IA: NEGATIVE

## 2024-07-08 NOTE — RESULT ENCOUNTER NOTE
Additional results available:    Your test for fecal (stool) occult blood colon cancer screen was negative, good result.  Best wishes,  Teto Mesa MD

## 2024-08-15 ENCOUNTER — OFFICE VISIT (OUTPATIENT)
Dept: OPHTHALMOLOGY | Facility: CLINIC | Age: 64
End: 2024-08-15
Payer: COMMERCIAL

## 2024-08-15 ENCOUNTER — ANCILLARY PROCEDURE (OUTPATIENT)
Dept: MAMMOGRAPHY | Facility: CLINIC | Age: 64
End: 2024-08-15
Attending: FAMILY MEDICINE
Payer: COMMERCIAL

## 2024-08-15 DIAGNOSIS — H52.03 HYPERMETROPIA OF BOTH EYES: ICD-10-CM

## 2024-08-15 DIAGNOSIS — H25.012 CORTICAL SENILE CATARACT, LEFT: ICD-10-CM

## 2024-08-15 DIAGNOSIS — Z12.31 VISIT FOR SCREENING MAMMOGRAM: ICD-10-CM

## 2024-08-15 DIAGNOSIS — H43.812 POSTERIOR VITREOUS DETACHMENT, LEFT EYE: ICD-10-CM

## 2024-08-15 DIAGNOSIS — H43.811 POSTERIOR VITREOUS DETACHMENT, RIGHT EYE: ICD-10-CM

## 2024-08-15 DIAGNOSIS — H02.88B MEIBOMIAN GLAND DYSFUNCTION (MGD) OF UPPER AND LOWER LIDS OF BOTH EYES: Primary | ICD-10-CM

## 2024-08-15 DIAGNOSIS — Z00.01 ENCOUNTER FOR PREVENTATIVE ADULT HEALTH CARE EXAM WITH ABNORMAL FINDINGS: ICD-10-CM

## 2024-08-15 DIAGNOSIS — H02.88A MEIBOMIAN GLAND DYSFUNCTION (MGD) OF UPPER AND LOWER LIDS OF BOTH EYES: Primary | ICD-10-CM

## 2024-08-15 PROCEDURE — 77067 SCR MAMMO BI INCL CAD: CPT | Mod: GC | Performed by: STUDENT IN AN ORGANIZED HEALTH CARE EDUCATION/TRAINING PROGRAM

## 2024-08-15 PROCEDURE — 92004 COMPRE OPH EXAM NEW PT 1/>: CPT | Performed by: OPTOMETRIST

## 2024-08-15 PROCEDURE — 77063 BREAST TOMOSYNTHESIS BI: CPT | Mod: GC | Performed by: STUDENT IN AN ORGANIZED HEALTH CARE EDUCATION/TRAINING PROGRAM

## 2024-08-15 PROCEDURE — 92134 CPTRZ OPH DX IMG PST SGM RTA: CPT | Performed by: OPTOMETRIST

## 2024-08-15 ASSESSMENT — VISUAL ACUITY
OD_CC: J2
OS_CC: J2
OS_CC: 20/30
OS_CC+: -2
METHOD: SNELLEN - LINEAR
OD_CC: 20/30
OD_CC+: -2

## 2024-08-15 ASSESSMENT — CONF VISUAL FIELD
OD_INFERIOR_NASAL_RESTRICTION: 0
METHOD: COUNTING FINGERS
OD_NORMAL: 1
OD_SUPERIOR_TEMPORAL_RESTRICTION: 0
OS_INFERIOR_NASAL_RESTRICTION: 0
OS_SUPERIOR_TEMPORAL_RESTRICTION: 0
OS_NORMAL: 1
OS_INFERIOR_TEMPORAL_RESTRICTION: 0
OD_SUPERIOR_NASAL_RESTRICTION: 0
OS_SUPERIOR_NASAL_RESTRICTION: 0
OD_INFERIOR_TEMPORAL_RESTRICTION: 0

## 2024-08-15 ASSESSMENT — REFRACTION_WEARINGRX
OD_SPHERE: +1.00
OS_ADD: +2.50
OD_ADD: +2.50
OS_CYLINDER: +0.25
SPECS_TYPE: PAL
OS_AXIS: 140
OS_SPHERE: +1.50
OD_AXIS: 004
OD_CYLINDER: +0.50

## 2024-08-15 ASSESSMENT — CUP TO DISC RATIO
OD_RATIO: 0.3
OS_RATIO: 0.3

## 2024-08-15 ASSESSMENT — TONOMETRY
OD_IOP_MMHG: 18
OS_IOP_MMHG: 16
IOP_METHOD: ICARE

## 2024-08-15 ASSESSMENT — EXTERNAL EXAM - LEFT EYE: OS_EXAM: NORMAL

## 2024-08-15 ASSESSMENT — REFRACTION_MANIFEST
OD_AXIS: 004
OD_SPHERE: +0.75
OD_ADD: +2.50
OS_CYLINDER: +1.25
OS_AXIS: 140
OD_CYLINDER: +0.50
OS_ADD: +2.50
OS_SPHERE: +1.00

## 2024-08-15 ASSESSMENT — EXTERNAL EXAM - RIGHT EYE: OD_EXAM: NORMAL

## 2024-08-15 ASSESSMENT — SLIT LAMP EXAM - LIDS
COMMENTS: 1+ MGD
COMMENTS: 1+ MGD

## 2024-08-15 NOTE — PROGRESS NOTES
History  HPI       COMPREHENSIVE EYE EXAM    Treatments tried include eye drops.  Pain was noted as 6/10. Additional comments: Patient present requesting Comprehensive exam             Comments    States eye pain is not her concern today but does have some left eye discomfort burning on and off. Intermittently red OU. No pattern noticed. Denies any photophobia but some watering at times left eye >right eye.  It not bothering much presently.   Vision is OK.   Got glasses from exam in Jan. Of this year. But feel like vision has changed distance vision is not clear and would like to update glasses. Feels insurance is fine for another exam.     EES ointment PRN each eye   Zaditor BID each eye sometimes used TID   Lubricants PRN each eye.   Compress a few times weekly each eye.     AGATHA Hartley COT 11:16 AM August 15, 2024                         Last edited by Bobbi Richey COT on 8/15/2024 11:17 AM.          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 Continue use of drops and ointment as needed. Recommended warm compresses with Aaron mask or equivalent 1-2 times daily for ten minutes. Monitor at follow-up in 1 month.    (H52.03) Hypermetropia of both eyes  Comment: Hyperopic astigmatism both eyes with presbyopia, wears glasses as needed  Plan:  Spectacle prescription withheld given decreased BCVA right eye. Recheck at follow-up in 1 month as this may be transient.    (H43.811) Posterior vitreous detachment, right eye  (H43.812) Posterior vitreous detachment, left eye  Comment: Longstanding floaters  Plan: OCT Retina Spectralis OU (both eyes)         Educated patient on signs and symptoms of retinal detachment including increase in flashes, floaters, or a change in vision. If symptoms present, return to  clinic immediately.   Explained that PVD right eye would appear to be recent given shallow nature on OCT and foveal distortion. Monitor with repeat OCT and repeat refraction at follow-up in 1 month.    (H25.012) Cortical senile cataract, left  Comment: Not visually significant  Plan:  No treatment indicated at this time. Monitor annually.    Return to clinic in 1 month for follow-up.    Complete documentation of historical and exam elements from today's encounter can  be found in the full encounter summary report (not reduplicated in this progress  note). I personally obtained the chief complaint(s) and history of present illness. I  confirmed and edited as necessary the review of systems, past medical/surgical  history, family history, social history, and examination findings as documented by  others; and I examined the patient myself. I personally reviewed the relevant tests,  images, and reports as documented above. I formulated and edited as necessary the  assessment and plan and discussed the findings and management plan with the  patient and family.    Michael Gomez OD, FAAO

## 2024-08-15 NOTE — NURSING NOTE
Chief Complaints and History of Present Illnesses   Patient presents with    COMPREHENSIVE EYE EXAM     Patient present requesting Comprehensive exam     Chief Complaint(s) and History of Present Illness(es)       COMPREHENSIVE EYE EXAM              Treatments tried: eye drops    Pain scale: 6/10    Comments: Patient present requesting Comprehensive exam              Comments    States eye pain is not her concern today but does have some left eye discomfort burning on and off. Intermittently red OU. No pattern noticed. Denies any photophobia but some watering at times left eye >right eye.  It not bothering much presently.   Vision is OK.   Got glasses from exam in Jan. Of this year. But feel like vision has changed distance vision is not clear and would like to update glasses. Feels insurance is fine for another exam.     EES ointment PRN each eye   Zaditor BID each eye sometimes used TID   Lubricants PRN each eye.   Compress a few times weekly each eye.     AGATHA Hartley COT 11:16 AM August 15, 2024

## 2024-08-20 ENCOUNTER — MYC REFILL (OUTPATIENT)
Dept: FAMILY MEDICINE | Facility: CLINIC | Age: 64
End: 2024-08-20
Payer: COMMERCIAL

## 2024-08-20 DIAGNOSIS — G47.00 PERSISTENT INSOMNIA: ICD-10-CM

## 2024-08-20 RX ORDER — ZOLPIDEM TARTRATE 5 MG/1
TABLET ORAL
Qty: 30 TABLET | Refills: 1 | Status: SHIPPED | OUTPATIENT
Start: 2024-08-20 | End: 2024-08-21

## 2024-08-20 NOTE — TELEPHONE ENCOUNTER
zolpidem (AMBIEN) 5 MG tablet 30 tablet 1 2024 -- No   Sig: Use at bedtime if needed to assist with sleep use sparingly   Sent to pharmacy as: Zolpidem Tartrate 5 MG Oral Tablet (AMBIEN)   Class: E-Prescribe   Order: [id number]   E-Prescribing Status: Receipt confirmed by pharmacy (2024  4:30 PM CDT)     PDMP Review         Value Time User    State PDMP site checked  Yes 2024  4:29 PM Teto Mesa MD Jamie D. Santilli, MD   2024 a adjusted RX for specific dose.  zolpidem (AMBIEN) 5 MG tablet 30 tablet 1 2024 -- No   Si mg at bedtime if needed, use sparingly   Sent to pharmacy as: Zolpidem Tartrate 5 MG Oral Tablet (AMBIEN)   Class: E-Prescribe   Order: [id number]   E-Prescribing Status: Receipt confirmed by pharmacy (2024  6:26 AM CDT)   Teto Mesa MD

## 2024-08-21 RX ORDER — ZOLPIDEM TARTRATE 5 MG/1
TABLET ORAL
Qty: 30 TABLET | Refills: 1 | Status: SHIPPED | OUTPATIENT
Start: 2024-08-21

## 2024-09-26 ENCOUNTER — OFFICE VISIT (OUTPATIENT)
Dept: OPHTHALMOLOGY | Facility: CLINIC | Age: 64
End: 2024-09-26
Payer: COMMERCIAL

## 2024-09-26 DIAGNOSIS — H52.03 HYPERMETROPIA OF BOTH EYES: ICD-10-CM

## 2024-09-26 DIAGNOSIS — H43.812 POSTERIOR VITREOUS DETACHMENT, LEFT EYE: ICD-10-CM

## 2024-09-26 DIAGNOSIS — H02.88A MEIBOMIAN GLAND DYSFUNCTION (MGD) OF UPPER AND LOWER LIDS OF BOTH EYES: Primary | ICD-10-CM

## 2024-09-26 DIAGNOSIS — H43.811 POSTERIOR VITREOUS DETACHMENT, RIGHT EYE: ICD-10-CM

## 2024-09-26 DIAGNOSIS — H02.88B MEIBOMIAN GLAND DYSFUNCTION (MGD) OF UPPER AND LOWER LIDS OF BOTH EYES: Primary | ICD-10-CM

## 2024-09-26 DIAGNOSIS — H25.012 CORTICAL SENILE CATARACT, LEFT: ICD-10-CM

## 2024-09-26 PROCEDURE — 92015 DETERMINE REFRACTIVE STATE: CPT | Performed by: OPTOMETRIST

## 2024-09-26 PROCEDURE — 99213 OFFICE O/P EST LOW 20 MIN: CPT | Performed by: OPTOMETRIST

## 2024-09-26 ASSESSMENT — REFRACTION_MANIFEST
OS_CYLINDER: +1.00
OD_SPHERE: +1.25
OS_SPHERE: +1.00
OS_ADD: +2.50
OD_ADD: +2.50
OD_CYLINDER: SPHERE
OS_AXIS: 146

## 2024-09-26 ASSESSMENT — REFRACTION_WEARINGRX
OS_AXIS: 140
OS_CYLINDER: +0.25
OD_SPHERE: +1.00
OD_ADD: +2.50
SPECS_TYPE: PAL
OD_AXIS: 004
OS_ADD: +2.50
OD_CYLINDER: +0.50
OS_SPHERE: +1.50

## 2024-09-26 ASSESSMENT — EXTERNAL EXAM - RIGHT EYE: OD_EXAM: NORMAL

## 2024-09-26 ASSESSMENT — REFRACTION
OD_CYLINDER: SPHERE
OD_SPHERE: +1.00

## 2024-09-26 ASSESSMENT — CUP TO DISC RATIO
OS_RATIO: 0.3
OD_RATIO: 0.3

## 2024-09-26 ASSESSMENT — VISUAL ACUITY
OD_CC+: +1
METHOD: SNELLEN - LINEAR
OD_CC: 20/40
CORRECTION_TYPE: GLASSES
OS_CC+: -3
OD_PH_CC: 20/30
OS_CC: 20/25

## 2024-09-26 ASSESSMENT — TONOMETRY
OD_IOP_MMHG: 15
IOP_METHOD: ICARE
OS_IOP_MMHG: 17

## 2024-09-26 ASSESSMENT — EXTERNAL EXAM - LEFT EYE: OS_EXAM: NORMAL

## 2024-09-26 NOTE — NURSING NOTE
"Chief Complaints and History of Present Illnesses   Patient presents with    Follow Up     6 week follow up Meibomian gland dysfunction (MGD) of upper and lower lids of both eyes     Chief Complaint(s) and History of Present Illness(es)       Follow Up              Associated symptoms: burning.  Negative for eye pain    Comments: 6 week follow up Meibomian gland dysfunction (MGD) of upper and lower lids of both eyes              Comments    Pt states no VA changes since last visit.  Constant \"heaviness and burning sensation\" left eye.   Denies tearing or photophobia.    Ocular Meds:  Erythromycin aguila PRN  Zaditor 3-4 times daily  Warm compress once daily    Maksim Ho 1:24 PM September 26, 2024                    "

## 2024-09-26 NOTE — PROGRESS NOTES
"History  HPI       Follow Up    Associated symptoms include burning.  Negative for eye pain. Additional comments: 6 week follow up Meibomian gland dysfunction (MGD) of upper and lower lids of both eyes             Comments    Pt states no VA changes since last visit.  Constant \"heaviness and burning sensation\" left eye.   Denies tearing or photophobia.    Ocular Meds:  Erythromycin aguila PRN  Zaditor 3-4 times daily  Warm compress once daily    Maksim Henao 1:24 PM September 26, 2024            Last edited by Maksim Henao on 9/26/2024  1:24 PM.        Assessment/Plan  (H02.88A,  H02.88B) Meibomian gland dysfunction (MGD) of upper and lower lids of both eyes   (primary encounter diagnosis)  Comment: Symptomatic with burning sensation left eye, reports stable symptoms, currently performing warm compresses for 20 seconds daily  Plan:   Educated patient on condition and clinical findings. Continue use of drops and ointment as needed. Recommended to increase warm compresses to ten minutes a day 1-2 times daily. Monitor as needed.    (H43.812) Posterior vitreous detachment, left eye  (H43.811) Posterior vitreous detachment, right eye  Comment: Longstanding floaters. Stable OCT right eye today, no change in opacity on posterior hyaloid face  Plan:  Educated patient on signs and symptoms of retinal detachment including increase in flashes, floaters, or a change in vision. If symptoms present, return to clinic immediately.     (H52.03) Hypermetropia of both eyes  Comment: Hyperopic astigmatism both eyes with presbyopia   Plan: REFRACTION  Dispensed spectacle prescription for full time wear. Monitor annually.    (H25.012) Cortical senile cataract, left  Comment: Not visually significant  Plan: No treatment indicated at this time. Monitor annually.    Return to clinic in 1 year for comprehensive exam.    Konrad Julio, Optometry Student    I have reviewed and agree with the above plan as written.    Teaching Statement:    Complete " documentation of historical and exam elements from today's encounter can  be found in the full encounter summary report (not reduplicated in this progress  note). I personally obtained the chief complaint(s) and history of present illness. I  confirmed and edited as necessary the review of systems, past medical/surgical  history, family history, social history, and examination findings as documented by  others; and I examined the patient myself. I personally reviewed the relevant tests,  images, and reports as documented above. I formulated and edited as necessary the  assessment and plan and discussed the findings and management plan with the  patient and family.    Michael Gomez OD, FAAO

## 2024-10-23 ENCOUNTER — IMMUNIZATION (OUTPATIENT)
Dept: FAMILY MEDICINE | Facility: CLINIC | Age: 64
End: 2024-10-23
Payer: COMMERCIAL

## 2024-10-23 DIAGNOSIS — Z23 ENCOUNTER FOR IMMUNIZATION: Primary | ICD-10-CM

## 2024-10-23 PROCEDURE — 90471 IMMUNIZATION ADMIN: CPT

## 2024-10-23 PROCEDURE — 99207 PR NO CHARGE NURSE ONLY: CPT

## 2024-10-23 PROCEDURE — 91320 SARSCV2 VAC 30MCG TRS-SUC IM: CPT

## 2024-10-23 PROCEDURE — 90662 IIV NO PRSV INCREASED AG IM: CPT

## 2024-10-23 PROCEDURE — 90480 ADMN SARSCOV2 VAC 1/ONLY CMP: CPT

## 2024-10-23 NOTE — PROGRESS NOTES
Prior to immunization administration, verified patients identity using patient s name and date of birth. Please see Immunization Activity for additional information.     Is the patient's temperature normal (100.5 or less)? Yes     Patient MEETS CRITERIA. PROCEED with vaccine administration.      Patient instructed to remain in clinic for 15 minutes afterwards, and to report any adverse reactions.      Link to Ancillary Visit Immunization Standing Orders SmartSet     Screening performed by Wagner Reina MA on 10/23/2024 at 1:21 PM.

## 2025-01-28 ENCOUNTER — TELEPHONE (OUTPATIENT)
Dept: INTERNAL MEDICINE | Facility: CLINIC | Age: 65
End: 2025-01-28
Payer: COMMERCIAL

## 2025-01-28 NOTE — TELEPHONE ENCOUNTER
Left Voicemail (1st Attempt) and Sent Mychart (1st Attempt) for the patient to call back and schedule the following:    Appointment type: [facility] New  Provider: Any  Return date: Approx. 6/10/2025  Specialty phone number: [phone]    Additional Notes: Patient needs annual physical but needs to reestablish care, as Dr. Mesa has retired

## 2025-01-30 ENCOUNTER — OFFICE VISIT (OUTPATIENT)
Dept: FAMILY MEDICINE | Facility: CLINIC | Age: 65
End: 2025-01-30
Payer: COMMERCIAL

## 2025-01-30 ENCOUNTER — TELEPHONE (OUTPATIENT)
Dept: FAMILY MEDICINE | Facility: CLINIC | Age: 65
End: 2025-01-30

## 2025-01-30 VITALS
BODY MASS INDEX: 27.09 KG/M2 | HEIGHT: 67 IN | OXYGEN SATURATION: 100 % | TEMPERATURE: 97.5 F | HEART RATE: 63 BPM | WEIGHT: 172.6 LBS | SYSTOLIC BLOOD PRESSURE: 132 MMHG | DIASTOLIC BLOOD PRESSURE: 82 MMHG | RESPIRATION RATE: 15 BRPM

## 2025-01-30 DIAGNOSIS — M54.16 LUMBAR RADICULOPATHY: Primary | ICD-10-CM

## 2025-01-30 DIAGNOSIS — M62.830 BACK MUSCLE SPASM: ICD-10-CM

## 2025-01-30 DIAGNOSIS — R10.9 LEFT FLANK PAIN: ICD-10-CM

## 2025-01-30 LAB
ALBUMIN UR-MCNC: NEGATIVE MG/DL
APPEARANCE UR: CLEAR
BILIRUB UR QL STRIP: NEGATIVE
COLOR UR AUTO: YELLOW
GLUCOSE UR STRIP-MCNC: NEGATIVE MG/DL
HGB UR QL STRIP: NEGATIVE
KETONES UR STRIP-MCNC: NEGATIVE MG/DL
LEUKOCYTE ESTERASE UR QL STRIP: NEGATIVE
NITRATE UR QL: NEGATIVE
PH UR STRIP: 7.5 [PH] (ref 5–7)
SP GR UR STRIP: 1.01 (ref 1–1.03)
UROBILINOGEN UR STRIP-ACNC: 0.2 E.U./DL

## 2025-01-30 RX ORDER — TIZANIDINE 2 MG/1
2 TABLET ORAL
Qty: 60 TABLET | Refills: 0 | Status: SHIPPED | OUTPATIENT
Start: 2025-01-30

## 2025-01-30 ASSESSMENT — PAIN SCALES - GENERAL: PAINLEVEL_OUTOF10: MILD PAIN (3)

## 2025-01-30 NOTE — PATIENT INSTRUCTIONS
At [facility], we strive to deliver an exceptional experience to you, every time we see you. If you receive a survey, please let us know what we are doing well and/or what we could improve upon, as we do value your feedback.  If you have MyChart, you can expect to receive results automatically within 24 hours of their completion.  Your provider will send a note interpreting your results as well.   If you do not have MyChart, you should receive your results in about a week by mail.    Your care team:                            Family Medicine Internal Medicine   MD Rito Pepper, MD Wilma Williamson, MD Boyd Crow, MD Natalie Soria, PALizzetteC    Joseph Henao, MD Pediatrics   Valeria Ralph, MD Suri Pearson, MD Taya Cruz, APRN CNP Becky Grace APRN CNP   MD Inez Little, MD Priyanka Schmitz, CNP     Daljit Edmond, CNP Same-Day Provider (No follow-up visits)   TRUNG Akers, DNP Carey Fu, TRUNG Rocha, FNP, BC JENA VasquezC     Clinic hours: Monday - Thursday 7 am-6 pm; Fridays 7 am-5 pm.   Urgent care: Monday - Friday 10 am- 8 pm; Saturday and Sunday 9 am-5 pm.    Clinic: [phone]       [facility]: Monday - Thursday 8 am - 7 pm; Friday 8 am - 6 pm  [facility]: [phone]

## 2025-01-30 NOTE — TELEPHONE ENCOUNTER
Not a good time for patient, MyC sent to schedule:    [facility] Re-Establish New with any [facility] Internal or Family Medicine provider after 6/10/25.

## 2025-01-30 NOTE — PROGRESS NOTES
"  Assessment & Plan     Lumbar radiculopathy  Patient complains of back pain that started 2 months ago and progressively worse. No recent trauma. No saddle anesthesia.   Physical activity as tolerated, encouraged pt to stay active.   Lifestyle modification: good lifting techniques.  - REVIEW OF HEALTH MAINTENANCE PROTOCOL ORDERS  - XR Lumbar Spine 2/3 Views; Future  - diclofenac (VOLTAREN) 50 MG EC tablet; Take 1 tablet (50 mg) by mouth 2 times daily as needed for moderate pain (Take with food).  - Physical Therapy  Referral; Future    Back muscle spasm  - tiZANidine (ZANAFLEX) 2 MG tablet; Take 1 tablet (2 mg) by mouth nightly as needed for muscle spasms.    Left flank pain  Presents with flank pain, no other symptoms, no  fever and chills.  - UA Macroscopic with reflex to Microscopic and Culture - Lab Collect    BMI  Estimated body mass index is 27.35 kg/m  as calculated from the following:    Height as of this encounter: 1.692 m (5' 6.61\").    Weight as of this encounter: 78.3 kg (172 lb 9.6 oz).             Stanton Figueroa is a 65 year old, presenting for the following health issues:  Musculoskeletal Problem (Back pain, left side extends to leg. Feels itchy also.)      1/30/2025    12:50 PM   Additional Questions   Roomed by Sneha   Accompanied by self         1/30/2025    12:50 PM   Patient Reported Additional Medications   Patient reports taking the following new medications no     Musculoskeletal Problem    History of Present Illness       Reason for visit:  Back pain  Symptom onset:  More than a month  Symptom intensity:  Moderate  Symptom progression:  Staying the same  Had these symptoms before:  No  What makes it worse:  Sting  What makes it better:  Not   She is taking medications regularly.         Pt has back pain that extends to side        Review of Systems  Constitutional, HEENT, cardiovascular, pulmonary, gi and gu systems are negative, except as otherwise noted.      Objective    BP " "(!) 155/93 (BP Location: Left arm, Patient Position: Sitting, Cuff Size: Adult Large)   Pulse 63   Temp 97.5  F (36.4  C) (Temporal)   Resp 15   Ht 1.692 m (5' 6.61\")   Wt 78.3 kg (172 lb 9.6 oz)   LMP 01/13/2012 (Approximate)   SpO2 100%   BMI 27.35 kg/m    Body mass index is 27.35 kg/m .  Physical Exam   GENERAL: alert and no distress  RESP: lungs clear to auscultation - no rales, rhonchi or wheezes  CV: regular rate and rhythm, normal S1 S2, no S3 or S4, no murmur, click or rub, no peripheral edema  MS: no gross musculoskeletal defects noted, no edema  NEURO: Normal strength and tone, mentation intact and speech normal  BACK: no CVA tenderness, no paralumbar tenderness            Signed Electronically by: TRUNG Akers CNP    "

## 2025-05-12 ENCOUNTER — PATIENT OUTREACH (OUTPATIENT)
Dept: CARE COORDINATION | Facility: CLINIC | Age: 65
End: 2025-05-12
Payer: COMMERCIAL

## 2025-06-11 ENCOUNTER — PATIENT OUTREACH (OUTPATIENT)
Dept: CARE COORDINATION | Facility: CLINIC | Age: 65
End: 2025-06-11
Payer: COMMERCIAL

## 2025-06-12 DIAGNOSIS — Z12.11 COLON CANCER SCREENING: ICD-10-CM

## 2025-06-26 ENCOUNTER — ORDERS ONLY (AUTO-RELEASED) (OUTPATIENT)
Dept: URGENT CARE | Facility: CLINIC | Age: 65
End: 2025-06-26
Payer: COMMERCIAL

## 2025-06-26 DIAGNOSIS — Z12.11 COLON CANCER SCREENING: ICD-10-CM

## 2025-06-30 ENCOUNTER — TELEPHONE (OUTPATIENT)
Dept: FAMILY MEDICINE | Facility: CLINIC | Age: 65
End: 2025-06-30
Payer: COMMERCIAL

## 2025-06-30 NOTE — TELEPHONE ENCOUNTER
Patient Quality Outreach    Patient is due for the following:   Colon Cancer Screening  Physical Annual Wellness Visit      Topic Date Due    Pneumococcal Vaccine (1 of 1 - PCV) Never done    COVID-19 Vaccine (8 - 2024-25 season) 04/23/2025       Action(s) Taken:   Schedule a Annual Wellness Visit    Type of outreach:    Sent Wally message.    Questions for provider review:    None         Miguel A Gallegos  Chart routed to None.

## 2025-07-08 LAB — HEMOCCULT STL QL IA: NEGATIVE

## 2025-07-21 ENCOUNTER — PATIENT OUTREACH (OUTPATIENT)
Dept: CARE COORDINATION | Facility: CLINIC | Age: 65
End: 2025-07-21
Payer: COMMERCIAL

## 2025-07-26 ENCOUNTER — HEALTH MAINTENANCE LETTER (OUTPATIENT)
Age: 65
End: 2025-07-26

## 2025-08-04 DIAGNOSIS — E03.9 ACQUIRED HYPOTHYROIDISM: ICD-10-CM

## 2025-08-05 RX ORDER — LEVOTHYROXINE SODIUM 100 UG/1
100 TABLET ORAL DAILY
Qty: 30 TABLET | Refills: 0 | Status: SHIPPED | OUTPATIENT
Start: 2025-08-05

## 2025-08-07 DIAGNOSIS — E03.9 ACQUIRED HYPOTHYROIDISM: ICD-10-CM

## 2025-08-07 RX ORDER — LEVOTHYROXINE SODIUM 100 UG/1
100 TABLET ORAL DAILY
Qty: 30 TABLET | Refills: 0 | OUTPATIENT
Start: 2025-08-07

## 2025-08-18 ENCOUNTER — ANCILLARY PROCEDURE (OUTPATIENT)
Dept: MAMMOGRAPHY | Facility: CLINIC | Age: 65
End: 2025-08-18
Attending: FAMILY MEDICINE
Payer: COMMERCIAL

## 2025-08-18 DIAGNOSIS — Z12.31 VISIT FOR SCREENING MAMMOGRAM: ICD-10-CM

## 2025-08-18 PROCEDURE — 77063 BREAST TOMOSYNTHESIS BI: CPT | Mod: GC

## 2025-08-18 PROCEDURE — 77067 SCR MAMMO BI INCL CAD: CPT | Mod: GC

## 2025-08-21 ENCOUNTER — OFFICE VISIT (OUTPATIENT)
Dept: FAMILY MEDICINE | Facility: CLINIC | Age: 65
End: 2025-08-21
Payer: COMMERCIAL

## 2025-08-21 VITALS
RESPIRATION RATE: 18 BRPM | HEIGHT: 67 IN | HEART RATE: 65 BPM | TEMPERATURE: 97.1 F | BODY MASS INDEX: 27 KG/M2 | DIASTOLIC BLOOD PRESSURE: 84 MMHG | WEIGHT: 172 LBS | OXYGEN SATURATION: 100 % | SYSTOLIC BLOOD PRESSURE: 139 MMHG

## 2025-08-21 DIAGNOSIS — Z00.00 ROUTINE GENERAL MEDICAL EXAMINATION AT A HEALTH CARE FACILITY: Primary | ICD-10-CM

## 2025-08-21 DIAGNOSIS — M54.42 CHRONIC BILATERAL LOW BACK PAIN WITH LEFT-SIDED SCIATICA: ICD-10-CM

## 2025-08-21 DIAGNOSIS — Z23 NEED FOR VACCINATION: ICD-10-CM

## 2025-08-21 DIAGNOSIS — E03.4 HYPOTHYROIDISM DUE TO ACQUIRED ATROPHY OF THYROID: ICD-10-CM

## 2025-08-21 DIAGNOSIS — R73.03 PRE-DIABETES: ICD-10-CM

## 2025-08-21 DIAGNOSIS — E78.5 HYPERLIPIDEMIA LDL GOAL <100: ICD-10-CM

## 2025-08-21 DIAGNOSIS — G89.29 CHRONIC BILATERAL LOW BACK PAIN WITH LEFT-SIDED SCIATICA: ICD-10-CM

## 2025-08-21 DIAGNOSIS — Z78.0 ASYMPTOMATIC POSTMENOPAUSAL STATUS: ICD-10-CM

## 2025-08-21 DIAGNOSIS — M25.562 LEFT MEDIAL KNEE PAIN: ICD-10-CM

## 2025-08-21 LAB
EST. AVERAGE GLUCOSE BLD GHB EST-MCNC: 111 MG/DL
HBA1C MFR BLD: 5.5 % (ref 0–5.6)

## 2025-08-21 RX ORDER — LEVOTHYROXINE SODIUM 100 UG/1
100 TABLET ORAL DAILY
Qty: 90 TABLET | Refills: 0 | Status: SHIPPED | OUTPATIENT
Start: 2025-08-21

## 2025-08-21 SDOH — HEALTH STABILITY: PHYSICAL HEALTH: ON AVERAGE, HOW MANY DAYS PER WEEK DO YOU ENGAGE IN MODERATE TO STRENUOUS EXERCISE (LIKE A BRISK WALK)?: 4 DAYS

## 2025-08-21 SDOH — HEALTH STABILITY: PHYSICAL HEALTH: ON AVERAGE, HOW MANY MINUTES DO YOU ENGAGE IN EXERCISE AT THIS LEVEL?: 30 MIN

## 2025-08-21 ASSESSMENT — PAIN SCALES - GENERAL: PAINLEVEL_OUTOF10: MODERATE PAIN (6)
